# Patient Record
Sex: MALE | Race: ASIAN | NOT HISPANIC OR LATINO
[De-identification: names, ages, dates, MRNs, and addresses within clinical notes are randomized per-mention and may not be internally consistent; named-entity substitution may affect disease eponyms.]

---

## 2017-01-26 ENCOUNTER — APPOINTMENT (OUTPATIENT)
Dept: UROLOGY | Facility: CLINIC | Age: 67
End: 2017-01-26

## 2017-01-27 LAB
APPEARANCE: CLEAR
BACTERIA: NEGATIVE
BILIRUBIN URINE: NEGATIVE
BLOOD URINE: NEGATIVE
COLOR: YELLOW
GLUCOSE QUALITATIVE U: NORMAL MG/DL
KETONES URINE: NEGATIVE
LEUKOCYTE ESTERASE URINE: NEGATIVE
MICROSCOPIC-UA: NORMAL
NITRITE URINE: NEGATIVE
PH URINE: 6
PROTEIN URINE: NEGATIVE MG/DL
RED BLOOD CELLS URINE: 0 /HPF
SPECIFIC GRAVITY URINE: 1.01
SQUAMOUS EPITHELIAL CELLS: 0 /HPF
UROBILINOGEN URINE: NORMAL MG/DL
WHITE BLOOD CELLS URINE: 1 /HPF

## 2017-02-27 ENCOUNTER — APPOINTMENT (OUTPATIENT)
Dept: CV DIAGNOSTICS | Facility: HOSPITAL | Age: 67
End: 2017-02-27

## 2017-02-27 ENCOUNTER — OUTPATIENT (OUTPATIENT)
Dept: OUTPATIENT SERVICES | Facility: HOSPITAL | Age: 67
LOS: 1 days | End: 2017-02-27
Payer: MEDICAID

## 2017-02-27 DIAGNOSIS — R07.89 OTHER CHEST PAIN: ICD-10-CM

## 2017-02-27 PROCEDURE — 93016 CV STRESS TEST SUPVJ ONLY: CPT | Mod: GC

## 2017-02-27 PROCEDURE — 78452 HT MUSCLE IMAGE SPECT MULT: CPT | Mod: 26

## 2017-02-27 PROCEDURE — 93018 CV STRESS TEST I&R ONLY: CPT | Mod: GC

## 2017-05-11 ENCOUNTER — APPOINTMENT (OUTPATIENT)
Dept: UROLOGY | Facility: CLINIC | Age: 67
End: 2017-05-11

## 2017-05-11 RX ORDER — AMOXICILLIN AND CLAVULANATE POTASSIUM 875; 125 MG/1; MG/1
875-125 TABLET, COATED ORAL TWICE DAILY
Qty: 20 | Refills: 0 | Status: COMPLETED | COMMUNITY
Start: 2017-05-11 | End: 2017-05-18

## 2017-05-17 ENCOUNTER — MESSAGE (OUTPATIENT)
Age: 67
End: 2017-05-17

## 2017-08-17 ENCOUNTER — OUTPATIENT (OUTPATIENT)
Dept: OUTPATIENT SERVICES | Facility: HOSPITAL | Age: 67
LOS: 1 days | End: 2017-08-17
Payer: MEDICAID

## 2017-08-17 ENCOUNTER — APPOINTMENT (OUTPATIENT)
Dept: CARDIOLOGY | Facility: HOSPITAL | Age: 67
End: 2017-08-17

## 2017-08-17 DIAGNOSIS — R07.9 CHEST PAIN, UNSPECIFIED: ICD-10-CM

## 2017-08-17 PROCEDURE — 75574 CT ANGIO HRT W/3D IMAGE: CPT

## 2017-08-17 PROCEDURE — 75574 CT ANGIO HRT W/3D IMAGE: CPT | Mod: 26

## 2017-11-06 ENCOUNTER — TRANSCRIPTION ENCOUNTER (OUTPATIENT)
Age: 67
End: 2017-11-06

## 2017-11-06 ENCOUNTER — INPATIENT (INPATIENT)
Facility: HOSPITAL | Age: 67
LOS: 0 days | Discharge: HOME | End: 2017-11-07
Attending: INTERNAL MEDICINE | Admitting: INTERNAL MEDICINE
Payer: MEDICAID

## 2017-11-06 VITALS
SYSTOLIC BLOOD PRESSURE: 156 MMHG | WEIGHT: 128.09 LBS | HEART RATE: 65 BPM | HEIGHT: 67 IN | DIASTOLIC BLOOD PRESSURE: 91 MMHG | TEMPERATURE: 98 F | RESPIRATION RATE: 16 BRPM | OXYGEN SATURATION: 100 %

## 2017-11-06 DIAGNOSIS — R94.39 ABNORMAL RESULT OF OTHER CARDIOVASCULAR FUNCTION STUDY: ICD-10-CM

## 2017-11-06 LAB
BUN SERPL-MCNC: 12 MG/DL — SIGNIFICANT CHANGE UP (ref 7–23)
CALCIUM SERPL-MCNC: 8.6 MG/DL — SIGNIFICANT CHANGE UP (ref 8.4–10.5)
CHLORIDE SERPL-SCNC: 100 MMOL/L — SIGNIFICANT CHANGE UP (ref 98–107)
CO2 SERPL-SCNC: 32 MMOL/L — HIGH (ref 22–31)
CREAT SERPL-MCNC: 1.02 MG/DL — SIGNIFICANT CHANGE UP (ref 0.5–1.3)
GLUCOSE SERPL-MCNC: 98 MG/DL — SIGNIFICANT CHANGE UP (ref 70–99)
HBA1C BLD-MCNC: 5.6 % — SIGNIFICANT CHANGE UP (ref 4–5.6)
HCT VFR BLD CALC: 44.8 % — SIGNIFICANT CHANGE UP (ref 39–50)
HGB BLD-MCNC: 15.1 G/DL — SIGNIFICANT CHANGE UP (ref 13–17)
MCHC RBC-ENTMCNC: 31.8 PG — SIGNIFICANT CHANGE UP (ref 27–34)
MCHC RBC-ENTMCNC: 33.7 % — SIGNIFICANT CHANGE UP (ref 32–36)
MCV RBC AUTO: 94.3 FL — SIGNIFICANT CHANGE UP (ref 80–100)
NRBC # FLD: 0 — SIGNIFICANT CHANGE UP
PLATELET # BLD AUTO: 163 K/UL — SIGNIFICANT CHANGE UP (ref 150–400)
PMV BLD: 10.8 FL — SIGNIFICANT CHANGE UP (ref 7–13)
POTASSIUM SERPL-MCNC: 3.8 MMOL/L — SIGNIFICANT CHANGE UP (ref 3.5–5.3)
POTASSIUM SERPL-SCNC: 3.8 MMOL/L — SIGNIFICANT CHANGE UP (ref 3.5–5.3)
RBC # BLD: 4.75 M/UL — SIGNIFICANT CHANGE UP (ref 4.2–5.8)
RBC # FLD: 12.6 % — SIGNIFICANT CHANGE UP (ref 10.3–14.5)
SODIUM SERPL-SCNC: 141 MMOL/L — SIGNIFICANT CHANGE UP (ref 135–145)
WBC # BLD: 3.85 K/UL — SIGNIFICANT CHANGE UP (ref 3.8–10.5)
WBC # FLD AUTO: 3.85 K/UL — SIGNIFICANT CHANGE UP (ref 3.8–10.5)

## 2017-11-06 PROCEDURE — 93010 ELECTROCARDIOGRAM REPORT: CPT

## 2017-11-06 RX ORDER — METOPROLOL TARTRATE 50 MG
50 TABLET ORAL DAILY
Qty: 0 | Refills: 0 | Status: DISCONTINUED | OUTPATIENT
Start: 2017-11-06 | End: 2017-11-07

## 2017-11-06 RX ORDER — ASPIRIN/CALCIUM CARB/MAGNESIUM 324 MG
81 TABLET ORAL DAILY
Qty: 0 | Refills: 0 | Status: CANCELLED | OUTPATIENT
Start: 2017-11-07 | End: 2017-11-07

## 2017-11-06 RX ORDER — HEPARIN SODIUM 5000 [USP'U]/ML
5000 INJECTION INTRAVENOUS; SUBCUTANEOUS EVERY 12 HOURS
Qty: 0 | Refills: 0 | Status: CANCELLED | OUTPATIENT
Start: 2017-11-07 | End: 2017-11-07

## 2017-11-06 RX ORDER — SODIUM CHLORIDE 9 MG/ML
3 INJECTION INTRAMUSCULAR; INTRAVENOUS; SUBCUTANEOUS EVERY 8 HOURS
Qty: 0 | Refills: 0 | Status: DISCONTINUED | OUTPATIENT
Start: 2017-11-06 | End: 2017-11-07

## 2017-11-06 RX ORDER — ATORVASTATIN CALCIUM 80 MG/1
80 TABLET, FILM COATED ORAL AT BEDTIME
Qty: 0 | Refills: 0 | Status: DISCONTINUED | OUTPATIENT
Start: 2017-11-06 | End: 2017-11-07

## 2017-11-06 RX ORDER — CLOPIDOGREL BISULFATE 75 MG/1
75 TABLET, FILM COATED ORAL DAILY
Qty: 0 | Refills: 0 | Status: CANCELLED | OUTPATIENT
Start: 2017-11-07 | End: 2017-11-07

## 2017-11-06 RX ORDER — SODIUM CHLORIDE 9 MG/ML
500 INJECTION INTRAMUSCULAR; INTRAVENOUS; SUBCUTANEOUS
Qty: 0 | Refills: 0 | Status: DISCONTINUED | OUTPATIENT
Start: 2017-11-06 | End: 2017-11-06

## 2017-11-06 RX ORDER — TAMSULOSIN HYDROCHLORIDE 0.4 MG/1
0.4 CAPSULE ORAL AT BEDTIME
Qty: 0 | Refills: 0 | Status: DISCONTINUED | OUTPATIENT
Start: 2017-11-06 | End: 2017-11-07

## 2017-11-06 RX ORDER — LISINOPRIL 2.5 MG/1
40 TABLET ORAL DAILY
Qty: 0 | Refills: 0 | Status: DISCONTINUED | OUTPATIENT
Start: 2017-11-06 | End: 2017-11-07

## 2017-11-06 RX ORDER — FINASTERIDE 5 MG/1
5 TABLET, FILM COATED ORAL DAILY
Qty: 0 | Refills: 0 | Status: DISCONTINUED | OUTPATIENT
Start: 2017-11-06 | End: 2017-11-07

## 2017-11-06 RX ADMIN — TAMSULOSIN HYDROCHLORIDE 0.4 MILLIGRAM(S): 0.4 CAPSULE ORAL at 21:21

## 2017-11-06 RX ADMIN — Medication 50 MILLIGRAM(S): at 14:30

## 2017-11-06 RX ADMIN — SODIUM CHLORIDE 3 MILLILITER(S): 9 INJECTION INTRAMUSCULAR; INTRAVENOUS; SUBCUTANEOUS at 13:41

## 2017-11-06 RX ADMIN — SODIUM CHLORIDE 3 MILLILITER(S): 9 INJECTION INTRAMUSCULAR; INTRAVENOUS; SUBCUTANEOUS at 21:21

## 2017-11-06 RX ADMIN — LISINOPRIL 40 MILLIGRAM(S): 2.5 TABLET ORAL at 14:30

## 2017-11-06 RX ADMIN — SODIUM CHLORIDE 75 MILLILITER(S): 9 INJECTION INTRAMUSCULAR; INTRAVENOUS; SUBCUTANEOUS at 13:41

## 2017-11-06 RX ADMIN — ATORVASTATIN CALCIUM 80 MILLIGRAM(S): 80 TABLET, FILM COATED ORAL at 21:21

## 2017-11-06 NOTE — H&P CARDIOLOGY - HISTORY OF PRESENT ILLNESS
67 y.o. male presents today for elective cardiac catheterization. The patient The patient c/o increased fatigue within 2 years. Admits to chest pain and SOB wit exertion (running and lifting weights). Admits to episodes of dizziness, palpitations,  b/l lower extremities edema. Denies fever, chill, melena, hematochezia, abdominal pain. The patient was evaluated by his cardiologist last years, was found to have an abnormal stress test, recommended to have cardiac cath but declined at that time. Due to continuation of symptoms, the patient was reevaluated by his cardiologist again recommended cardiac cath patient agreed this time. 67 y.o. male presents today for elective cardiac catheterization. The patient The patient c/o increased fatigue within 2 years. Admits to chest pain and SOB with exertion (running and lifting weights). Admits to episodes of dizziness, palpitations,  b/l lower extremities edema. Denies fever, chill, presyncope, syncope, melena, hematochezia, abdominal pain. The patient was evaluated by his cardiologist in 2/2017s, was found to have an abnormal stress test, echo adn Ct coronary angiogram, recommended to have cardiac cath but the patient declined cath  at that time. Due to continuation of symptoms, the patient was reevaluated by his cardiologist again recommended cardiac cath, patient agreed this time. The patient admits to noncompliance to Rx.     Echo 2/7/17 - minimal AI, mild to moderate MR, EF 60%  Stress Test 2/2/7/17 - EF 55%,medium sized, moderate defects in apical, inferoseptal, septal walls that are reversible, suggestive of ischemia.   CT coronary angiogram  8/17/17 - LM 50%, LAD 60-70%, LCx patent, RCA 30%.

## 2017-11-06 NOTE — H&P CARDIOLOGY - PMH
BPH (benign prostatic hyperplasia)    Cholelithiasis  pending surgery  Essential hypertension    Hypercholesterolemia

## 2017-11-06 NOTE — H&P CARDIOLOGY - REVIEW OF SYSTEMS
NO  syncope,  fever chills, malaise, myalgias, anorexia, weight changes ( loss or gain), night sweats, generalized fatigue abdominal pain, N/V/C/D BRBPR, melena,  cough, and wheezing.

## 2017-11-07 VITALS
SYSTOLIC BLOOD PRESSURE: 126 MMHG | TEMPERATURE: 98 F | RESPIRATION RATE: 16 BRPM | OXYGEN SATURATION: 100 % | DIASTOLIC BLOOD PRESSURE: 76 MMHG | HEART RATE: 68 BPM | WEIGHT: 127.87 LBS

## 2017-11-07 LAB
BUN SERPL-MCNC: 13 MG/DL — SIGNIFICANT CHANGE UP (ref 7–23)
CALCIUM SERPL-MCNC: 8.6 MG/DL — SIGNIFICANT CHANGE UP (ref 8.4–10.5)
CHLORIDE SERPL-SCNC: 101 MMOL/L — SIGNIFICANT CHANGE UP (ref 98–107)
CO2 SERPL-SCNC: 26 MMOL/L — SIGNIFICANT CHANGE UP (ref 22–31)
CREAT SERPL-MCNC: 0.96 MG/DL — SIGNIFICANT CHANGE UP (ref 0.5–1.3)
GLUCOSE SERPL-MCNC: 101 MG/DL — HIGH (ref 70–99)
HCT VFR BLD CALC: 47.3 % — SIGNIFICANT CHANGE UP (ref 39–50)
HGB BLD-MCNC: 15.7 G/DL — SIGNIFICANT CHANGE UP (ref 13–17)
MAGNESIUM SERPL-MCNC: 2.3 MG/DL — SIGNIFICANT CHANGE UP (ref 1.6–2.6)
MCHC RBC-ENTMCNC: 31.8 PG — SIGNIFICANT CHANGE UP (ref 27–34)
MCHC RBC-ENTMCNC: 33.2 % — SIGNIFICANT CHANGE UP (ref 32–36)
MCV RBC AUTO: 95.9 FL — SIGNIFICANT CHANGE UP (ref 80–100)
NRBC # FLD: 0 — SIGNIFICANT CHANGE UP
PHOSPHATE SERPL-MCNC: 3.5 MG/DL — SIGNIFICANT CHANGE UP (ref 2.5–4.5)
PLATELET # BLD AUTO: 160 K/UL — SIGNIFICANT CHANGE UP (ref 150–400)
PMV BLD: 10.9 FL — SIGNIFICANT CHANGE UP (ref 7–13)
POTASSIUM SERPL-MCNC: 3.7 MMOL/L — SIGNIFICANT CHANGE UP (ref 3.5–5.3)
POTASSIUM SERPL-SCNC: 3.7 MMOL/L — SIGNIFICANT CHANGE UP (ref 3.5–5.3)
RBC # BLD: 4.93 M/UL — SIGNIFICANT CHANGE UP (ref 4.2–5.8)
RBC # FLD: 12.7 % — SIGNIFICANT CHANGE UP (ref 10.3–14.5)
SODIUM SERPL-SCNC: 139 MMOL/L — SIGNIFICANT CHANGE UP (ref 135–145)
WBC # BLD: 6.29 K/UL — SIGNIFICANT CHANGE UP (ref 3.8–10.5)
WBC # FLD AUTO: 6.29 K/UL — SIGNIFICANT CHANGE UP (ref 3.8–10.5)

## 2017-11-07 RX ORDER — METOPROLOL TARTRATE 50 MG
1 TABLET ORAL
Qty: 0 | Refills: 0 | COMMUNITY
Start: 2017-11-07

## 2017-11-07 RX ORDER — CLOPIDOGREL BISULFATE 75 MG/1
1 TABLET, FILM COATED ORAL
Qty: 30 | Refills: 0 | OUTPATIENT
Start: 2017-11-07 | End: 2017-12-07

## 2017-11-07 RX ORDER — ASPIRIN/CALCIUM CARB/MAGNESIUM 324 MG
1 TABLET ORAL
Qty: 100 | Refills: 0 | OUTPATIENT
Start: 2017-11-07 | End: 2018-02-15

## 2017-11-07 RX ORDER — CLOPIDOGREL BISULFATE 75 MG/1
1 TABLET, FILM COATED ORAL
Qty: 0 | Refills: 0 | COMMUNITY
Start: 2017-11-07

## 2017-11-07 RX ORDER — ASPIRIN/CALCIUM CARB/MAGNESIUM 324 MG
1 TABLET ORAL
Qty: 0 | Refills: 0 | COMMUNITY
Start: 2017-11-07

## 2017-11-07 RX ORDER — ATORVASTATIN CALCIUM 80 MG/1
1 TABLET, FILM COATED ORAL
Qty: 30 | Refills: 0 | OUTPATIENT
Start: 2017-11-07 | End: 2017-12-07

## 2017-11-07 RX ORDER — METOPROLOL TARTRATE 50 MG
1 TABLET ORAL
Qty: 30 | Refills: 0 | OUTPATIENT
Start: 2017-11-07 | End: 2017-12-07

## 2017-11-07 RX ORDER — ATORVASTATIN CALCIUM 80 MG/1
1 TABLET, FILM COATED ORAL
Qty: 0 | Refills: 0 | COMMUNITY
Start: 2017-11-07

## 2017-11-07 RX ADMIN — CLOPIDOGREL BISULFATE 75 MILLIGRAM(S): 75 TABLET, FILM COATED ORAL at 11:17

## 2017-11-07 RX ADMIN — FINASTERIDE 5 MILLIGRAM(S): 5 TABLET, FILM COATED ORAL at 11:17

## 2017-11-07 RX ADMIN — Medication 81 MILLIGRAM(S): at 11:17

## 2017-11-07 RX ADMIN — SODIUM CHLORIDE 3 MILLILITER(S): 9 INJECTION INTRAMUSCULAR; INTRAVENOUS; SUBCUTANEOUS at 05:51

## 2017-11-07 RX ADMIN — LISINOPRIL 40 MILLIGRAM(S): 2.5 TABLET ORAL at 05:51

## 2017-11-07 RX ADMIN — Medication 50 MILLIGRAM(S): at 05:51

## 2017-11-07 NOTE — DISCHARGE NOTE ADULT - PATIENT PORTAL LINK FT
“You can access the [facility] Patient Portal, offered by [facility], by registering with the following website: [email or web address]”

## 2017-11-07 NOTE — PROGRESS NOTE ADULT - ASSESSMENT
67 year old male for elective cardiac catheterization.     1. cv stable   no chest pain or sob  s/p cath mLAD 90% x1 BMS, LCx luminal, RCA normal, EF 60%,   continue with ASA/ plavix/ statin/ BB     2. htn   bp acceptable  continue with current antihtn meds     dc planning today   f.u appointment for 11/21/17 at 0915 am

## 2017-11-07 NOTE — DISCHARGE NOTE ADULT - PLAN OF CARE
To prevent chest pain, heart attack and worsening coronary artery disease Continue aspirin and Plavix, do not stop unless instructed by physician.  Follow up with cardiologist  Continue low cholesterol diet To maintain a normal blood pressure to prevent heart attack, stroke and renal failure Low sodium diet, Follow up with primary care physician.  Continue anti-hypertensive medications Maintain normal cholesterol levels to prevent stroke and heart attack Low fat diet, Continue current medications. Follow up with primary care physician and cardiologist To be asymptomatic Continue current medications as prescribed. Low fat diet.  Would defer any elective cholecystectomy for at least 3 months.

## 2017-11-07 NOTE — DISCHARGE NOTE ADULT - CARE PROVIDER_API CALL
Sami Colon (MD), Cardiovascular Disease; Internal Medicine; Interventional Cardiology; Nuclear Cardiology  [address] [facility] [address]  [place] [address]  Phone: [phone]  Fax: [phone]

## 2017-11-07 NOTE — PROGRESS NOTE ADULT - SUBJECTIVE AND OBJECTIVE BOX
CARDIOLOGY FOLLOW UP - Dr. Darlene MORRIS      PHYSICAL EXAM:  T(C): 36.6 (11-07-17 @ 05:55), Max: 36.7 (11-06-17 @ 17:21)  HR: 68 (11-07-17 @ 05:55) (60 - 68)  BP: 126/76 (11-07-17 @ 05:55) (126/76 - 156/91)  RR: 16 (11-07-17 @ 05:55) (16 - 16)  SpO2: 100% (11-07-17 @ 05:55) (100% - 100%)  Wt(kg): --  I&O's Summary    06 Nov 2017 07:01  -  07 Nov 2017 07:00  --------------------------------------------------------  IN: 400 mL / OUT: 0 mL / NET: 400 mL        Appearance: Normal	  Cardiovascular: Normal S1 S2,RRR, No JVD, No murmurs  Respiratory: Lungs clear to auscultation	  Gastrointestinal:  Soft, Non-tender, + BS	  Extremities: Normal range of motion, No clubbing, cyanosis or edema        MEDICATIONS  (STANDING):  atorvastatin 80 milliGRAM(s) Oral at bedtime  finasteride 5 milliGRAM(s) Oral daily  lisinopril 40 milliGRAM(s) Oral daily  metoprolol succinate ER 50 milliGRAM(s) Oral daily  sodium chloride 0.9% lock flush 3 milliLiter(s) IV Push every 8 hours  tamsulosin 0.4 milliGRAM(s) Oral at bedtime      TELEMETRY: NSR 	    ECG:  	  RADIOLOGY:   DIAGNOSTIC TESTING:  [ ] Echocardiogram:  [ x ]  Catheterization:  < from: Cardiac Cath Lab - Adult (11.06.17 @ 10:46) >  INTERVENTIONAL IMPRESSIONS: Unstable angina. Patient with persistent and  progressive exertional angina. Recent exercise stress testing and CTA  coronaries revealed severe ischemia in LAD distribution and obstructive  LAD disease. Cath with severe focal mid LAD lesion with normal LCX and  large RCA vessels. Mild segmental LV dysfunction with hypokinesis of the  apex and distal inferior wall with overall preserved EF.  INTERVENTIONAL RECOMMENDATIONS: PCI to severe mid LAD lesion in setting of  unstable angina, severe anterior, apical wall ischemia. Bare metal stent  used in setting ofpatient's known history of medication noncompliance.  Continue ASA 81mg and plavix daily. Would defer any elective  cholecystectomy for at least 3 months.  Prepared and signed by  Sami Colon M.D.  Signed 11/06/2017 12:33:22    < end of copied text >    [ ] Stress Test:    OTHER: 	    LABS:	 	                                15.7   6.29  )-----------( 160      ( 07 Nov 2017 06:40 )             47.3     11-07    139  |  101  |  13  ----------------------------<  101<H>  3.7   |  26  |  0.96    Ca    8.6      07 Nov 2017 06:40  Phos  3.5     11-07  Mg     2.3     11-07

## 2017-11-07 NOTE — DISCHARGE NOTE ADULT - HOSPITAL COURSE
67 y.o. male presents today for elective cardiac catheterization.  Cath report:  PCI to severe mid LAD lesion in setting of unstable angina, severe anterior, apical wall ischemia. Bare metal stent used in setting of patient's known history of medication noncompliance.  Continue ASA 81mg and plavix daily. Would defer any elective cholecystectomy for at least 3 months.

## 2017-11-07 NOTE — DISCHARGE NOTE ADULT - ADDITIONAL INSTRUCTIONS
Follow up with your PMD and cardiologist within one week.  No heavy lifting greater than 5-10 pounds with right wrist x one week. No strenuous activity x 3 weeks. Monitor site of procedure and notify your doctor for any redness, swelling, discharge

## 2017-11-07 NOTE — DISCHARGE NOTE ADULT - MEDICATION SUMMARY - MEDICATIONS TO TAKE
I will START or STAY ON the medications listed below when I get home from the hospital:    finasteride 5 mg oral tablet  -- 1 tab(s) by mouth once a day  -- Indication: For BPH (benign prostatic hyperplasia)    aspirin 81 mg oral delayed release tablet  -- 1 tab(s) by mouth once a day  -- Indication: For CAD     ramipril 10 mg oral capsule  -- 1 cap(s) by mouth 2 times a day  -- Indication: For Essential hypertension    Flomax 0.4 mg oral capsule  -- 1 cap(s) by mouth once a day  -- Indication: For BPH (benign prostatic hyperplasia)    atorvastatin 80 mg oral tablet  -- 1 tab(s) by mouth once a day (at bedtime)  -- Indication: For Hypercholesterolemia    clopidogrel 75 mg oral tablet  -- 1 tab(s) by mouth once a day  -- Indication: For CAD    metoprolol succinate 50 mg oral tablet, extended release  -- 1 tab(s) by mouth once a day  -- Indication: For Essential hypertension

## 2017-11-07 NOTE — DISCHARGE NOTE ADULT - NS AS ACTIVITY OBS
No Heavy lifting/straining/No heavy lifting greater than 5-10 pounds with right wrist x one week. No strenuous activity x 3 weeks. Monitor site of procedure and notify your doctor for any redness, swelling, discharge

## 2017-11-07 NOTE — DISCHARGE NOTE ADULT - CARE PLAN
Principal Discharge DX:	CAD S/P percutaneous coronary angioplasty  Goal:	To prevent chest pain, heart attack and worsening coronary artery disease  Instructions for follow-up, activity and diet:	Continue aspirin and Plavix, do not stop unless instructed by physician.  Follow up with cardiologist  Continue low cholesterol diet  Secondary Diagnosis:	Essential hypertension  Goal:	To maintain a normal blood pressure to prevent heart attack, stroke and renal failure  Instructions for follow-up, activity and diet:	Low sodium diet, Follow up with primary care physician.  Continue anti-hypertensive medications  Secondary Diagnosis:	Hypercholesterolemia  Goal:	Maintain normal cholesterol levels to prevent stroke and heart attack  Instructions for follow-up, activity and diet:	Low fat diet, Continue current medications. Follow up with primary care physician and cardiologist  Secondary Diagnosis:	BPH (benign prostatic hyperplasia)  Goal:	To be asymptomatic  Instructions for follow-up, activity and diet:	Continue current medications as prescribed.  Secondary Diagnosis:	Cholelithiasis  Goal:	To be asymptomatic  Instructions for follow-up, activity and diet:	Low fat diet.  Would defer any elective cholecystectomy for at least 3 months.

## 2017-11-14 ENCOUNTER — MEDICATION RENEWAL (OUTPATIENT)
Age: 67
End: 2017-11-14

## 2017-12-12 ENCOUNTER — APPOINTMENT (OUTPATIENT)
Dept: UROLOGY | Facility: CLINIC | Age: 67
End: 2017-12-12
Payer: MEDICAID

## 2017-12-12 PROCEDURE — 99213 OFFICE O/P EST LOW 20 MIN: CPT

## 2017-12-12 RX ORDER — ASPIRIN 81 MG
81 TABLET, DELAYED RELEASE (ENTERIC COATED) ORAL
Refills: 0 | Status: ACTIVE | COMMUNITY

## 2017-12-12 RX ORDER — AMOXICILLIN AND CLAVULANATE POTASSIUM 875; 125 MG/1; MG/1
875-125 TABLET, COATED ORAL TWICE DAILY
Qty: 14 | Refills: 0 | Status: COMPLETED | COMMUNITY
Start: 2017-12-12 | End: 2017-12-19

## 2017-12-28 ENCOUNTER — MEDICATION RENEWAL (OUTPATIENT)
Age: 67
End: 2017-12-28

## 2018-06-22 PROBLEM — N40.0 BENIGN PROSTATIC HYPERPLASIA WITHOUT LOWER URINARY TRACT SYMPTOMS: Chronic | Status: ACTIVE | Noted: 2017-11-06

## 2018-07-12 ENCOUNTER — APPOINTMENT (OUTPATIENT)
Dept: UROLOGY | Facility: CLINIC | Age: 68
End: 2018-07-12
Payer: MEDICARE

## 2018-07-12 VITALS
DIASTOLIC BLOOD PRESSURE: 74 MMHG | HEART RATE: 75 BPM | SYSTOLIC BLOOD PRESSURE: 178 MMHG | RESPIRATION RATE: 16 BRPM | TEMPERATURE: 98.7 F

## 2018-07-12 PROCEDURE — 99213 OFFICE O/P EST LOW 20 MIN: CPT

## 2018-12-01 ENCOUNTER — OUTPATIENT (OUTPATIENT)
Dept: OUTPATIENT SERVICES | Facility: HOSPITAL | Age: 68
LOS: 1 days | End: 2018-12-01
Payer: MEDICARE

## 2018-12-01 PROCEDURE — G9001: CPT

## 2018-12-17 ENCOUNTER — INPATIENT (INPATIENT)
Facility: HOSPITAL | Age: 68
LOS: 2 days | Discharge: HOME | End: 2018-12-20
Attending: INTERNAL MEDICINE | Admitting: INTERNAL MEDICINE
Payer: MEDICARE

## 2018-12-17 VITALS
RESPIRATION RATE: 18 BRPM | HEART RATE: 90 BPM | TEMPERATURE: 98 F | DIASTOLIC BLOOD PRESSURE: 69 MMHG | OXYGEN SATURATION: 95 % | SYSTOLIC BLOOD PRESSURE: 170 MMHG

## 2018-12-17 DIAGNOSIS — K80.50 CALCULUS OF BILE DUCT WITHOUT CHOLANGITIS OR CHOLECYSTITIS WITHOUT OBSTRUCTION: ICD-10-CM

## 2018-12-17 DIAGNOSIS — R55 SYNCOPE AND COLLAPSE: ICD-10-CM

## 2018-12-17 DIAGNOSIS — R51 HEADACHE: ICD-10-CM

## 2018-12-17 DIAGNOSIS — I10 ESSENTIAL (PRIMARY) HYPERTENSION: ICD-10-CM

## 2018-12-17 DIAGNOSIS — Z29.9 ENCOUNTER FOR PROPHYLACTIC MEASURES, UNSPECIFIED: ICD-10-CM

## 2018-12-17 DIAGNOSIS — N40.0 BENIGN PROSTATIC HYPERPLASIA WITHOUT LOWER URINARY TRACT SYMPTOMS: ICD-10-CM

## 2018-12-17 DIAGNOSIS — I20.0 UNSTABLE ANGINA: ICD-10-CM

## 2018-12-17 DIAGNOSIS — E78.00 PURE HYPERCHOLESTEROLEMIA, UNSPECIFIED: ICD-10-CM

## 2018-12-17 LAB
ALBUMIN SERPL ELPH-MCNC: 4.4 G/DL — SIGNIFICANT CHANGE UP (ref 3.3–5)
ALP SERPL-CCNC: 57 U/L — SIGNIFICANT CHANGE UP (ref 40–120)
ALT FLD-CCNC: 22 U/L — SIGNIFICANT CHANGE UP (ref 4–41)
APTT BLD: 32.2 SEC — SIGNIFICANT CHANGE UP (ref 27.5–36.3)
AST SERPL-CCNC: 26 U/L — SIGNIFICANT CHANGE UP (ref 4–40)
BASOPHILS # BLD AUTO: 0.05 K/UL — SIGNIFICANT CHANGE UP (ref 0–0.2)
BASOPHILS NFR BLD AUTO: 0.9 % — SIGNIFICANT CHANGE UP (ref 0–2)
BILIRUB SERPL-MCNC: 1 MG/DL — SIGNIFICANT CHANGE UP (ref 0.2–1.2)
BUN SERPL-MCNC: 16 MG/DL — SIGNIFICANT CHANGE UP (ref 7–23)
CALCIUM SERPL-MCNC: 9.1 MG/DL — SIGNIFICANT CHANGE UP (ref 8.4–10.5)
CHLORIDE SERPL-SCNC: 99 MMOL/L — SIGNIFICANT CHANGE UP (ref 98–107)
CK MB BLD-MCNC: 2.37 NG/ML — SIGNIFICANT CHANGE UP (ref 1–6.6)
CK MB BLD-MCNC: SIGNIFICANT CHANGE UP (ref 0–2.5)
CK SERPL-CCNC: 99 U/L — SIGNIFICANT CHANGE UP (ref 30–200)
CO2 SERPL-SCNC: 26 MMOL/L — SIGNIFICANT CHANGE UP (ref 22–31)
CREAT SERPL-MCNC: 0.96 MG/DL — SIGNIFICANT CHANGE UP (ref 0.5–1.3)
EOSINOPHIL # BLD AUTO: 0.11 K/UL — SIGNIFICANT CHANGE UP (ref 0–0.5)
EOSINOPHIL NFR BLD AUTO: 2 % — SIGNIFICANT CHANGE UP (ref 0–6)
GLUCOSE SERPL-MCNC: 108 MG/DL — HIGH (ref 70–99)
HCT VFR BLD CALC: 48.8 % — SIGNIFICANT CHANGE UP (ref 39–50)
HGB BLD-MCNC: 16.3 G/DL — SIGNIFICANT CHANGE UP (ref 13–17)
IMM GRANULOCYTES # BLD AUTO: 0.01 # — SIGNIFICANT CHANGE UP
IMM GRANULOCYTES NFR BLD AUTO: 0.2 % — SIGNIFICANT CHANGE UP (ref 0–1.5)
INR BLD: 1 — SIGNIFICANT CHANGE UP (ref 0.88–1.17)
LYMPHOCYTES # BLD AUTO: 0.99 K/UL — LOW (ref 1–3.3)
LYMPHOCYTES # BLD AUTO: 18.4 % — SIGNIFICANT CHANGE UP (ref 13–44)
MCHC RBC-ENTMCNC: 31.4 PG — SIGNIFICANT CHANGE UP (ref 27–34)
MCHC RBC-ENTMCNC: 33.4 % — SIGNIFICANT CHANGE UP (ref 32–36)
MCV RBC AUTO: 94 FL — SIGNIFICANT CHANGE UP (ref 80–100)
MONOCYTES # BLD AUTO: 0.42 K/UL — SIGNIFICANT CHANGE UP (ref 0–0.9)
MONOCYTES NFR BLD AUTO: 7.8 % — SIGNIFICANT CHANGE UP (ref 2–14)
NEUTROPHILS # BLD AUTO: 3.81 K/UL — SIGNIFICANT CHANGE UP (ref 1.8–7.4)
NEUTROPHILS NFR BLD AUTO: 70.7 % — SIGNIFICANT CHANGE UP (ref 43–77)
NRBC # FLD: 0 — SIGNIFICANT CHANGE UP
PLATELET # BLD AUTO: 180 K/UL — SIGNIFICANT CHANGE UP (ref 150–400)
PMV BLD: 10.3 FL — SIGNIFICANT CHANGE UP (ref 7–13)
POTASSIUM SERPL-MCNC: 3.6 MMOL/L — SIGNIFICANT CHANGE UP (ref 3.5–5.3)
POTASSIUM SERPL-SCNC: 3.6 MMOL/L — SIGNIFICANT CHANGE UP (ref 3.5–5.3)
PROT SERPL-MCNC: 7.4 G/DL — SIGNIFICANT CHANGE UP (ref 6–8.3)
PROTHROM AB SERPL-ACNC: 11.1 SEC — SIGNIFICANT CHANGE UP (ref 9.8–13.1)
RBC # BLD: 5.19 M/UL — SIGNIFICANT CHANGE UP (ref 4.2–5.8)
RBC # FLD: 12.7 % — SIGNIFICANT CHANGE UP (ref 10.3–14.5)
SODIUM SERPL-SCNC: 139 MMOL/L — SIGNIFICANT CHANGE UP (ref 135–145)
TROPONIN T, HIGH SENSITIVITY: 11 NG/L — SIGNIFICANT CHANGE UP (ref ?–14)
TROPONIN T, HIGH SENSITIVITY: 11 NG/L — SIGNIFICANT CHANGE UP (ref ?–14)
WBC # BLD: 5.39 K/UL — SIGNIFICANT CHANGE UP (ref 3.8–10.5)
WBC # FLD AUTO: 5.39 K/UL — SIGNIFICANT CHANGE UP (ref 3.8–10.5)

## 2018-12-17 PROCEDURE — 71046 X-RAY EXAM CHEST 2 VIEWS: CPT | Mod: 26

## 2018-12-17 PROCEDURE — 70450 CT HEAD/BRAIN W/O DYE: CPT | Mod: 26

## 2018-12-17 RX ORDER — ASPIRIN/CALCIUM CARB/MAGNESIUM 324 MG
81 TABLET ORAL DAILY
Qty: 0 | Refills: 0 | Status: DISCONTINUED | OUTPATIENT
Start: 2018-12-17 | End: 2018-12-20

## 2018-12-17 RX ORDER — METOPROLOL TARTRATE 50 MG
50 TABLET ORAL DAILY
Qty: 0 | Refills: 0 | Status: DISCONTINUED | OUTPATIENT
Start: 2018-12-17 | End: 2018-12-20

## 2018-12-17 RX ORDER — ATORVASTATIN CALCIUM 80 MG/1
80 TABLET, FILM COATED ORAL AT BEDTIME
Qty: 0 | Refills: 0 | Status: DISCONTINUED | OUTPATIENT
Start: 2018-12-17 | End: 2018-12-20

## 2018-12-17 RX ORDER — SODIUM CHLORIDE 9 MG/ML
1000 INJECTION INTRAMUSCULAR; INTRAVENOUS; SUBCUTANEOUS ONCE
Qty: 0 | Refills: 0 | Status: COMPLETED | OUTPATIENT
Start: 2018-12-17 | End: 2018-12-17

## 2018-12-17 RX ORDER — AMLODIPINE BESYLATE 2.5 MG/1
10 TABLET ORAL DAILY
Qty: 0 | Refills: 0 | Status: DISCONTINUED | OUTPATIENT
Start: 2018-12-17 | End: 2018-12-18

## 2018-12-17 RX ORDER — TAMSULOSIN HYDROCHLORIDE 0.4 MG/1
0.4 CAPSULE ORAL AT BEDTIME
Qty: 0 | Refills: 0 | Status: DISCONTINUED | OUTPATIENT
Start: 2018-12-17 | End: 2018-12-20

## 2018-12-17 RX ORDER — LISINOPRIL 2.5 MG/1
40 TABLET ORAL DAILY
Qty: 0 | Refills: 0 | Status: DISCONTINUED | OUTPATIENT
Start: 2018-12-17 | End: 2018-12-20

## 2018-12-17 RX ORDER — FINASTERIDE 5 MG/1
1 TABLET, FILM COATED ORAL
Qty: 0 | Refills: 0 | COMMUNITY

## 2018-12-17 RX ORDER — ACETAMINOPHEN 500 MG
975 TABLET ORAL ONCE
Qty: 0 | Refills: 0 | Status: COMPLETED | OUTPATIENT
Start: 2018-12-17 | End: 2018-12-17

## 2018-12-17 RX ORDER — INFLUENZA VIRUS VACCINE 15; 15; 15; 15 UG/.5ML; UG/.5ML; UG/.5ML; UG/.5ML
0.5 SUSPENSION INTRAMUSCULAR ONCE
Qty: 0 | Refills: 0 | Status: DISCONTINUED | OUTPATIENT
Start: 2018-12-17 | End: 2018-12-20

## 2018-12-17 RX ORDER — CLOPIDOGREL BISULFATE 75 MG/1
75 TABLET, FILM COATED ORAL DAILY
Qty: 0 | Refills: 0 | Status: DISCONTINUED | OUTPATIENT
Start: 2018-12-17 | End: 2018-12-20

## 2018-12-17 RX ADMIN — ATORVASTATIN CALCIUM 80 MILLIGRAM(S): 80 TABLET, FILM COATED ORAL at 22:35

## 2018-12-17 RX ADMIN — Medication 975 MILLIGRAM(S): at 10:59

## 2018-12-17 RX ADMIN — SODIUM CHLORIDE 1000 MILLILITER(S): 9 INJECTION INTRAMUSCULAR; INTRAVENOUS; SUBCUTANEOUS at 10:59

## 2018-12-17 RX ADMIN — CLOPIDOGREL BISULFATE 75 MILLIGRAM(S): 75 TABLET, FILM COATED ORAL at 22:35

## 2018-12-17 RX ADMIN — TAMSULOSIN HYDROCHLORIDE 0.4 MILLIGRAM(S): 0.4 CAPSULE ORAL at 22:35

## 2018-12-17 RX ADMIN — Medication 81 MILLIGRAM(S): at 22:35

## 2018-12-17 NOTE — ED PROVIDER NOTE - OBJECTIVE STATEMENT
69 y/o m presents with near syncope, weakness, ha, cp. Patient states this morning, was eating breakfast, began to feel lightheaded, got up to go to his girlfriend, lightheadedness worsened, called out to gf who came to patient, patient sat down on ground as he felt he was about to faint, remained aware/alert the whole time, and after few minutes, symptoms resolved. Never occurred like this before. Denies any other symptoms prior to episode. Notes he had episode of chest discomfort 2 days ago, center of his chest, nonradiating, felt like a sharp needle poking at his chest, lasted a few minutes, occurred randomly, then resolved on its own. Patients gf notes patient c/o some mild chest discomfort when ems arrived, but patient denies he had any today. Also notes he has been having intermittent posterior headaches for the last year which have become more persistent over the last month, notes he feels it now, mild, b/l posterior, same as prior ha, has never had w/u for these before. Denies any cp at this time. Feels otherwise back to his baseline. NO fevers, chills, cough, sob, abd pain, vomiting, diarrhea, dysuria, le edema, focal weakness, numbness, vision changes, imbalance, recent travel, sick contacts. Cardiologist dr. goldstein.

## 2018-12-17 NOTE — H&P ADULT - NEGATIVE NEUROLOGICAL SYMPTOMS
no paresthesias/no difficulty walking/no confusion/no loss of consciousness/no generalized seizures/no tremors

## 2018-12-17 NOTE — H&P ADULT - PROBLEM SELECTOR PLAN 1
Admitted for telemetry for near syncopal episode.   Obtain second troponin (1st 11).   Elevated glucose, check HgA1C Admitted for telemetry for near syncopal episode.   Obtain second troponin (1st 11).  Monitor I&O's. Monitor for signs of syncope.  Elevated glucose, check HgA1C. Obtain orthostatics and possible echo. Admitted for telemetry for near syncopal episode.   Obtain second troponin (1st 11).  Monitor I&O's. Monitor for signs of syncope.  Elevated glucose, check HgA1C. Obtain orthostatics and possible echo.  Order BNP

## 2018-12-17 NOTE — H&P ADULT - PROBLEM SELECTOR PLAN 3
CT Head without contrast negative. CT Head without contrast showed no acute pathology.  Possible Neuro consult for worsening of symptoms.  Order Acetaminophen PRN for pain.

## 2018-12-17 NOTE — H&P ADULT - NSHPLABSRESULTS_GEN_ALL_CORE
16.3   5.39  )-----------( 180      ( 17 Dec 2018 10:50 )             48.8   12-17    139  |  99  |  16  ----------------------------<  108<H>  3.6   |  26  |  0.96    Ca    9.1      17 Dec 2018 10:50    TPro  7.4  /  Alb  4.4  /  TBili  1.0  /  DBili  x   /  AST  26  /  ALT  22  /  AlkPhos  57  12-17    Trop (11)    EKG: Vent rate 89, , QRS 94, QTc 433  NSR, incomplete RBBB

## 2018-12-17 NOTE — H&P ADULT - ASSESSMENT
69 y/o M with PMH of BPH, essential HTN, cholelithiasis, hypercholesterolemia, unstable angina S/P stent placement, and noncompliance with meds presents after an episode of near syncope with weakness, HA, and CP admit to telemetry for near syncopal episode.

## 2018-12-17 NOTE — ED ADULT TRIAGE NOTE - CHIEF COMPLAINT QUOTE
pt BIBA c/o head pressure and intermittent chest pain since today.  pt recently had his BP meds adjusted but is non-compliant with meds

## 2018-12-17 NOTE — ED PROVIDER NOTE - PROGRESS NOTE DETAILS
Seen by NP from Dr. Colon's office, planned for admission to their service pending results. Initial trop 11, rpt pending, admit to Dr. Colon, tele pa textpage sent.

## 2018-12-17 NOTE — H&P ADULT - PROBLEM SELECTOR PLAN 5
Obtain fasting lipid panel. Not compliant with Atorvastatin. Obtain fasting lipid panel. Not compliant with Atorvastatin. Restart Atorvastatin after lipid panel results.

## 2018-12-17 NOTE — H&P ADULT - NSHPSOCIALHISTORY_GEN_ALL_CORE
Pt is a former smoker for 42 years (1 PPD), stopped 5 years ago. Drinks alcohol occasionally. No recreational drug use. Pt lives with his girlfriend.

## 2018-12-17 NOTE — H&P ADULT - PROBLEM SELECTOR PLAN 6
Follow up with outpatient urologist Dr. Pierre. Continue Flomax.  Per pt Dr. Pierre recommended surgery, has not been scheduled yet.

## 2018-12-17 NOTE — H&P ADULT - NEGATIVE ENMT SYMPTOMS
no hearing difficulty/no sinus symptoms/no dysphagia/no nasal discharge/no gum bleeding/no throat pain/no nasal congestion

## 2018-12-17 NOTE — H&P ADULT - NSHPOUTPATIENTPROVIDERS_GEN_ALL_CORE
PCP: Dr. Yonatan Sebastian  Cardiologist: Dr. Sami Kenyon, last visit one week ago  Urologist: Dr. Shobha Pierre

## 2018-12-17 NOTE — H&P ADULT - PROBLEM SELECTOR PLAN 4
Possible cardiology consult for uncontrolled HTN. Possible cardiology consult for uncontrolled HTN. Monitor BP  Continue Metoprolol, Amplodipine and Ramipril

## 2018-12-17 NOTE — H&P ADULT - HISTORY OF PRESENT ILLNESS
67 y/o M with PMH of BPH, essential HTN, cholelithiasis, hypercholesterolemia, and unstable angina S/P stent placement presents after an episode of near syncope with weakness, HA, and CP. Pt describes that this morning he stood up suddenly in his home to walk to the bedroom when he suddenly felt dizzy and weakness. Pt stated that he then called out for his girlfriend and sat down on the floor. Pt did not lose consciousness. Pt denies any symptoms prior to the episode and he has never experienced symptoms like this before. Pt notes that 4 years ago he had an episode of syncope at home that was not accompanied by similar symptoms but was never worked up after the episode by a healthcare provider. Pt also notes that he has been experiencing CP and chest heaviness for the past month which he describes as "a weight on my chest" and "a needle stabbing my chest", these episodes last a few minutes and then resolve on their own, multiple episodes per day. During interview pt is currently experiencing chest heaviness. Also notes that he has been experiencing dizziness upon standing for the past few weeks. Pt has also been experiencing intermittent HAs over the past year, which he describes as "pressure and stiffness" on the posterior head and neck, he is experiencing an episode currently. He has never been worked up for them before. Pt is also currently experiencing R lower back pain which he describes as "throbbing". Pt noted he stopped taking his Clopidogrel and ASA 1 month ago after experiencing episodes of epistaxis. Pt is also noncompliant with his Metoprolol, Atorvastatin and Ramipril. He states that he takes them occasionally because he thinks that he is on too many medications. Pt denies fevers, chills, SOB, cough, abdo pain, weight loss, fatigue, N/V/D, sick contacts or recent travel.

## 2018-12-17 NOTE — ED PROVIDER NOTE - MEDICAL DECISION MAKING DETAILS
Patient with near syncopal episode earlier today preceded by lightheadedness, symptoms now resolved, also with episode of cp 2 days ago, and 1 year of posterior ha which have become more frequent over last month, has same mild at this time, no neuro deficits, nontoxic appearing, vss. WIll check labs, ekg, cxr,  ct head, pain ctrl, monitor, reass.

## 2018-12-17 NOTE — H&P ADULT - ATTENDING COMMENTS
Patient seen and examined.  Agree with above pa note.  cv stable  admitted with chest pain, near syncope   sbop recently I 200's  started on norvasc with improved bp  ? sx from lower blood pressure, orthostatic changes  now back to baseline   Patient denies any chest pain, dyspnea, palpitations, cough, edema, exertional symptoms, nausea, abdominal pain, fever, chills,  or rash.    still with ha    vitals stable  nad aao x3 nc/at meck supple   no jvd cv s1s2 rrr lungs clear b/l  abd soft, nt  ext no edema    A/P    CAD, s/p PCI, HTN admitted with cp, near syncope    Syncope possible secondary to orthostatic bp changes  sbp now controlled   cont norvasc, bb, wheatley  check echo   tele  head ct    CAd, s/p pci    stable   no acs  cont asa/plavix

## 2018-12-17 NOTE — H&P ADULT - NEGATIVE OPHTHALMOLOGIC SYMPTOMS
no diplopia/no blurred vision L/no blurred vision R/no discharge R/no discharge L/no loss of vision L/no loss of vision R

## 2018-12-17 NOTE — H&P ADULT - RS GEN PE MLT RESP DETAILS PC
breath sounds equal/no rales/airway patent/no chest wall tenderness/no rhonchi/no wheezes/good air movement/clear to auscultation bilaterally

## 2018-12-17 NOTE — H&P ADULT - PROBLEM SELECTOR PLAN 2
S/P stent placement Nov 2017. CXR normal  Pt currently experiencing CP and heaviness. EKG shows NSR with incomplete RBBB  Continue Clopidogrel and ASA. Monitor for worsening CP.

## 2018-12-18 LAB
BUN SERPL-MCNC: 16 MG/DL — SIGNIFICANT CHANGE UP (ref 7–23)
CALCIUM SERPL-MCNC: 9.1 MG/DL — SIGNIFICANT CHANGE UP (ref 8.4–10.5)
CHLORIDE SERPL-SCNC: 101 MMOL/L — SIGNIFICANT CHANGE UP (ref 98–107)
CHOLEST SERPL-MCNC: 197 MG/DL — SIGNIFICANT CHANGE UP (ref 120–199)
CO2 SERPL-SCNC: 25 MMOL/L — SIGNIFICANT CHANGE UP (ref 22–31)
CREAT SERPL-MCNC: 0.9 MG/DL — SIGNIFICANT CHANGE UP (ref 0.5–1.3)
GLUCOSE SERPL-MCNC: 107 MG/DL — HIGH (ref 70–99)
HBA1C BLD-MCNC: 5.9 % — HIGH (ref 4–5.6)
HCT VFR BLD CALC: 51.1 % — HIGH (ref 39–50)
HDLC SERPL-MCNC: 48 MG/DL — SIGNIFICANT CHANGE UP (ref 35–55)
HGB BLD-MCNC: 16.8 G/DL — SIGNIFICANT CHANGE UP (ref 13–17)
LIPID PNL WITH DIRECT LDL SERPL: 160 MG/DL — SIGNIFICANT CHANGE UP
MAGNESIUM SERPL-MCNC: 2.3 MG/DL — SIGNIFICANT CHANGE UP (ref 1.6–2.6)
MCHC RBC-ENTMCNC: 31.2 PG — SIGNIFICANT CHANGE UP (ref 27–34)
MCHC RBC-ENTMCNC: 32.9 % — SIGNIFICANT CHANGE UP (ref 32–36)
MCV RBC AUTO: 94.8 FL — SIGNIFICANT CHANGE UP (ref 80–100)
NRBC # FLD: 0 — SIGNIFICANT CHANGE UP
PLATELET # BLD AUTO: 182 K/UL — SIGNIFICANT CHANGE UP (ref 150–400)
PMV BLD: 10.4 FL — SIGNIFICANT CHANGE UP (ref 7–13)
POTASSIUM SERPL-MCNC: 3.4 MMOL/L — LOW (ref 3.5–5.3)
POTASSIUM SERPL-SCNC: 3.4 MMOL/L — LOW (ref 3.5–5.3)
RBC # BLD: 5.39 M/UL — SIGNIFICANT CHANGE UP (ref 4.2–5.8)
RBC # FLD: 13 % — SIGNIFICANT CHANGE UP (ref 10.3–14.5)
SODIUM SERPL-SCNC: 141 MMOL/L — SIGNIFICANT CHANGE UP (ref 135–145)
TRIGL SERPL-MCNC: 77 MG/DL — SIGNIFICANT CHANGE UP (ref 10–149)
TSH SERPL-MCNC: 0.61 UIU/ML — SIGNIFICANT CHANGE UP (ref 0.27–4.2)
WBC # BLD: 5 K/UL — SIGNIFICANT CHANGE UP (ref 3.8–10.5)
WBC # FLD AUTO: 5 K/UL — SIGNIFICANT CHANGE UP (ref 3.8–10.5)

## 2018-12-18 PROCEDURE — 93306 TTE W/DOPPLER COMPLETE: CPT | Mod: 26

## 2018-12-18 RX ORDER — POTASSIUM CHLORIDE 20 MEQ
40 PACKET (EA) ORAL EVERY 4 HOURS
Qty: 0 | Refills: 0 | Status: COMPLETED | OUTPATIENT
Start: 2018-12-18 | End: 2018-12-18

## 2018-12-18 RX ORDER — AMLODIPINE BESYLATE 2.5 MG/1
5 TABLET ORAL DAILY
Qty: 0 | Refills: 0 | Status: DISCONTINUED | OUTPATIENT
Start: 2018-12-19 | End: 2018-12-19

## 2018-12-18 RX ADMIN — Medication 50 MILLIGRAM(S): at 05:16

## 2018-12-18 RX ADMIN — ATORVASTATIN CALCIUM 80 MILLIGRAM(S): 80 TABLET, FILM COATED ORAL at 21:21

## 2018-12-18 RX ADMIN — LISINOPRIL 40 MILLIGRAM(S): 2.5 TABLET ORAL at 05:15

## 2018-12-18 RX ADMIN — CLOPIDOGREL BISULFATE 75 MILLIGRAM(S): 75 TABLET, FILM COATED ORAL at 12:55

## 2018-12-18 RX ADMIN — Medication 40 MILLIEQUIVALENT(S): at 12:56

## 2018-12-18 RX ADMIN — AMLODIPINE BESYLATE 10 MILLIGRAM(S): 2.5 TABLET ORAL at 05:15

## 2018-12-18 RX ADMIN — TAMSULOSIN HYDROCHLORIDE 0.4 MILLIGRAM(S): 0.4 CAPSULE ORAL at 21:22

## 2018-12-18 RX ADMIN — Medication 40 MILLIEQUIVALENT(S): at 17:01

## 2018-12-18 RX ADMIN — Medication 81 MILLIGRAM(S): at 12:55

## 2018-12-18 NOTE — PROGRESS NOTE ADULT - ASSESSMENT
69 y/o M with PMH of BPH, essential HTN, cholelithiasis, hypercholesterolemia,CAD S/p PCI, and noncompliance with meds presents after an episode of near syncope    1. Syncope possible secondary to orthostatic bp changes  sbp now controlled  repeat orthostatics negative.   cont norvasc, bb, acei  echo revealed normal LV sys fx, minimal MR   head ct negative for acute infarct   c.o dizziness, ? vertigo   will decrease norvasc to 5 mg daily   consider meclizine for dizziness  neuro eval     2 CAd, s/p pci  stable, no acs  cont asa/plavix .    dvt ppx

## 2018-12-19 LAB
BUN SERPL-MCNC: 18 MG/DL — SIGNIFICANT CHANGE UP (ref 7–23)
CALCIUM SERPL-MCNC: 9 MG/DL — SIGNIFICANT CHANGE UP (ref 8.4–10.5)
CHLORIDE SERPL-SCNC: 103 MMOL/L — SIGNIFICANT CHANGE UP (ref 98–107)
CO2 SERPL-SCNC: 26 MMOL/L — SIGNIFICANT CHANGE UP (ref 22–31)
CREAT SERPL-MCNC: 0.94 MG/DL — SIGNIFICANT CHANGE UP (ref 0.5–1.3)
GLUCOSE SERPL-MCNC: 95 MG/DL — SIGNIFICANT CHANGE UP (ref 70–99)
HCT VFR BLD CALC: 49.2 % — SIGNIFICANT CHANGE UP (ref 39–50)
HGB BLD-MCNC: 16.5 G/DL — SIGNIFICANT CHANGE UP (ref 13–17)
MCHC RBC-ENTMCNC: 31.9 PG — SIGNIFICANT CHANGE UP (ref 27–34)
MCHC RBC-ENTMCNC: 33.5 % — SIGNIFICANT CHANGE UP (ref 32–36)
MCV RBC AUTO: 95.2 FL — SIGNIFICANT CHANGE UP (ref 80–100)
NRBC # FLD: 0 — SIGNIFICANT CHANGE UP
PLATELET # BLD AUTO: 169 K/UL — SIGNIFICANT CHANGE UP (ref 150–400)
PMV BLD: 10.3 FL — SIGNIFICANT CHANGE UP (ref 7–13)
POTASSIUM SERPL-MCNC: 4.4 MMOL/L — SIGNIFICANT CHANGE UP (ref 3.5–5.3)
POTASSIUM SERPL-SCNC: 4.4 MMOL/L — SIGNIFICANT CHANGE UP (ref 3.5–5.3)
RBC # BLD: 5.17 M/UL — SIGNIFICANT CHANGE UP (ref 4.2–5.8)
RBC # FLD: 13.1 % — SIGNIFICANT CHANGE UP (ref 10.3–14.5)
SODIUM SERPL-SCNC: 140 MMOL/L — SIGNIFICANT CHANGE UP (ref 135–145)
WBC # BLD: 5.18 K/UL — SIGNIFICANT CHANGE UP (ref 3.8–10.5)
WBC # FLD AUTO: 5.18 K/UL — SIGNIFICANT CHANGE UP (ref 3.8–10.5)

## 2018-12-19 PROCEDURE — 70548 MR ANGIOGRAPHY NECK W/DYE: CPT | Mod: 26

## 2018-12-19 PROCEDURE — 70551 MRI BRAIN STEM W/O DYE: CPT | Mod: 26

## 2018-12-19 RX ADMIN — ATORVASTATIN CALCIUM 80 MILLIGRAM(S): 80 TABLET, FILM COATED ORAL at 21:21

## 2018-12-19 RX ADMIN — TAMSULOSIN HYDROCHLORIDE 0.4 MILLIGRAM(S): 0.4 CAPSULE ORAL at 21:22

## 2018-12-19 RX ADMIN — CLOPIDOGREL BISULFATE 75 MILLIGRAM(S): 75 TABLET, FILM COATED ORAL at 12:20

## 2018-12-19 RX ADMIN — LISINOPRIL 40 MILLIGRAM(S): 2.5 TABLET ORAL at 05:16

## 2018-12-19 RX ADMIN — AMLODIPINE BESYLATE 5 MILLIGRAM(S): 2.5 TABLET ORAL at 05:16

## 2018-12-19 RX ADMIN — Medication 50 MILLIGRAM(S): at 05:16

## 2018-12-19 RX ADMIN — Medication 81 MILLIGRAM(S): at 12:20

## 2018-12-19 NOTE — PROGRESS NOTE ADULT - ASSESSMENT
69 y/o M with PMH of BPH, essential HTN, cholelithiasis, hypercholesterolemia,CAD S/p PCI, and noncompliance with meds presents after an episode of near syncope    1. Syncope   possible secondary to orthostatic bp changes  dizziness remains present, ? vertigo , check repeat orthostatics   cont bb, acei  echo revealed normal LV sys fx, minimal MR   head ct negative for acute infarct   decrease norvasc to 2.5 mg daily  consider meclizine for dizziness  neuro eval noted, MRI/ mra neck limited study due to patient not willing to complete exam, no obvious evidence of hemorrhage or shift, right ICA stenosis with minimal narrowing of the Left ICA without hemodynamically significant stenosis   Neuro f/u     2 CAd, s/p pci  stable, no acs  cont asa/plavix .    dvt ppx 67 y/o M with PMH of BPH, essential HTN, cholelithiasis, hypercholesterolemia,CAD S/p PCI, and noncompliance with meds presents after an episode of near syncope    1. Syncope   possible secondary to orthostatic bp changes  dizziness remains present, ? vertigo , check repeat orthostatics   cont bb, acei  echo revealed normal LV sys fx, minimal MR   head ct negative for acute infarct   stop norvasc   consider meclizine for dizziness  neuro eval noted, MRI/ mra neck limited study due to patient not willing to complete exam, no obvious evidence of hemorrhage or shift, right ICA stenosis with minimal narrowing of the Left ICA without hemodynamically significant stenosis   Neuro f/u     2 CAd, s/p pci  stable, no acs  cont asa/plavix .    dvt ppx

## 2018-12-20 ENCOUNTER — TRANSCRIPTION ENCOUNTER (OUTPATIENT)
Age: 68
End: 2018-12-20

## 2018-12-20 VITALS
HEART RATE: 74 BPM | SYSTOLIC BLOOD PRESSURE: 114 MMHG | OXYGEN SATURATION: 100 % | DIASTOLIC BLOOD PRESSURE: 63 MMHG | RESPIRATION RATE: 18 BRPM | WEIGHT: 132.28 LBS | TEMPERATURE: 98 F

## 2018-12-20 LAB
BUN SERPL-MCNC: 15 MG/DL — SIGNIFICANT CHANGE UP (ref 7–23)
CALCIUM SERPL-MCNC: 8.9 MG/DL — SIGNIFICANT CHANGE UP (ref 8.4–10.5)
CHLORIDE SERPL-SCNC: 101 MMOL/L — SIGNIFICANT CHANGE UP (ref 98–107)
CO2 SERPL-SCNC: 25 MMOL/L — SIGNIFICANT CHANGE UP (ref 22–31)
CREAT SERPL-MCNC: 0.97 MG/DL — SIGNIFICANT CHANGE UP (ref 0.5–1.3)
GLUCOSE SERPL-MCNC: 99 MG/DL — SIGNIFICANT CHANGE UP (ref 70–99)
HCT VFR BLD CALC: 44.2 % — SIGNIFICANT CHANGE UP (ref 39–50)
HGB BLD-MCNC: 14.7 G/DL — SIGNIFICANT CHANGE UP (ref 13–17)
MCHC RBC-ENTMCNC: 30.9 PG — SIGNIFICANT CHANGE UP (ref 27–34)
MCHC RBC-ENTMCNC: 33.3 % — SIGNIFICANT CHANGE UP (ref 32–36)
MCV RBC AUTO: 93.1 FL — SIGNIFICANT CHANGE UP (ref 80–100)
NRBC # FLD: 0 — SIGNIFICANT CHANGE UP
PLATELET # BLD AUTO: 163 K/UL — SIGNIFICANT CHANGE UP (ref 150–400)
PMV BLD: 10.3 FL — SIGNIFICANT CHANGE UP (ref 7–13)
POTASSIUM SERPL-MCNC: 3.9 MMOL/L — SIGNIFICANT CHANGE UP (ref 3.5–5.3)
POTASSIUM SERPL-SCNC: 3.9 MMOL/L — SIGNIFICANT CHANGE UP (ref 3.5–5.3)
RBC # BLD: 4.75 M/UL — SIGNIFICANT CHANGE UP (ref 4.2–5.8)
RBC # FLD: 12.9 % — SIGNIFICANT CHANGE UP (ref 10.3–14.5)
SODIUM SERPL-SCNC: 138 MMOL/L — SIGNIFICANT CHANGE UP (ref 135–145)
WBC # BLD: 3.89 K/UL — SIGNIFICANT CHANGE UP (ref 3.8–10.5)
WBC # FLD AUTO: 3.89 K/UL — SIGNIFICANT CHANGE UP (ref 3.8–10.5)

## 2018-12-20 RX ORDER — MECLIZINE HCL 12.5 MG
1 TABLET ORAL
Qty: 42 | Refills: 0 | OUTPATIENT
Start: 2018-12-20 | End: 2019-01-02

## 2018-12-20 RX ORDER — PREGABALIN 225 MG/1
0 CAPSULE ORAL
Qty: 0 | Refills: 0 | COMMUNITY

## 2018-12-20 RX ORDER — TIZANIDINE 4 MG/1
2 TABLET ORAL ONCE
Qty: 0 | Refills: 0 | Status: COMPLETED | OUTPATIENT
Start: 2018-12-20 | End: 2018-12-20

## 2018-12-20 RX ORDER — ASCORBIC ACID 60 MG
0 TABLET,CHEWABLE ORAL
Qty: 0 | Refills: 0 | COMMUNITY

## 2018-12-20 RX ORDER — MECLIZINE HCL 12.5 MG
2 TABLET ORAL
Qty: 84 | Refills: 0 | OUTPATIENT
Start: 2018-12-20 | End: 2019-01-02

## 2018-12-20 RX ORDER — AMLODIPINE BESYLATE 2.5 MG/1
1 TABLET ORAL
Qty: 0 | Refills: 0 | COMMUNITY

## 2018-12-20 RX ADMIN — Medication 50 MILLIGRAM(S): at 05:14

## 2018-12-20 RX ADMIN — CLOPIDOGREL BISULFATE 75 MILLIGRAM(S): 75 TABLET, FILM COATED ORAL at 12:24

## 2018-12-20 RX ADMIN — Medication 81 MILLIGRAM(S): at 12:24

## 2018-12-20 RX ADMIN — TIZANIDINE 2 MILLIGRAM(S): 4 TABLET ORAL at 15:34

## 2018-12-20 RX ADMIN — LISINOPRIL 40 MILLIGRAM(S): 2.5 TABLET ORAL at 05:14

## 2018-12-20 NOTE — DISCHARGE NOTE ADULT - NS AS ACTIVITY OBS
Do not make important decisions/Do not drive or operate machinery/No Heavy lifting/straining/Showering allowed/Walking-Indoors allowed

## 2018-12-20 NOTE — PROGRESS NOTE ADULT - SUBJECTIVE AND OBJECTIVE BOX
CARDIOLOGY FOLLOW UP - Dr. Colon    CC c.o neck pain, dizziness   no cp or sob        PHYSICAL EXAM:  T(C): 36.4 (12-18-18 @ 13:12), Max: 37.1 (12-17-18 @ 13:40)  HR: 73 (12-18-18 @ 13:12) (73 - 87)  BP: 124/70 (12-18-18 @ 13:12) (124/70 - 147/68)  RR: 18 (12-18-18 @ 13:12) (18 - 24)  SpO2: 100% (12-18-18 @ 13:12) (97% - 100%)  Wt(kg): --  I&O's Summary      Appearance: Normal	  Cardiovascular: Normal S1 S2,RRR, No JVD, No murmurs  Respiratory: Lungs clear to auscultation	  Gastrointestinal:  Soft, Non-tender, + BS	  Extremities: Normal range of motion, No clubbing, cyanosis or edema        MEDICATIONS  (STANDING):  amLODIPine   Tablet 10 milliGRAM(s) Oral daily  aspirin enteric coated 81 milliGRAM(s) Oral daily  atorvastatin 80 milliGRAM(s) Oral at bedtime  clopidogrel Tablet 75 milliGRAM(s) Oral daily  influenza   Vaccine 0.5 milliLiter(s) IntraMuscular once  lisinopril 40 milliGRAM(s) Oral daily  metoprolol succinate ER 50 milliGRAM(s) Oral daily  potassium chloride    Tablet ER 40 milliEquivalent(s) Oral every 4 hours  tamsulosin 0.4 milliGRAM(s) Oral at bedtime      TELEMETRY: NSR 	    ECG:  	  RADIOLOGY:   DIAGNOSTIC TESTING:  [ ] Echocardiogram:  < from: Transthoracic Echocardiogram (12.18.18 @ 10:35) >  Ejection Fraction (Teicholtz): 64 %  ------------------------------------------------------------------------  OBSERVATIONS:  Mitral Valve: Mitral annular calcification, otherwise  normal mitral valve. Minimal mitral regurgitation.  Aortic Root: Normal aortic root.  Aortic Valve: Calcified trileafletaortic valve with normal  opening.  Left Atrium: Normal left atrium.  Left Ventricle: Normal left ventricular systolic function.  No segmental wall motion abnormalities. Normal left  ventricular internal dimensions and wall thicknesses.  Right Heart:Normal right atrium. The right ventricle is  not well visualized; grossly normal right ventricular  systolic function. Normal tricuspid valve.  Mild tricuspid  regurgitation. Normal pulmonic valve.  Pericardium/PleuraNormal pericardium with no pericardial  effusion.  ------------------------------------------------------------------------  CONCLUSIONS:  1. Mitral annular calcification, otherwise normal mitral  valve. Minimal mitral regurgitation.  2. Normal left ventricular internal dimensions and wall  thicknesses.  3. Normal left ventricular systolic function. No segmental  wall motion abnormalities.  4. The right ventricle is not well visualized; grossly  normal right ventricular systolic function.  ------------------------------------------------------------------------  Confirmed on  12/18/2018 - 11:33:13 by Sami Delgado M.D.  ------------------------------------------------------------------------    < end of copied text >    [ ]  Catheterization:  [ ] Stress Test:    OTHER: 	  < from: CT Head No Cont (12.17.18 @ 11:51) >  IMPRESSION:    No mass effect hemorrhage or evidence of acute intracranial pathology.      < end of copied text >    LABS:	 	        CKMB: 2.37 ng/mL (12-17 @ 16:00)                          16.8   5.00  )-----------( 182      ( 18 Dec 2018 06:20 )             51.1     12-18    141  |  101  |  16  ----------------------------<  107<H>  3.4<L>   |  25  |  0.90    Ca    9.1      18 Dec 2018 06:20  Mg     2.3     12-18    TPro  7.4  /  Alb  4.4  /  TBili  1.0  /  DBili  x   /  AST  26  /  ALT  22  /  AlkPhos  57  12-17    PT/INR - ( 17 Dec 2018 10:50 )   PT: 11.1 SEC;   INR: 1.00          PTT - ( 17 Dec 2018 10:50 )  PTT:32.2 SEC
CARDIOLOGY FOLLOW UP - Dr. Colon    CC no cp or sob, dizziness slightly improving  still c.o neck pain/ mild headache     PHYSICAL EXAM:  T(C): 36.8 (12-19-18 @ 05:15), Max: 36.8 (12-19-18 @ 05:15)  HR: 75 (12-19-18 @ 05:15) (73 - 75)  BP: 120/71 (12-19-18 @ 05:15) (115/71 - 124/70)  RR: 18 (12-19-18 @ 05:15) (18 - 18)  SpO2: 100% (12-19-18 @ 05:15) (100% - 100%)  Wt(kg): --  I&O's Summary    18 Dec 2018 07:01  -  19 Dec 2018 07:00  --------------------------------------------------------  IN: 110 mL / OUT: 200 mL / NET: -90 mL        Appearance: Normal	  Cardiovascular: Normal S1 S2,RRR, No JVD, No murmurs  Respiratory: Lungs clear to auscultation	  Gastrointestinal:  Soft, Non-tender, + BS	  Extremities: Normal range of motion, No clubbing, cyanosis or edema        MEDICATIONS  (STANDING):  amLODIPine   Tablet 5 milliGRAM(s) Oral daily  aspirin enteric coated 81 milliGRAM(s) Oral daily  atorvastatin 80 milliGRAM(s) Oral at bedtime  clopidogrel Tablet 75 milliGRAM(s) Oral daily  influenza   Vaccine 0.5 milliLiter(s) IntraMuscular once  lisinopril 40 milliGRAM(s) Oral daily  metoprolol succinate ER 50 milliGRAM(s) Oral daily  tamsulosin 0.4 milliGRAM(s) Oral at bedtime      TELEMETRY:  NSR 	    ECG:  	  RADIOLOGY:   DIAGNOSTIC TESTING:  [ ] Echocardiogram:  [ ]  Catheterization:  [ ] Stress Test:    OTHER: 	    < from: MR Angio Neck w/ IV Cont (12.19.18 @ 07:33) >    EXAM:  MR ANGIO NECK IC      EXAM:  MR ANGIO BRAIN      EXAM:  MR BRAIN        PROCEDURE DATE:  Dec 19 2018     IMPRESSION:    Limited study, patient refused to continue, suggest complete imaging when   patient is currently able, there is minimal volume loss and microvascular   disease without obvious large hemorrhage or shift.    Multifocal stenosis of the cavernous and supraclinoid ICAs, Limited   assessment of the intracranial anterior, middle, posterior cerebral   artery secondary to patient not wanting to complete study.    Right ICA origin stenosis of 50% extending for 9 mm and length with   minimal narrowing at the origin of the left ICA without hemodynamically   significant stenosis by  NASCET criteria.                          < end of copied text >      LABS:	 	                                16.5   5.18  )-----------( 169      ( 19 Dec 2018 05:40 )             49.2     12-19    140  |  103  |  18  ----------------------------<  95  4.4   |  26  |  0.94    Ca    9.0      19 Dec 2018 05:40  Mg     2.3     12-18
CARDIOLOGY FOLLOW UP - Dr. Colon    CC no cp/sob   dizziness improved, pt. states he is able to walk without dizziness but the feeling still occurs with position changes       PHYSICAL EXAM:  T(C): 36.4 (12-20-18 @ 05:13), Max: 36.7 (12-19-18 @ 15:33)  HR: 65 (12-20-18 @ 05:13) (65 - 70)  BP: 123/62 (12-20-18 @ 05:13) (123/62 - 146/77)  RR: 18 (12-20-18 @ 05:13) (16 - 18)  SpO2: 100% (12-20-18 @ 05:13) (100% - 100%)  Wt(kg): --  I&O's Summary      Appearance: Normal	  Cardiovascular: Normal S1 S2,RRR, No JVD, No murmurs  Respiratory: Lungs clear to auscultation	  Gastrointestinal:  Soft, Non-tender, + BS	  Extremities: Normal range of motion, No clubbing, cyanosis or edema        MEDICATIONS  (STANDING):  aspirin enteric coated 81 milliGRAM(s) Oral daily  atorvastatin 80 milliGRAM(s) Oral at bedtime  clopidogrel Tablet 75 milliGRAM(s) Oral daily  influenza   Vaccine 0.5 milliLiter(s) IntraMuscular once  lisinopril 40 milliGRAM(s) Oral daily  metoprolol succinate ER 50 milliGRAM(s) Oral daily  tamsulosin 0.4 milliGRAM(s) Oral at bedtime      TELEMETRY: NSR, pvc 	    ECG:  	  RADIOLOGY:   DIAGNOSTIC TESTING:  [ ] Echocardiogram:  [ ]  Catheterization:  [ ] Stress Test:    OTHER: 	    LABS:	 	                                14.7   3.89  )-----------( 163      ( 20 Dec 2018 06:17 )             44.2     12-20    138  |  101  |  15  ----------------------------<  99  3.9   |  25  |  0.97    Ca    8.9      20 Dec 2018 06:17
[facility]        - Patient seen and examined.  - Today, patient is without complaints.         *****MEDICATIONS: Current medication reviewed and documented.    MEDICATIONS  (STANDING):  aspirin enteric coated 81 milliGRAM(s) Oral daily  atorvastatin 80 milliGRAM(s) Oral at bedtime  clopidogrel Tablet 75 milliGRAM(s) Oral daily  influenza   Vaccine 0.5 milliLiter(s) IntraMuscular once  lisinopril 40 milliGRAM(s) Oral daily  metoprolol succinate ER 50 milliGRAM(s) Oral daily  tamsulosin 0.4 milliGRAM(s) Oral at bedtime    MEDICATIONS  (PRN):           ***** REVIEW OF SYSTEM:  GEN: no fever, no chills, no pain  RESP: no SOB, no cough, no sputum  CVS: no chest pain, no palpitations, no edema  GI: no abdominal pain, no nausea, no vomiting, no constipation, no diarrhea  : no dysurea, no frequency  NEURO: no headache, no diziness  PSYCH: no depression, not anxious  Derm : no itching, no rash         ***** VITAL SIGNS:  T(F): 98 (18 @ 06:55), Max: 98 (18 @ 06:55)  HR: 74 (18 @ 06:55) (65 - 74)  BP: 114/63 (18 @ 06:55) (114/63 - 146/77)  RR: 18 (18 @ 06:55) (16 - 18)  SpO2: 100% (18 @ 06:55) (100% - 100%)  Wt(kg): --  ,   I&O's Summary           *****PHYSICAL EXAM: Alert oriented x 3   Attention comprehension are fair. Able to name, repeat, read without any difficulty.   Able to follow 3 step commands.     EOMI fundi not visualized,  VFF to confrontration  No facial asymmetry   Tongue is midline   Palate elevates symmetrically   Moving all 4 ext symmetrically no pronator drift.  Reflexes are symmetric throughout   sensation is grossly symmetric  Gait : able to tandem.  B/L down going toes             *****LAB AND IMAGIN.7   3.89  )-----------( 163      ( 20 Dec 2018 06:17 )             44.2                   138  |  101  |  15  ----------------------------<  99  3.9   |  25  |  0.97    Ca    8.9      20 Dec 2018 06:17                           [All pertinent recent Imaging/Reports reviewed]           *****A S S E S S M E N T   A N D   P L A N : 67 y/o M with PMH of BPH, essential HTN, cholelithiasis, hypercholesterolemia, and unstable angina S/P stent placement presents after an episode of near syncope with weakness, HA, and CP. Pt describes that this morning he stood up suddenly in his home to walk to the bedroom when he suddenly felt dizzy and weakness. Pt stated that he then called out for his girlfriend and sat down on the floor. Pt did not lose consciousness. Pt denies any symptoms prior to the episode and he has never experienced symptoms like this before. Pt notes that 4 years ago he had an episode of syncope at home that was not accompanied by similar symptoms but was never worked up after the episode by a healthcare provider. Pt also notes that he has been experiencing CP and chest heaviness for the past month which he describes as "a weight on my chest" and "a needle stabbing my chest", these episodes last a few minutes and then resolve on their own, multiple episodes per day. During interview pt is currently experiencing chest heaviness. Also notes that he has been experiencing dizziness upon standing for the past few weeks. Pt has also been experiencing intermittent HAs over the past year, which he describes as "pressure and stiffness" on the posterior head and neck, he is experiencing an episode currently. He has never been worked up for them before. Pt is also currently experiencing R lower back pain which he describes as "throbbing". Pt noted he stopped taking his Clopidogrel and ASA 1 month ago after experiencing episodes of epistaxis. Pt is also noncompliant with his Metoprolol, Atorvastatin and Ramipril. He states that he takes them occasionally because he thinks that he is on too many medications. Pt denies fevers, chills, SOB, cough, abdo pain, weight loss, fatigue, N/V/D, sick contacts or recent travel.      Problem/Recommendations 1: orthostatic presyncope resolved   ldl 160 has been off plavix for a few month due to epistaxis.    mri/mra  evidence of mild intracranial atherosclerosis and r ica stenosis of 50 %  pt on asa would recommend high dose statin if tolerable to lower risk of stroke  stroke risk factors discussed extensively, pt consumes a diet heavy in high saturated fats, advised on diet restriction and daily exercise.   medication compliance was discussed.   meclizine can be tried for symptomatic relief.       Problem/Recommendations 2: labile htn defer to medicine for management.      Problem/Recommendations 3: borderline dm   educated pt about risk factors of diabetes.   Thank you for allowing me to participate in the care of this patient. Please do not hesitate to call me if you have any  questions.              Thank you for allowing me to participate in the care of this patient. Please do not hesitate to call me if you have any  questions.        ________________  Aster Dash MD  [facility] [place] ([facility]  [phone]      30 minutes spent on total encounter; more than 50 % of the visit was  spent counseling about plan of care, compliance to diet/exercise and medication regimen and or  coordinating care by the attending physician.   At the present time, [facility] does not  provide outpatient followup, best to call the your insurance to find a participating provider.  This was explained to you at the time of the visit. Alternatively, if your insurance allows it, you can follow up with a neurologist  Dr. Popeye Denney([place]) [phone] or Dr. Michael Nissenbaum [phone]
[facility]        - Patient seen and examined.  - Today, patient is without complaints.         *****MEDICATIONS: Current medication reviewed and documented.    MEDICATIONS  (STANDING):  aspirin enteric coated 81 milliGRAM(s) Oral daily  atorvastatin 80 milliGRAM(s) Oral at bedtime  clopidogrel Tablet 75 milliGRAM(s) Oral daily  influenza   Vaccine 0.5 milliLiter(s) IntraMuscular once  lisinopril 40 milliGRAM(s) Oral daily  metoprolol succinate ER 50 milliGRAM(s) Oral daily  tamsulosin 0.4 milliGRAM(s) Oral at bedtime    MEDICATIONS  (PRN):           ***** REVIEW OF SYSTEM:  GEN: no fever, no chills, no pain  RESP: no SOB, no cough, no sputum  CVS: no chest pain, no palpitations, no edema  GI: no abdominal pain, no nausea, no vomiting, no constipation, no diarrhea  : no dysurea, no frequency  NEURO: no headache, no diziness  PSYCH: no depression, not anxious  Derm : no itching, no rash         ***** VITAL SIGNS:  T(F): 98.3 (18 @ 05:15), Max: 98.3 (18 @ 05:15)  HR: 75 (18 @ 05:15) (75 - 75)  BP: 120/71 (18 @ 05:15) (115/71 - 120/71)  RR: 18 (18 @ 05:15) (18 - 18)  SpO2: 100% (18 @ 05:15) (100% - 100%)  Wt(kg): --  ,   I&O's Summary    18 Dec 2018 07:01  -  19 Dec 2018 07:00  --------------------------------------------------------  IN: 110 mL / OUT: 200 mL / NET: -90 mL             *****PHYSICAL EXAM: Alert oriented x 3   Attention comprehension are fair. Able to name, repeat, read without any difficulty.   Able to follow 3 step commands.     EOMI fundi not visualized,  VFF to confrontration  No facial asymmetry   Tongue is midline   Palate elevates symmetrically   Moving all 4 ext symmetrically no pronator drift.  Reflexes are symmetric throughout   sensation is grossly symmetric  Gait : able to tandem.  B/L down going toes                   *****LAB AND IMAGIN.5   .18  )-----------( 169      ( 19 Dec 2018 05:40 )             49.2               12-    140  |  103  |  18  ----------------------------<  95  4.4   |  26  |  0.94    Ca    9.0      19 Dec 2018 05:40  Mg     2.3                  CARDIAC MARKERS ( 17 Dec 2018 16:00 )  x     / x     / 99 u/L / 2.37 ng/mL / x        < from: MR Angio Neck w/ IV Cont (18 @ 07:33) >  Limited study, patient refused to continue, suggest complete imaging when   patient is currently able, there is minimal volume loss and microvascular   disease without obvious large hemorrhage or shift.    Multifocal stenosis of the cavernous and supraclinoid ICAs, Limited   assessment of the intracranial anterior, middle, posterior cerebral   artery secondary to patient not wanting to complete study.    Right ICA origin stenosis of 50% extending for 9 mm and length with   minimal narrowing at the origin ofthe left ICA without hemodynamically   significant stenosis by  NASCET criteria.      < end of copied text >              [All pertinent recent Imaging/Reports reviewed]           *****A S S E S S M E N T   A N D   P L A N :       67 y/o M with PMH of BPH, essential HTN, cholelithiasis, hypercholesterolemia, and unstable angina S/P stent placement presents after an episode of near syncope with weakness, HA, and CP. Pt describes that this morning he stood up suddenly in his home to walk to the bedroom when he suddenly felt dizzy and weakness. Pt stated that he then called out for his girlfriend and sat down on the floor. Pt did not lose consciousness. Pt denies any symptoms prior to the episode and he has never experienced symptoms like this before. Pt notes that 4 years ago he had an episode of syncope at home that was not accompanied by similar symptoms but was never worked up after the episode by a healthcare provider. Pt also notes that he has been experiencing CP and chest heaviness for the past month which he describes as "a weight on my chest" and "a needle stabbing my chest", these episodes last a few minutes and then resolve on their own, multiple episodes per day. During interview pt is currently experiencing chest heaviness. Also notes that he has been experiencing dizziness upon standing for the past few weeks. Pt has also been experiencing intermittent HAs over the past year, which he describes as "pressure and stiffness" on the posterior head and neck, he is experiencing an episode currently. He has never been worked up for them before. Pt is also currently experiencing R lower back pain which he describes as "throbbing". Pt noted he stopped taking his Clopidogrel and ASA 1 month ago after experiencing episodes of epistaxis. Pt is also noncompliant with his Metoprolol, Atorvastatin and Ramipril. He states that he takes them occasionally because he thinks that he is on too many medications. Pt denies fevers, chills, SOB, cough, abdo pain, weight loss, fatigue, N/V/D, sick contacts or recent travel.      Problem/Recommendations 1: orthostatic presyncope resolved   ldl 160 has been off plavix for a few month due to epistaxis.    mri/mra  evidence of mild intracranial atherosclerosis and r ica stenosis of 50 %  pt on asa would recommend high dose statin if tolerable to lower risk of stroke  stroke risk factors discussed extensively, pt consumes a diet heavy in high saturated fats, advised on diet restriction and daily exercise.   medication compliance was discussed.       Problem/Recommendations 2: labile htn defer to medicine for management.      Problem/Recommendations 3: borderline dm   educated pt about risk factors of diabetes.   Thank you for allowing me to participate in the care of this patient. Please do not hesitate to call me if you have any  questions.        ________________  Aster Dash MD  [facility] ([facility]  [phone]      30 minutes spent on total encounter; more than 50 % of the visit was  spent counseling about plan of care, compliance to diet/exercise and medication regimen and or  coordinating care by the attending physician.   At the present time, [facility] does not  provide outpatient followup, best to call the your insurance to find a participating provider.  This was explained to you at the time of the visit. Alternatively, if your insurance allows it, you can follow up with a neurologist  Dr. Popeye Denney([place]) [phone] or Dr. Michael Nissenbaum [phone]

## 2018-12-20 NOTE — DISCHARGE NOTE ADULT - ADDITIONAL INSTRUCTIONS
Be sure to call your neurologist and cardiologist for outpatient follow up. See Dr Colon within next two weeks. Call for appointment

## 2018-12-20 NOTE — DISCHARGE NOTE ADULT - CARE PLAN
Principal Discharge DX:	Syncope  Goal:	prevent further episodes  Assessment and plan of treatment:	keep hydrated  low salt, low fat, low cholesterol   follow up with your cardiologist in one week Dr Colon  also follow up neurologist Dr Dash in one week  Secondary Diagnosis:	BPH (benign prostatic hyperplasia)  Assessment and plan of treatment:	flomax  Secondary Diagnosis:	Essential hypertension  Assessment and plan of treatment:	low salt, low fat, low cholesterol  Secondary Diagnosis:	Hypercholesterolemia Principal Discharge DX:	Syncope  Goal:	prevent further episodes  Assessment and plan of treatment:	keep hydrated low salt, low fat, low cholesterol  follow up with your cardiologist in one week Dr Colon  also follow up neurologist Dr Dash in one week. Take meclizine for dizziness. The prescription was sent to your pharmacy. Report to your doctor any further episodes of dizziness  Secondary Diagnosis:	BPH (benign prostatic hyperplasia)  Goal:	Improvement in status  Assessment and plan of treatment:	flomax. Follow up with your primary care doctor  Secondary Diagnosis:	Essential hypertension  Goal:	To maintain a normal blood pressure to prevent heart attack, stroke and renal failure.  Assessment and plan of treatment:	Low sodium and fat diet, continue anti-hypertensive medications, and follow up with primary care physician. Stop taking amlodipine  Secondary Diagnosis:	Hypercholesterolemia  Goal:	To maintain normal cholesterol levels to prevent stroke, coronary artery disease, peripheral vascular disease and heart attacks.  Assessment and plan of treatment:	Low fat diet, exercise daily and continue current medications. Follow up with primary care physician and cardiologist for management.

## 2018-12-20 NOTE — PROGRESS NOTE ADULT - REASON FOR ADMISSION
Near syncope, weakness, HA, CP

## 2018-12-20 NOTE — DISCHARGE NOTE ADULT - HOSPITAL COURSE
67 y/o Male, with a PmHx of BPH, essential HTN, cholelithiasis, hypercholesterolemia, unstable angina s/p stent placement, and noncompliance with meds, presents after an episode of near syncope with weakness, HA, and CP.  Admit to telemetry for near syncopal episode. MI ruled out + Dizziness/Near syncope - CTH neg  presyncope probably due to orthostatic hypotension which has resolved, repeated negative.         TESTS  EKG: NSR with incomplete RBBB  hsTrop: 11-->11                     HgbA1-c: 5.9  12/17 CT Head - no acute pathology  12/17 CXR - Clear lungs  12/18 Echo - EF - 64%, Mitral annular calcification, otherwise normal mitral valve. Minimal mitral regurgitation. Normal left ventricular internal dimensions and wall thicknesses. Normal left ventricular systolic function. No segmental wall motion abnormalities. The right ventricle is not well visualized; grossly normal right ventricular systolic function.   12/19 MRI/MRA Head/Neck - limited study, patient refused to continue, suggest complete imaging when patinet is currently able, there is minimal volume loss and microvascular disease without obvious large hemorrhage or shift. Multifocal stenosis of the cavernous and supraclinoid ICAs, Limited assessment of the intracranial anterior, middle, posterior cerebral artery secondary to patient not wanting to complete study. Right ICA origin stenosis of 50% extending for 9mm and length with minimal narrowing at the origin of the left ICA without hemodynamically significant stenosis by NASCET criteria.      consults  12/17 Cardio - CAD, s/p PCI, HTN admitted with cp, near syncope possible secondary to orthostatic bp changes sbp now controlled  cont norvasc, bb, wheat[place] check echo  tele  head ct CAd, s/p pci  stable  no acs cont asa/plavix   12/18 Neuro c/s Dr. Dash - orthostatic presyncope get mri/mra to r/o vbi,  given persistent dizziness  12/18 Cardio f/u - BP improved Cont current meds ECHO w normal LVEF neuro eval for dizziness .  12/19 Cardio f/u - syncope, poss secondary to orthostatic bp changes, dizziness remains present, ? vertigo, check repeat orthostatics, cont bb, acei, echo revealed normal LV sys fx, miminal MR, head CT neg for acute infarct, stop norvasc, consider meclizine for dizziness, neuro eval noted, MRI/MRA neck limited study due to patient not willing to complete exam, no obvious evidence of hemorrhage or shift, right ICA stenosis with minimal narrowing of the left ICA w/o hemodynamically significant stenosis, Neuro f/u  12/19 Neuro f/u - orthostatic presyncope resolved,  has been off plavix for a few months due to epistaxis, MRI/MRA evidence of mild intracranial atherosclerosis and r ica stenosis of 50%, pt on asa would recommend high dose statin if tolerable to lower risk of stroke, stroke risk factors discussed extensively, htn defer to medicine

## 2018-12-20 NOTE — PROGRESS NOTE ADULT - ASSESSMENT
67 y/o M with PMH of BPH, essential HTN, cholelithiasis, hypercholesterolemia,CAD S/p PCI, and noncompliance with meds presents after an episode of near syncope    1. Syncope   possible secondary to orthostatic bp changes  dizziness remains present, ? vertigo , repeat orthostatics this am improved   cont bb, acei  echo revealed normal LV sys fx, minimal MR   head ct negative for acute infarct   mri/mra evidence of mild intracranial atherosclerosis and R ica stenosis of 50 %  continue asa/statin/plavix for cva prevention, neuro f/u   consider starting meclizine 5mg TID for continued dizziness    2 CAD, s/p pci  stable, no acs  cont asa/plavix/plavix.    dvt ppx   d/c planning 67 y/o M with PMH of BPH, essential HTN, cholelithiasis, hypercholesterolemia,CAD S/p PCI, and noncompliance with meds presents after an episode of near syncope    1. Syncope   possible secondary to orthostatic bp changes  dizziness remains present, ? vertigo , repeat orthostatics this am improved   cont bb, acei  echo revealed normal LV sys fx, minimal MR   head ct negative for acute infarct   mri/mra evidence of mild intracranial atherosclerosis and R ica stenosis of 50 %  continue asa/statin/plavix for cva prevention, neuro f/u   consider starting meclizine 5mg TID for continued dizziness    2 CAD, s/p pci  stable, no acs  cont asa/plavix/statin.    dvt ppx   d/c planning 69 y/o M with PMH of BPH, essential HTN, cholelithiasis, hypercholesterolemia,CAD S/p PCI, and noncompliance with meds presents after an episode of near syncope    1. Syncope   possible secondary to orthostatic bp changes  dizziness remains present, ? vertigo , repeat orthostatics this am improved   cont bb, acei  echo revealed normal LV sys fx, minimal MR   head ct negative for acute infarct   mri/mra evidence of mild intracranial atherosclerosis and R ica stenosis of 50 %  continue asa/statin/plavix for cva prevention, neuro f/u   consider starting meclizine for continued dizziness    2 CAD, s/p pci  stable, no acs  cont asa/plavix/statin.    dvt ppx   d/c planning 67 y/o M with PMH of BPH, essential HTN, cholelithiasis, hypercholesterolemia,CAD S/p PCI, and noncompliance with meds presents after an episode of near syncope    1. Syncope   possible secondary to orthostatic bp changes  dizziness improved, repeat orthostatics this am improved   cont bb, acei  echo revealed normal LV sys fx, minimal MR   head ct negative for acute infarct   mri/mra evidence of mild intracranial atherosclerosis and R ica stenosis of 50 %  continue asa/statin/plavix for cva prevention, neuro f/u   consider starting meclizine for dizziness, ?vertigo     2 CAD, s/p pci  stable, no acs  cont asa/plavix/statin.    dvt ppx   d/c planning 69 y/o M with PMH of BPH, essential HTN, cholelithiasis, hypercholesterolemia,CAD S/p PCI, and noncompliance with meds presents after an episode of near syncope    1. Syncope secondary to orthostatic bp changes  dizziness improved, repeat orthostatics this am improved   cont bb, acei  echo revealed normal LV sys fx, minimal MR   head ct negative for acute infarct   mri/mra evidence of mild intracranial atherosclerosis and R ica stenosis of 50 %  continue asa/statin/plavix for cva prevention, neuro f/u   consider starting meclizine for dizziness, ?vertigo     2 CAD, s/p pci  stable, no acs  cont asa/plavix/statin.    dvt ppx   d/c planning

## 2018-12-20 NOTE — PROGRESS NOTE ADULT - ATTENDING COMMENTS
agree with above  BP improved  Cont current meds  ECHO w normal LVEF  neuro eval for persistent dizziness
Agree with above NP note.  cv stable  cont current tx
Agree with above NP note.  cv stable  syncope sec to orthostatic hypotension  now resolved  no neuro etiology  cont current tx  meclizine as needed

## 2018-12-20 NOTE — DISCHARGE NOTE ADULT - PATIENT PORTAL LINK FT
You can access the [facility] Patient Portal, offered by [facility], by registering with the following website: [email or web address]

## 2018-12-20 NOTE — DISCHARGE NOTE ADULT - CARE PROVIDER_API CALL
Aster Dash), Neurology; Vascular Neurology  [address] [place] [address]  Phone: [phone]  Fax: [phone]    Sami Colon), Cardiovascular Disease; Internal Medicine; Interventional Cardiology; Nuclear Cardiology  [address] [facility] [address]  [place] [address]  Phone: [phone]  Fax: [phone]

## 2018-12-20 NOTE — DISCHARGE NOTE ADULT - PLAN OF CARE
prevent further episodes keep hydrated  low salt, low fat, low cholesterol   follow up with your cardiologist in one week Dr Colon  also follow up neurologist Dr Dash in one week flomax low salt, low fat, low cholesterol keep hydrated low salt, low fat, low cholesterol  follow up with your cardiologist in one week Dr Colon  also follow up neurologist Dr Dash in one week. Take meclizine for dizziness. The prescription was sent to your pharmacy. Report to your doctor any further episodes of dizziness Improvement in status flomax. Follow up with your primary care doctor To maintain a normal blood pressure to prevent heart attack, stroke and renal failure. Low sodium and fat diet, continue anti-hypertensive medications, and follow up with primary care physician. Stop taking amlodipine To maintain normal cholesterol levels to prevent stroke, coronary artery disease, peripheral vascular disease and heart attacks. Low fat diet, exercise daily and continue current medications. Follow up with primary care physician and cardiologist for management.

## 2018-12-20 NOTE — DISCHARGE NOTE ADULT - MEDICATION SUMMARY - MEDICATIONS TO TAKE
I will START or STAY ON the medications listed below when I get home from the hospital:    aspirin 81 mg oral delayed release tablet  -- 1 tab(s) by mouth once a day  -- Indication: For CaD    ramipril 10 mg oral capsule  -- 1 cap(s) by mouth 2 times a day  -- Indication: For CaD    Flomax 0.4 mg oral capsule  -- 1 cap(s) by mouth once a day  -- Indication: For BPH    atorvastatin 80 mg oral tablet  -- 1 tab(s) by mouth once a day (at bedtime)  -- Indication: For Cad    clopidogrel 75 mg oral tablet  -- 1 tab(s) by mouth once a day  -- Indication: For Cad    metoprolol succinate 50 mg oral tablet, extended release  -- 1 tab(s) by mouth once a day  -- Indication: For Cad I will START or STAY ON the medications listed below when I get home from the hospital:    aspirin 81 mg oral delayed release tablet  -- 1 tab(s) by mouth once a day  -- Indication: For CaD    ramipril 10 mg oral capsule  -- 1 cap(s) by mouth 2 times a day  -- Indication: For CaD    Flomax 0.4 mg oral capsule  -- 1 cap(s) by mouth once a day  -- Indication: For BPH    meclizine 25 mg oral tablet  -- 1 tab(s) by mouth 3 times a day   -- May cause drowsiness.  Alcohol may intensify this effect.  Use care when operating dangerous machinery.    -- Indication: For Dizziness    atorvastatin 80 mg oral tablet  -- 1 tab(s) by mouth once a day (at bedtime)  -- Indication: For Cad    clopidogrel 75 mg oral tablet  -- 1 tab(s) by mouth once a day  -- Indication: For Cad    metoprolol succinate 50 mg oral tablet, extended release  -- 1 tab(s) by mouth once a day  -- Indication: For Cad

## 2019-04-02 ENCOUNTER — INPATIENT (INPATIENT)
Facility: HOSPITAL | Age: 69
LOS: 3 days | Discharge: HOME | End: 2019-04-06
Attending: INTERNAL MEDICINE | Admitting: INTERNAL MEDICINE
Payer: MEDICARE

## 2019-04-02 VITALS
OXYGEN SATURATION: 99 % | TEMPERATURE: 98 F | SYSTOLIC BLOOD PRESSURE: 137 MMHG | RESPIRATION RATE: 18 BRPM | DIASTOLIC BLOOD PRESSURE: 54 MMHG | HEART RATE: 64 BPM

## 2019-04-02 DIAGNOSIS — Z29.9 ENCOUNTER FOR PROPHYLACTIC MEASURES, UNSPECIFIED: ICD-10-CM

## 2019-04-02 DIAGNOSIS — I65.29 OCCLUSION AND STENOSIS OF UNSPECIFIED CAROTID ARTERY: ICD-10-CM

## 2019-04-02 DIAGNOSIS — Z95.5 PRESENCE OF CORONARY ANGIOPLASTY IMPLANT AND GRAFT: Chronic | ICD-10-CM

## 2019-04-02 DIAGNOSIS — R55 SYNCOPE AND COLLAPSE: ICD-10-CM

## 2019-04-02 DIAGNOSIS — I25.10 ATHEROSCLEROTIC HEART DISEASE OF NATIVE CORONARY ARTERY WITHOUT ANGINA PECTORIS: ICD-10-CM

## 2019-04-02 DIAGNOSIS — R07.89 OTHER CHEST PAIN: ICD-10-CM

## 2019-04-02 DIAGNOSIS — N40.0 BENIGN PROSTATIC HYPERPLASIA WITHOUT LOWER URINARY TRACT SYMPTOMS: ICD-10-CM

## 2019-04-02 DIAGNOSIS — E78.5 HYPERLIPIDEMIA, UNSPECIFIED: ICD-10-CM

## 2019-04-02 DIAGNOSIS — I10 ESSENTIAL (PRIMARY) HYPERTENSION: ICD-10-CM

## 2019-04-02 LAB
ALBUMIN SERPL ELPH-MCNC: 4.3 G/DL — SIGNIFICANT CHANGE UP (ref 3.3–5)
ALP SERPL-CCNC: 60 U/L — SIGNIFICANT CHANGE UP (ref 40–120)
ALT FLD-CCNC: 29 U/L — SIGNIFICANT CHANGE UP (ref 4–41)
ANION GAP SERPL CALC-SCNC: 13 MMO/L — SIGNIFICANT CHANGE UP (ref 7–14)
AST SERPL-CCNC: 27 U/L — SIGNIFICANT CHANGE UP (ref 4–40)
BASE EXCESS BLDV CALC-SCNC: 4.1 MMOL/L — SIGNIFICANT CHANGE UP
BASOPHILS # BLD AUTO: 0.04 K/UL — SIGNIFICANT CHANGE UP (ref 0–0.2)
BASOPHILS NFR BLD AUTO: 0.6 % — SIGNIFICANT CHANGE UP (ref 0–2)
BILIRUB SERPL-MCNC: 0.8 MG/DL — SIGNIFICANT CHANGE UP (ref 0.2–1.2)
BUN SERPL-MCNC: 19 MG/DL — SIGNIFICANT CHANGE UP (ref 7–23)
CALCIUM SERPL-MCNC: 9 MG/DL — SIGNIFICANT CHANGE UP (ref 8.4–10.5)
CHLORIDE SERPL-SCNC: 100 MMOL/L — SIGNIFICANT CHANGE UP (ref 98–107)
CO2 SERPL-SCNC: 28 MMOL/L — SIGNIFICANT CHANGE UP (ref 22–31)
CREAT SERPL-MCNC: 1.13 MG/DL — SIGNIFICANT CHANGE UP (ref 0.5–1.3)
EOSINOPHIL # BLD AUTO: 0.17 K/UL — SIGNIFICANT CHANGE UP (ref 0–0.5)
EOSINOPHIL NFR BLD AUTO: 2.4 % — SIGNIFICANT CHANGE UP (ref 0–6)
GAS PNL BLDV: 139 MMOL/L — SIGNIFICANT CHANGE UP (ref 136–146)
GLUCOSE BLDV-MCNC: 95 — SIGNIFICANT CHANGE UP (ref 70–99)
GLUCOSE SERPL-MCNC: 102 MG/DL — HIGH (ref 70–99)
HCO3 BLDV-SCNC: 27 MMOL/L — SIGNIFICANT CHANGE UP (ref 20–27)
HCT VFR BLD CALC: 46.3 % — SIGNIFICANT CHANGE UP (ref 39–50)
HCT VFR BLDV CALC: 45.8 % — SIGNIFICANT CHANGE UP (ref 39–51)
HGB BLD-MCNC: 15.5 G/DL — SIGNIFICANT CHANGE UP (ref 13–17)
HGB BLDV-MCNC: 15 G/DL — SIGNIFICANT CHANGE UP (ref 13–17)
IMM GRANULOCYTES NFR BLD AUTO: 0.3 % — SIGNIFICANT CHANGE UP (ref 0–1.5)
LYMPHOCYTES # BLD AUTO: 1.13 K/UL — SIGNIFICANT CHANGE UP (ref 1–3.3)
LYMPHOCYTES # BLD AUTO: 15.8 % — SIGNIFICANT CHANGE UP (ref 13–44)
MCHC RBC-ENTMCNC: 31 PG — SIGNIFICANT CHANGE UP (ref 27–34)
MCHC RBC-ENTMCNC: 33.5 % — SIGNIFICANT CHANGE UP (ref 32–36)
MCV RBC AUTO: 92.6 FL — SIGNIFICANT CHANGE UP (ref 80–100)
MONOCYTES # BLD AUTO: 0.52 K/UL — SIGNIFICANT CHANGE UP (ref 0–0.9)
MONOCYTES NFR BLD AUTO: 7.3 % — SIGNIFICANT CHANGE UP (ref 2–14)
NEUTROPHILS # BLD AUTO: 5.29 K/UL — SIGNIFICANT CHANGE UP (ref 1.8–7.4)
NEUTROPHILS NFR BLD AUTO: 73.6 % — SIGNIFICANT CHANGE UP (ref 43–77)
NRBC # FLD: 0 K/UL — SIGNIFICANT CHANGE UP (ref 0–0)
PCO2 BLDV: 50 MMHG — SIGNIFICANT CHANGE UP (ref 41–51)
PH BLDV: 7.38 PH — SIGNIFICANT CHANGE UP (ref 7.32–7.43)
PLATELET # BLD AUTO: 163 K/UL — SIGNIFICANT CHANGE UP (ref 150–400)
PMV BLD: 10.4 FL — SIGNIFICANT CHANGE UP (ref 7–13)
PO2 BLDV: 45 MMHG — HIGH (ref 35–40)
POTASSIUM BLDV-SCNC: 4.1 MMOL/L — SIGNIFICANT CHANGE UP (ref 3.4–4.5)
POTASSIUM SERPL-MCNC: 3.6 MMOL/L — SIGNIFICANT CHANGE UP (ref 3.5–5.3)
POTASSIUM SERPL-SCNC: 3.6 MMOL/L — SIGNIFICANT CHANGE UP (ref 3.5–5.3)
PROT SERPL-MCNC: 7.2 G/DL — SIGNIFICANT CHANGE UP (ref 6–8.3)
RBC # BLD: 5 M/UL — SIGNIFICANT CHANGE UP (ref 4.2–5.8)
RBC # FLD: 12.8 % — SIGNIFICANT CHANGE UP (ref 10.3–14.5)
SAO2 % BLDV: 77.8 % — SIGNIFICANT CHANGE UP (ref 60–85)
SODIUM SERPL-SCNC: 141 MMOL/L — SIGNIFICANT CHANGE UP (ref 135–145)
TROPONIN T, HIGH SENSITIVITY: 10 NG/L — SIGNIFICANT CHANGE UP (ref ?–14)
TROPONIN T, HIGH SENSITIVITY: 8 NG/L — SIGNIFICANT CHANGE UP (ref ?–14)
WBC # BLD: 7.17 K/UL — SIGNIFICANT CHANGE UP (ref 3.8–10.5)
WBC # FLD AUTO: 7.17 K/UL — SIGNIFICANT CHANGE UP (ref 3.8–10.5)

## 2019-04-02 PROCEDURE — 70496 CT ANGIOGRAPHY HEAD: CPT | Mod: 26

## 2019-04-02 PROCEDURE — 71046 X-RAY EXAM CHEST 2 VIEWS: CPT | Mod: 26

## 2019-04-02 PROCEDURE — 70498 CT ANGIOGRAPHY NECK: CPT | Mod: 26

## 2019-04-02 RX ORDER — AMLODIPINE BESYLATE 2.5 MG/1
2.5 TABLET ORAL DAILY
Qty: 0 | Refills: 0 | Status: DISCONTINUED | OUTPATIENT
Start: 2019-04-02 | End: 2019-04-06

## 2019-04-02 RX ORDER — ASPIRIN/CALCIUM CARB/MAGNESIUM 324 MG
81 TABLET ORAL DAILY
Qty: 0 | Refills: 0 | Status: DISCONTINUED | OUTPATIENT
Start: 2019-04-02 | End: 2019-04-06

## 2019-04-02 RX ORDER — LISINOPRIL 2.5 MG/1
40 TABLET ORAL DAILY
Qty: 0 | Refills: 0 | Status: DISCONTINUED | OUTPATIENT
Start: 2019-04-02 | End: 2019-04-06

## 2019-04-02 RX ORDER — RAMIPRIL 5 MG
1 CAPSULE ORAL
Qty: 0 | Refills: 0 | COMMUNITY

## 2019-04-02 RX ORDER — METOPROLOL TARTRATE 50 MG
50 TABLET ORAL DAILY
Qty: 0 | Refills: 0 | Status: DISCONTINUED | OUTPATIENT
Start: 2019-04-02 | End: 2019-04-06

## 2019-04-02 RX ORDER — ATORVASTATIN CALCIUM 80 MG/1
80 TABLET, FILM COATED ORAL AT BEDTIME
Qty: 0 | Refills: 0 | Status: DISCONTINUED | OUTPATIENT
Start: 2019-04-02 | End: 2019-04-06

## 2019-04-02 RX ORDER — ENOXAPARIN SODIUM 100 MG/ML
40 INJECTION SUBCUTANEOUS EVERY 24 HOURS
Qty: 0 | Refills: 0 | Status: DISCONTINUED | OUTPATIENT
Start: 2019-04-02 | End: 2019-04-06

## 2019-04-02 RX ORDER — CLOPIDOGREL BISULFATE 75 MG/1
75 TABLET, FILM COATED ORAL DAILY
Qty: 0 | Refills: 0 | Status: DISCONTINUED | OUTPATIENT
Start: 2019-04-02 | End: 2019-04-06

## 2019-04-02 RX ORDER — TAMSULOSIN HYDROCHLORIDE 0.4 MG/1
0.4 CAPSULE ORAL AT BEDTIME
Qty: 0 | Refills: 0 | Status: DISCONTINUED | OUTPATIENT
Start: 2019-04-02 | End: 2019-04-06

## 2019-04-02 RX ORDER — MECLIZINE HCL 12.5 MG
25 TABLET ORAL EVERY 8 HOURS
Qty: 0 | Refills: 0 | Status: DISCONTINUED | OUTPATIENT
Start: 2019-04-02 | End: 2019-04-04

## 2019-04-02 RX ADMIN — CLOPIDOGREL BISULFATE 75 MILLIGRAM(S): 75 TABLET, FILM COATED ORAL at 12:35

## 2019-04-02 RX ADMIN — ATORVASTATIN CALCIUM 80 MILLIGRAM(S): 80 TABLET, FILM COATED ORAL at 22:37

## 2019-04-02 RX ADMIN — LISINOPRIL 40 MILLIGRAM(S): 2.5 TABLET ORAL at 12:35

## 2019-04-02 RX ADMIN — Medication 50 MILLIGRAM(S): at 12:35

## 2019-04-02 RX ADMIN — TAMSULOSIN HYDROCHLORIDE 0.4 MILLIGRAM(S): 0.4 CAPSULE ORAL at 22:37

## 2019-04-02 RX ADMIN — AMLODIPINE BESYLATE 2.5 MILLIGRAM(S): 2.5 TABLET ORAL at 12:35

## 2019-04-02 RX ADMIN — Medication 81 MILLIGRAM(S): at 12:35

## 2019-04-02 NOTE — H&P ADULT - NSHPSOCIALHISTORY_GEN_ALL_CORE
Pt is  and unemployed. He is independent at baseline. He is a former smoker (quit 5 years ago) and denies drinking.

## 2019-04-02 NOTE — ED PROVIDER NOTE - NS ED ROS FT
GENERAL: No fever or chills, EYES: no change in vision, HEENT: +Headache, no trouble swallowing or speaking, CARDIAC: no chest pain, PULMONARY: no cough or SOB, GI: no abdominal pain, no nausea, no vomiting, no diarrhea or constipation, : No changes in urination, SKIN: no rashes, NEURO: no headache,  MSK: No joint pain ~Lj Petit M.D. Resident GENERAL: No fever or chills, EYES: no change in vision, HEENT: no trouble swallowing or speaking, CARDIAC: no chest pain, PULMONARY: no cough or SOB, GI: no abdominal pain, no nausea, no vomiting, no diarrhea or constipation, : No changes in urination, SKIN: no rashes, NEURO: +headache, +lightheaded   MSK: No joint pain ~Lj Petit M.D. Resident

## 2019-04-02 NOTE — H&P ADULT - ASSESSMENT
70 y/o male with a PMHx of CAD S/P stent placement to LAD, HTN, HLD, BPH and cholelithiasis presents to ED with headache, dizziness and near syncope.

## 2019-04-02 NOTE — PROGRESS NOTE ADULT - ASSESSMENT
ECHO 12/18/2018: normal LV fx, EF 64%  mri/mra 12/19/18 evidence of mild intracranial atherosclerosis and R ica stenosis of 50 %    a/p  67 y/o M with PMH of BPH, essential HTN, cholelithiasis, hypercholesterolemia, CAD S/p PCI to mid LAD (11/2017, [facility], [facility]) carotid disease and noncompliance with meds presents after an episode of near syncope, headache and chest heaviness     1. Near Syncope   pt with hx of near syncope secondary to orthostatic bp changes  check EKG   check orthostatic BP   cont bb, acei  recent echo revealed normal LV sys fx, minimal MR   continue asa/statin/plavix for cva prevention  noncompliance with meclizine, restart meclizine for dizziness, ?vertigo   neuro eval   pending CTangio head/neck     2. Carotid Disease  Continue asa + statin daily.   pending ct angio head/ neck     3. CAD, s/p pci, atypical chest pain  stable, no acs, HS trop x1 negative   cont asa/plavix/statin    4. HTN   bp noted, resume outpt anti-htn meds       dvt ppx ECHO 12/18/2018: normal LV fx, EF 64%  mri/mra 12/19/18 evidence of mild intracranial atherosclerosis and R ica stenosis of 50 %    a/p  70 y/o M with PMH of BPH, essential HTN, cholelithiasis, hypercholesterolemia, CAD S/p PCI to mid LAD (11/2017, [facility], [facility]) carotid disease and noncompliance with meds presents after an episode of near syncope, headache and chest heaviness     1. Near Syncope   pt with hx of near syncope secondary to orthostatic bp changes  check EKG   check orthostatic BP   cont bb, acei  recent echo revealed normal LV sys fx, minimal MR   continue asa/statin/plavix for cva prevention  noncompliance with meclizine, restart meclizine for dizziness, ?vertigo   neuro eval   pending CTangio head/neck     2. Carotid Disease  Continue asa + statin daily.   pending ct angio head/ neck     3. CAD, s/p pci, atypical chest pain  stable, no acs, HS trop x1 negative   cont asa/plavix/statin    4. HTN   bp noted, resume outpt anti-htn meds       dvt ppx

## 2019-04-02 NOTE — H&P ADULT - RS GEN PE MLT RESP DETAILS PC
breath sounds equal/airway patent/clear to auscultation bilaterally/respirations non-labored/no intercostal retractions/no rales/no rhonchi/no wheezes/no chest wall tenderness/good air movement

## 2019-04-02 NOTE — H&P ADULT - PROBLEM SELECTOR PLAN 2
Pt with atypical chest pain  EKG NSR with IRBBB and no ischemic changes  Initial troponin 10, repeat delta trop pending  Continue ASA, Plavix, Lipitor, Metoprolol and Lisinopril  Further workup as per cards  DASH diet

## 2019-04-02 NOTE — ED PROVIDER NOTE - TIMING
[address], [address]    [facility] [address]    Phone:  [phone]                                       After Visit Summary   10/23/2018    Marci Barber    MRN: [id number]           After Visit Summary Signature Page     I have received my discharge instructions, and my questions have been answered. I have discussed any challenges I see with this plan with the nurse or doctor.    ..........................................................................................................................................  Patient/Patient Representative Signature      ..........................................................................................................................................  Patient Representative Print Name and Relationship to Patient    ..................................................               ................................................  Date                                   Time    ..........................................................................................................................................  Reviewed by Signature/Title    ...................................................              ..............................................  Date                                               Time          22EPIC Rev 08/18         sudden onset

## 2019-04-02 NOTE — ED PROVIDER NOTE - ATTENDING CONTRIBUTION TO CARE
69M c/o neck pain rad to head then passed out  -Dr. Garcia performed an H&P simultaneously  -given history high concern for cardiac etiology of syncope  -agree with Dr. Garcia that pt will need ecg, labs, cxr  -Dr. Garcia spoke to pts private cardiologist, Dr. Colon and we all agree admission is indicated

## 2019-04-02 NOTE — ED ADULT NURSE REASSESSMENT NOTE - NS ED NURSE REASSESS COMMENT FT1
pt vs as charted. pt denies dizziness, cp, sob at this time. pt able to ambulate to bathroom w/ out difficulty. NSR on cardiac monitor. will continue to monitor.

## 2019-04-02 NOTE — PROGRESS NOTE ADULT - SUBJECTIVE AND OBJECTIVE BOX
CARDIOLOGY FOLLOW UP - Dr. Colon    CC   69yM with h/o PMH of BPH, essential HTN, cholelithiasis, hypercholesterolemia,CAD S/p PCI,  presents with headache of one day duration and near syncope. He also endorses intermittent chest heaviness.   Zane is known to the practice and was seen on last admission for similar complaints of  near syncope secondary to orthostatic blood pressure changes.  During last admission  he underwent a head CT which was negative for acute infarct and had a MRI/ MRA of head and neck which revealed evidence of mild intracranial atherosclerosis and right ICA stenosis of 50%. His blood pressure was optimized and was discharged on ramipril 10 BID, metoprolol succ 50 mg and meclizine 25 mg TID. He reports being compliant with anti-hypertensive medications however has not taken meclizine as indicated.  full h/p to follow       PHYSICAL EXAM:  T(C): 36.7 (04-02-19 @ 10:27), Max: 36.7 (04-02-19 @ 10:27)  HR: 71 (04-02-19 @ 11:20) (64 - 71)  BP: 175/74 (04-02-19 @ 11:20) (137/54 - 175/74)  RR: 18 (04-02-19 @ 11:20) (18 - 18)  SpO2: 100% (04-02-19 @ 11:20) (99% - 100%)  Wt(kg): --  I&O's Summary      Appearance: Normal	  Cardiovascular: Normal S1 S2,RRR, No JVD, No murmurs  Respiratory: Lungs clear to auscultation	  Gastrointestinal:  Soft, Non-tender, + BS	  Extremities: Normal range of motion, No clubbing, cyanosis or edema        MEDICATIONS  (STANDING):  amLODIPine   Tablet 2.5 milliGRAM(s) Oral daily  aspirin enteric coated 81 milliGRAM(s) Oral daily  atorvastatin 80 milliGRAM(s) Oral at bedtime  clopidogrel Tablet 75 milliGRAM(s) Oral daily  enoxaparin Injectable 40 milliGRAM(s) SubCutaneous every 24 hours  lisinopril 40 milliGRAM(s) Oral daily  metoprolol succinate ER 50 milliGRAM(s) Oral daily  tamsulosin 0.4 milliGRAM(s) Oral at bedtime      TELEMETRY: 	    ECG: NSR @ 70 bom with incomp RBBB 	  RADIOLOGY:   DIAGNOSTIC TESTING:  [ ] Echocardiogram:  [ ]  Catheterization:  [ ] Stress Test:    OTHER: 	    LABS:	 	                                15.5   7.17  )-----------( 163      ( 02 Apr 2019 11:10 )             46.3     04-02    141  |  100  |  19  ----------------------------<  102<H>  3.6   |  28  |  1.13    Ca    9.0      02 Apr 2019 11:10    TPro  7.2  /  Alb  4.3  /  TBili  0.8  /  DBili  x   /  AST  27  /  ALT  29  /  AlkPhos  60  04-02

## 2019-04-02 NOTE — ED ADULT TRIAGE NOTE - CHIEF COMPLAINT QUOTE
Patient brought to ER by EMS from home. Patient has had a headache for 2 days and pain in his neck also. Dizziness and weakness when he walks.

## 2019-04-02 NOTE — H&P ADULT - PROBLEM SELECTOR PLAN 1
Near syncope of unclear etiology, likely neurological  EKG NSR with IRBBB and no ischemic changes  Initial troponin 10, repeat delta trop pending  Pt non-compliant with Meclizine so will resume  CT angio head/neck pending  Neuro consult with Dr. Dash called  Orthostatics ordered  Recent echo with normal LV function

## 2019-04-02 NOTE — H&P ADULT - NSICDXPASTMEDICALHX_GEN_ALL_CORE_FT
PAST MEDICAL HISTORY:  BPH (benign prostatic hyperplasia)     CAD (coronary artery disease) S/P coronary artery stent placement    Carotid artery stenosis     Cholelithiasis     HLD (hyperlipidemia)     HTN (hypertension)

## 2019-04-02 NOTE — ED ADULT NURSE NOTE - NSIMPLEMENTINTERV_GEN_ALL_ED
Implemented All Universal Safety Interventions:  [place] to call system. Call bell, personal items and telephone within reach. Instruct patient to call for assistance. Room bathroom lighting operational. Non-slip footwear when patient is off stretcher. Physically safe environment: no spills, clutter or unnecessary equipment. Stretcher in lowest position, wheels locked, appropriate side rails in place.

## 2019-04-02 NOTE — CONSULT NOTE ADULT - SUBJECTIVE AND OBJECTIVE BOX
[facility]), [facility]        Patient is a 69y old  Male who presents with a chief complaint of Headache, dizziness (02 Apr 2019 12:42)      HPI:  68 y/o male with a PMHx of CAD S/P stent placement to LAD, HTN, HLD, BPH and cholelithiasis presents to ED with headache and dizziness this morning. Pt woke up in his usual state of health this morning. After having some breakfast, pt developed mild neck pain which radiates to the head. Pt cannot identify any provoking factors for his symptoms. Pt admits that his headache feels like a dull pain, which is associated with feeling weak, fatigued and diaphoretic. Pt felt unsteady on his feet, as if he was going to pass out. Pt then sat down to prevent himself from falling or passing out. Pt denies LOC or head trauma. Pt admits that his symptoms resolved within one hour without intervention. Pt had similar complaints in November 2018 and underwent MRI head which showed carotid stenosis. Pt was recommended to take Meclizine PRN for dizziness/vertigo but he hasn't been. In addition, pt admits to episodes of mild, non-radiating, non-pleuritic, and non-exertional, substernal chest pain, which comes and goes without intervention. Pt cannot identify any provoking factors. Pt denies fever, chills, recent travel, visual deficits, shortness of breath, orthopnea, palpitations, abdominal pain, N/V/D/C, hematochezia, melena, dysuria, hematuria, LOC, syncope, incontinence, peripheral edema. Upon arrival to ED, EKG: NSR at 70 bpm with IRBBB. CE x1: Trop 10. CXR: Clear lungs. No pleural effusions or pneumothorax. Cardiac and mediastinal silhouettes within normal limits. Trachea midline. Spinal degenerative change again noted. (02 Apr 2019 12:42)    pt reports that his neck pain always seems to trigger his syncopal events.            *****PAST MEDICAL / Surgical  HISTORY:  PAST MEDICAL & SURGICAL HISTORY:  Carotid artery stenosis  CAD (coronary artery disease): S/P coronary artery stent placement  HLD (hyperlipidemia)  HTN (hypertension)  Cholelithiasis  BPH (benign prostatic hyperplasia)  S/P coronary artery stent placement           *****FAMILY HISTORY:  FAMILY HISTORY:  No pertinent family history in first degree relatives           *****SOCIAL HISTORY:  Alcohol: None  Smoking: None         *****ALLERGIES:   Allergies    No Known Allergies    Intolerances             *****MEDICATIONS: current medication reviewed and documented.   MEDICATIONS  (STANDING):  amLODIPine   Tablet 2.5 milliGRAM(s) Oral daily  aspirin enteric coated 81 milliGRAM(s) Oral daily  atorvastatin 80 milliGRAM(s) Oral at bedtime  clopidogrel Tablet 75 milliGRAM(s) Oral daily  enoxaparin Injectable 40 milliGRAM(s) SubCutaneous every 24 hours  lisinopril 40 milliGRAM(s) Oral daily  metoprolol succinate ER 50 milliGRAM(s) Oral daily  tamsulosin 0.4 milliGRAM(s) Oral at bedtime    MEDICATIONS  (PRN):  meclizine 25 milliGRAM(s) Oral every 8 hours PRN Dizziness           *****REVIEW OF SYSTEM:  GEN: no fever, no chills, no pain  RESP: no SOB, no cough, no sputum  CVS: no chest pain, no palpitations, no edema  GI: no abdominal pain, no nausea, no vomiting, no constipation, no diarrhea  : no dysurea, no frequency, no hematurea  Neuro: no headache, no dizziness  PSYCH: no anxiety, no depression  Derm : no itching, no rash         *****VITAL SIGNS:  T(C): 36.6 (04-02-19 @ 14:46), Max: 36.7 (04-02-19 @ 10:27)  HR: 67 (04-02-19 @ 14:46) (64 - 71)  BP: 132/64 (04-02-19 @ 14:46) (132/64 - 175/74)  RR: 17 (04-02-19 @ 14:46) (17 - 18)  SpO2: 100% (04-02-19 @ 14:46) (99% - 100%)  Wt(kg): --           *****PHYSICAL EXAM:   Alert oriented x 3   Attention comprehension are fair. Able to name, repeat, read without any difficulty.   Able to follow 3 step commands.     EOMI fundi not visualized,  VFF to confrontration  No facial asymmetry   Tongue is midline   Palate elevates symmetrically   Moving all 4 ext symmetrically no pronator drift   Reflexes are symmetric throughout   sensation is grossly symmetric  Gait : not assessed.  B/L down going toes               *****LAB AND IMAGING:                          15.5   7.17  )-----------( 163      ( 02 Apr 2019 11:10 )             46.3               04-02    141  |  100  |  19  ----------------------------<  102<H>  3.6   |  28  |  1.13    Ca    9.0      02 Apr 2019 11:10    TPro  7.2  /  Alb  4.3  /  TBili  0.8  /  DBili  x   /  AST  27  /  ALT  29  /  AlkPhos  60  04-02                < from: CT Angio Neck w/ IV Cont (04.02.19 @ 13:16) >  Periventricular white matter lucencies seen which is likely related to   chronic microvascular ischemic changes    There is no evidence acute hemorrhage mass or mass effect in the   posterior fossa or supratentorial region.    Evaluation of the osseous structures with appropriate window appears   unremarkable.    Bilateral maxillary and ethmoid sinus mucosal thickening is seen.    Both mastoid and middle ear regions appear clear.    Both distal common carotid arteries appear normal.    There is evidence of a calcified and soft tissue plaque seen involving   the dorsal aspect of the proximal right internal carotid artery. There is   a 55% stenosis using the NASCET criteria. Evaluation of proximal right   external carotid artery appears normal.    Calcified plaque is seen involving the left carotid bifurcation region.   Evaluation of the proximal left internal and external carotid arteries   appear normal without significant stenosis.    Both vertebral arteries appear unremarkable    The dural venous sinuses demonstrate normal enhancement with no evidence   of venous sinus thrombosis.    Evaluation of the soft tissue neck region appears unremarkable      < end of copied text >            < from: MR Angio Neck w/ IV Cont (12.19.18 @ 07:33) >  Limited study, patient refused to continue, suggest complete imaging when   patient is currently able, there is minimal volume loss and microvascular   disease without obvious large hemorrhage or shift.    Multifocal stenosis of the cavernous and supraclinoid ICAs, Limited   assessment of the intracranial anterior, middle, posterior cerebral   artery secondary to patient not wanting to complete study.    Right ICA origin stenosis of 50% extending for 9 mm and length with   minimal narrowing at the origin ofthe left ICA without hemodynamically   significant stenosis by  NASCET criteria.                < end of copied text >        [All pertinent recent Imaging reports reviewed]         *****A S S E S S M E N T   A N D   P L A N :     68 y/o male with a PMHx of CAD S/P stent placement to LAD, HTN, HLD, BPH and cholelithiasis presents to ED with headache and dizziness this morning. Pt woke up in his usual state of health this morning. After having some breakfast, pt developed mild neck pain which radiates to the head. Pt cannot identify any provoking factors for his symptoms. Pt admits that his headache feels like a dull pain, which is associated with feeling weak, fatigued and diaphoretic. Pt felt unsteady on his feet, as if he was going to pass out. Pt then sat down to prevent himself from falling or passing out. Pt denies LOC or head trauma. Pt admits that his symptoms resolved within one hour without intervention. Pt had similar complaints in November 2018 and underwent MRI head which showed carotid stenosis. Pt was recommended to take Meclizine PRN for dizziness/vertigo but he hasn't been. In addition, pt admits to episodes of mild, non-radiating, non-pleuritic, and non-exertional, substernal chest pain, which comes and goes without intervention. Pt cannot identify any provoking factors. Pt denies fever, chills, recent travel, visual deficits, shortness of breath, orthopnea, palpitations, abdominal pain, N/V/D/C, hematochezia, melena, dysuria, hematuria, LOC, syncope, incontinence, peripheral edema. Upon arrival to ED, EKG: NSR at 70 bpm with IRBBB. CE x1: Trop 10. CXR: Clear lungs. No pleural effusions or pneumothorax. Cardiac and mediastinal silhouettes within normal limits. Trachea midline. Spinal degenerative change again noted.      Problem/Recommendations 1: syncope of unclear etiology  vasovagal syncope   neck pain reportedly triggers his syncope.   ct cspine   check orthostatics  can consider muscle relaxants tizanidine 2mg tid   Problem/Recommendations 2:       ___________________________  Will follow with you.  Thank you,  Aster Dash MD  Diplomate of the American Board of Neurology and Psychiatry.  Diplomate of the American Board of Vascular Neurology.   [facility]   Ph: [phone]    Differential diagnosis and plan of care discussed with patient after the evaluation.   Advanced care planning options discussed.   Pain assessed and judicious use of narcotics when appropriate was discussed.  Importance of Fall prevention discussed.  Counseling on Smoking and Alcohol cessation was offered when appropriate.  Counseling on Diet, exercise, and medication compliance was done.     62 minutes spent on the total encounter;  more than 50 % of the visit was spent on counseling  and or coordinating care by the attending physician.    Thank you for allowing me to participate in the care of this uday patient. Please do not hesitate to call me if you have any questions.     This and subsequent notes were partially created using voice recognition software and will  inherently be subject to errors including those of syntax and sound alike substitutions which may escape proofreading. In such instances original meaning may be extrapolated by contextual derivation.

## 2019-04-02 NOTE — ED PROVIDER NOTE - CLINICAL SUMMARY MEDICAL DECISION MAKING FREE TEXT BOX
69yM with h/o HTN, cardiac stent (plavix), BPH  presents with headache of one day duration and syncope without head trauma 2 hours prior to presentation.  Plan: EKG, Labs, trop, CXR, Fluids, reassess

## 2019-04-02 NOTE — ED ADULT NURSE NOTE - OBJECTIVE STATEMENT
pt received to 29, aox3.  pt c/o chest pressure and syncopal episode this am with dizziness weakness and lightheadedness prior to episode.  SL placed, labs sent, v/s as noted, pt on tele monitor, MD at bedside.  awaiting further orders will monitor

## 2019-04-02 NOTE — H&P ADULT - NEGATIVE CARDIOVASCULAR SYMPTOMS
no paroxysmal nocturnal dyspnea/no orthopnea/no peripheral edema/no claudication/no dyspnea on exertion/no palpitations

## 2019-04-02 NOTE — H&P ADULT - NEGATIVE NEUROLOGICAL SYMPTOMS
no transient paralysis/no paresthesias/no generalized seizures/no loss of sensation/no difficulty walking/no confusion/no weakness/no focal seizures/no syncope/no loss of consciousness/no hemiparesis/no tremors/no facial palsy

## 2019-04-02 NOTE — H&P ADULT - NEUROLOGICAL DETAILS
responds to pain/sensation intact/responds to verbal commands/cranial nerves intact/normal strength/alert and oriented x 3

## 2019-04-02 NOTE — H&P ADULT - HISTORY OF PRESENT ILLNESS
70 y/o male with a PMHx of CAD S/P stent placement to LAD, HTN, HLD, BPH and cholelithiasis presents to ED with headache and dizziness this morning. Pt woke up in his usual state of health this morning. After having some breakfast, pt developed mild neck pain which radiates to the head. Pt cannot identify any provoking factors for his symptoms. Pt admits that his headache feels like a dull pain, which is associated with feeling weak, fatigued and diaphoretic. Pt felt unsteady on his feet, as if he was going to pass out. Pt then sat down to prevent himself from falling or passing out. Pt denies LOC or head trauma. Pt admits that his symptoms resolved within one hour without intervention. Pt had similar complaints in November 2018 and underwent MRI head which showed carotid stenosis. Pt was recommended to take Meclizine PRN for dizziness/vertigo but he hasn't been. In addition, pt admits to episodes of mild, non-radiating, non-pleuritic, and non-exertional, substernal chest pain, which comes and goes without intervention. Pt cannot identify any provoking factors. Pt denies fever, chills, recent travel, visual deficits, shortness of breath, orthopnea, palpitations, abdominal pain, N/V/D/C, hematochezia, melena, dysuria, hematuria, LOC, syncope, incontinence, peripheral edema. Upon arrival to ED, EKG: NSR at 70 bpm with IRBBB. CE x1: Trop 10. CXR: Clear lungs. No pleural effusions or pneumothorax. Cardiac and mediastinal silhouettes within normal limits. Trachea midline. Spinal degenerative change again noted.

## 2019-04-02 NOTE — ED PROVIDER NOTE - PROGRESS NOTE DETAILS
Dr. Sami Colon has been contacted and wants patient to be admitted.  ~Lj Petit Dr. Sami Colon has been contacted and wants patient to be admitted. Patient currently denying any medication for headache  ~Lj Petit

## 2019-04-02 NOTE — ED PROVIDER NOTE - OBJECTIVE STATEMENT
69yM with h/o HTN, cardiac stent (plavix), BPH  presents with headache of one day duration and syncope without head trauma 2 hours prior to presentation. Patient endorse feeling weak, neck discomfort, diaphoresis. Was evaluated 4 months prior with similar presentation. 69yM with h/o HTN, cardiac stent (plavix), BPH  presents with headache of one day duration and syncope without head trauma 2 hours prior to presentation. Patient endorse feeling weak, neck discomfort, diaphoresis. Was evaluated 4 months prior with similar presentation in which an MRI/MRA was inconclusive due to patient not wanting to complete exam. Patient endorse chest pressure, neck discomfort, light headed with movement but denies, nausea, sob, abd pain, back pain

## 2019-04-02 NOTE — H&P ADULT - NEGATIVE GASTROINTESTINAL SYMPTOMS
no vomiting/no abdominal pain/no melena/no nausea/no change in bowel habits/no flatulence/no hematochezia/no constipation/no diarrhea

## 2019-04-02 NOTE — H&P ADULT - PROBLEM SELECTOR PLAN 4
Pt with history of carotid stenosis  Continue ASA, Plavix and Lipitor  CT angio head/neck pending  DASH diet

## 2019-04-02 NOTE — ED PROVIDER NOTE - PHYSICAL EXAMINATION
Gen: AAOx3, non-toxic  Head: NCAT  HEENT: EOMI, oral mucosa moist, normal conjunctiva, no nunchal rigidity, midline tenderness  Lung: CTAB, no respiratory distress, speaking in full sentences  CV: RRR, no murmurs, rubs or gallops  Abd: soft, NTND, no guarding  MSK: no visible deformities  Neuro: No focal sensory or motor deficits, CN2-12 intact             Steady gait  Psych: normal affect.   ~Lj Petit M.D. Resident

## 2019-04-03 PROBLEM — K80.20 CALCULUS OF GALLBLADDER WITHOUT CHOLECYSTITIS WITHOUT OBSTRUCTION: Chronic | Status: ACTIVE | Noted: 2017-11-06

## 2019-04-03 PROBLEM — I10 ESSENTIAL (PRIMARY) HYPERTENSION: Chronic | Status: INACTIVE | Noted: 2017-11-06 | Resolved: 2019-04-02

## 2019-04-03 PROBLEM — E78.00 PURE HYPERCHOLESTEROLEMIA, UNSPECIFIED: Chronic | Status: INACTIVE | Noted: 2017-11-06 | Resolved: 2019-04-02

## 2019-04-03 LAB
ALBUMIN SERPL ELPH-MCNC: 4.1 G/DL — SIGNIFICANT CHANGE UP (ref 3.3–5)
ALP SERPL-CCNC: 56 U/L — SIGNIFICANT CHANGE UP (ref 40–120)
ALT FLD-CCNC: 25 U/L — SIGNIFICANT CHANGE UP (ref 4–41)
ANION GAP SERPL CALC-SCNC: 12 MMO/L — SIGNIFICANT CHANGE UP (ref 7–14)
AST SERPL-CCNC: 23 U/L — SIGNIFICANT CHANGE UP (ref 4–40)
BILIRUB SERPL-MCNC: 0.6 MG/DL — SIGNIFICANT CHANGE UP (ref 0.2–1.2)
BUN SERPL-MCNC: 19 MG/DL — SIGNIFICANT CHANGE UP (ref 7–23)
CALCIUM SERPL-MCNC: 8.8 MG/DL — SIGNIFICANT CHANGE UP (ref 8.4–10.5)
CHLORIDE SERPL-SCNC: 104 MMOL/L — SIGNIFICANT CHANGE UP (ref 98–107)
CHOLEST SERPL-MCNC: 125 MG/DL — SIGNIFICANT CHANGE UP (ref 120–199)
CO2 SERPL-SCNC: 25 MMOL/L — SIGNIFICANT CHANGE UP (ref 22–31)
CREAT SERPL-MCNC: 0.97 MG/DL — SIGNIFICANT CHANGE UP (ref 0.5–1.3)
GLUCOSE SERPL-MCNC: 107 MG/DL — HIGH (ref 70–99)
HBA1C BLD-MCNC: 5.7 % — HIGH (ref 4–5.6)
HCT VFR BLD CALC: 46.3 % — SIGNIFICANT CHANGE UP (ref 39–50)
HDLC SERPL-MCNC: 42 MG/DL — SIGNIFICANT CHANGE UP (ref 35–55)
HGB BLD-MCNC: 15.1 G/DL — SIGNIFICANT CHANGE UP (ref 13–17)
LIPID PNL WITH DIRECT LDL SERPL: 87 MG/DL — SIGNIFICANT CHANGE UP
MAGNESIUM SERPL-MCNC: 2.2 MG/DL — SIGNIFICANT CHANGE UP (ref 1.6–2.6)
MCHC RBC-ENTMCNC: 31.3 PG — SIGNIFICANT CHANGE UP (ref 27–34)
MCHC RBC-ENTMCNC: 32.6 % — SIGNIFICANT CHANGE UP (ref 32–36)
MCV RBC AUTO: 96.1 FL — SIGNIFICANT CHANGE UP (ref 80–100)
NRBC # FLD: 0 K/UL — SIGNIFICANT CHANGE UP (ref 0–0)
PHOSPHATE SERPL-MCNC: 3.1 MG/DL — SIGNIFICANT CHANGE UP (ref 2.5–4.5)
PLATELET # BLD AUTO: 168 K/UL — SIGNIFICANT CHANGE UP (ref 150–400)
PMV BLD: 10.8 FL — SIGNIFICANT CHANGE UP (ref 7–13)
POTASSIUM SERPL-MCNC: 3.7 MMOL/L — SIGNIFICANT CHANGE UP (ref 3.5–5.3)
POTASSIUM SERPL-SCNC: 3.7 MMOL/L — SIGNIFICANT CHANGE UP (ref 3.5–5.3)
PROT SERPL-MCNC: 6.7 G/DL — SIGNIFICANT CHANGE UP (ref 6–8.3)
RBC # BLD: 4.82 M/UL — SIGNIFICANT CHANGE UP (ref 4.2–5.8)
RBC # FLD: 12.7 % — SIGNIFICANT CHANGE UP (ref 10.3–14.5)
SODIUM SERPL-SCNC: 141 MMOL/L — SIGNIFICANT CHANGE UP (ref 135–145)
TRIGL SERPL-MCNC: 53 MG/DL — SIGNIFICANT CHANGE UP (ref 10–149)
TSH SERPL-MCNC: 0.84 UIU/ML — SIGNIFICANT CHANGE UP (ref 0.27–4.2)
WBC # BLD: 4.5 K/UL — SIGNIFICANT CHANGE UP (ref 3.8–10.5)
WBC # FLD AUTO: 4.5 K/UL — SIGNIFICANT CHANGE UP (ref 3.8–10.5)

## 2019-04-03 PROCEDURE — 72125 CT NECK SPINE W/O DYE: CPT | Mod: 26

## 2019-04-03 PROCEDURE — 93306 TTE W/DOPPLER COMPLETE: CPT | Mod: 26

## 2019-04-03 RX ADMIN — CLOPIDOGREL BISULFATE 75 MILLIGRAM(S): 75 TABLET, FILM COATED ORAL at 11:23

## 2019-04-03 RX ADMIN — TAMSULOSIN HYDROCHLORIDE 0.4 MILLIGRAM(S): 0.4 CAPSULE ORAL at 21:31

## 2019-04-03 RX ADMIN — Medication 81 MILLIGRAM(S): at 11:23

## 2019-04-03 RX ADMIN — Medication 50 MILLIGRAM(S): at 06:11

## 2019-04-03 RX ADMIN — ATORVASTATIN CALCIUM 80 MILLIGRAM(S): 80 TABLET, FILM COATED ORAL at 21:31

## 2019-04-03 RX ADMIN — AMLODIPINE BESYLATE 2.5 MILLIGRAM(S): 2.5 TABLET ORAL at 06:10

## 2019-04-03 NOTE — PROGRESS NOTE ADULT - SUBJECTIVE AND OBJECTIVE BOX
CARDIOLOGY FOLLOW UP - Dr. Colon    CC no cp or sob   c.o neck pain, dizziness     PHYSICAL EXAM:  T(C): 36.7 (04-03-19 @ 09:26), Max: 36.7 (04-03-19 @ 09:26)  HR: 63 (04-03-19 @ 09:26) (61 - 71)  BP: 145/78 (04-03-19 @ 09:26) (124/67 - 175/74)  RR: 17 (04-03-19 @ 09:26) (17 - 18)  SpO2: 99% (04-03-19 @ 09:26) (98% - 100%)  Wt(kg): --  I&O's Summary      Appearance: Normal	  Cardiovascular: Normal S1 S2,RRR, No JVD, No murmurs  Respiratory: Lungs clear to auscultation	  Gastrointestinal:  Soft, Non-tender, + BS	  Extremities: Normal range of motion, No clubbing, cyanosis or edema        MEDICATIONS  (STANDING):  amLODIPine   Tablet 2.5 milliGRAM(s) Oral daily  aspirin enteric coated 81 milliGRAM(s) Oral daily  atorvastatin 80 milliGRAM(s) Oral at bedtime  clopidogrel Tablet 75 milliGRAM(s) Oral daily  enoxaparin Injectable 40 milliGRAM(s) SubCutaneous every 24 hours  lisinopril 40 milliGRAM(s) Oral daily  metoprolol succinate ER 50 milliGRAM(s) Oral daily  tamsulosin 0.4 milliGRAM(s) Oral at bedtime      TELEMETRY: NSR 	    ECG:  	  RADIOLOGY:   DIAGNOSTIC TESTING:  [ ] Echocardiogram:  [ ]  Catheterization:  [ ] Stress Test:    OTHER: 	  < from: CT Angio Neck w/ IV Cont (04.02.19 @ 13:16) >    EXAM:  CT ANGIO NECK (W)AW IC      EXAM:  CT ANGIO BRAIN (W)AW IC        PROCEDURE DATE:  Apr 2 2019         INTERPRETATION:  History: Neck pain.    Multiple axial sections were performed from base of skull to vertex for   contrast enhancement. The patient was injected with approximately 90 cc   of Omnipaque 350 IV. Multiple axial sections were performed through the   neck and Teller Donovan for purposes of CT angiogram. Coronal and sagittal   reconstructions were performed. 3-D reconstructionswere performed.    This exam is compared with prior MRI, MRA of the neck and Teller Donovan   performed on December 19, 2018.    The lateral ventricles have a normal configuration    Periventricular white matter lucencies seen which is likely related to   chronic microvascular ischemic changes    There is no evidence acute hemorrhage mass or mass effect in the   posterior fossa or supratentorial region.    Evaluation of the osseous structures with appropriate window appears   unremarkable.    Bilateral maxillary and ethmoid sinus mucosal thickening is seen.    Both mastoid and middle ear regions appear clear.    Both distal common carotid arteries appear normal.    There is evidence of a calcified and soft tissue plaque seen involving   the dorsal aspect of the proximal right internal carotid artery. There is   a 55% stenosis using the NASCET criteria. Evaluation of proximal right   external carotid artery appears normal.    Calcified plaque is seen involving the left carotid bifurcation region.   Evaluation of the proximal left internal and external carotid arteries   appear normal without significant stenosis.    Both vertebral arteries appear unremarkable    The dural venous sinuses demonstrate normal enhancement with no evidence   of venous sinus thrombosis.    Evaluation of the soft tissue neck region appears unremarkable    The visualized portions of both lung apices appear clear.    Impression: CTA of the neck demonstrates a 55% stenosis involving   proximal right internal carotid artery.    Unremarkable CTA of the Teller of Donovan.    < end of copied text >    LABS:	 	                                15.1   4.50  )-----------( 168      ( 03 Apr 2019 06:30 )             46.3     04-03    141  |  104  |  19  ----------------------------<  107<H>  3.7   |  25  |  0.97    Ca    8.8      03 Apr 2019 06:30  Phos  3.1     04-03  Mg     2.2     04-03    TPro  6.7  /  Alb  4.1  /  TBili  0.6  /  DBili  x   /  AST  23  /  ALT  25  /  AlkPhos  56  04-03

## 2019-04-03 NOTE — PROGRESS NOTE ADULT - ASSESSMENT
ECHO 12/18/2018: normal LV fx, EF 64%  mri/mra 12/19/18 evidence of mild intracranial atherosclerosis and R [place] stenosis of 50 %    a/p  70 y/o M with PMH of BPH, essential HTN, cholelithiasis, hypercholesterolemia, CAD S/p PCI to mid LAD (11/2017, [facility], [facility]) carotid disease and noncompliance with meds presents after an episode of near syncope, headache and chest heaviness     1. Near Syncope   vasovagal near syncope likely triggered by neck pain   orthostatic negative   meclizine prn   CTangio head/neck revealed no evidence of acute CVA,  + 55% stenosis involving the prox Right ICA (known from previous MRI results)  pending CT  cspine   check echo  neuro f/u     2. Carotid Disease  Continue asa, plavix, statin daily. (cva PPx)   ct angio head/ neck  revealed 55% stenosis involving the prox Right ICA    3. CAD, s/p pci, atypical chest pain  resolved, cv stable, no acs, HS trop x1 negative   cont asa/plavix/statin    4. HTN   bp stable   continue with ant-htn meds        dvt ppx ECHO 12/18/2018: normal LV fx, EF 64%  mri/mra 12/19/18 evidence of mild intracranial atherosclerosis and R ica stenosis of 50 %    a/p  70 y/o M with PMH of BPH, essential HTN, cholelithiasis, hypercholesterolemia, CAD S/p PCI to mid LAD (11/2017, [facility], [facility]) carotid disease and noncompliance with meds presents after an episode of near syncope, headache and chest heaviness     1. Near Syncope   vasovagal near syncope likely triggered by neck pain   orthostatic negative   meclizine prn   CTangio head/neck revealed no evidence of acute CVA,  + 55% stenosis involving the prox Right ICA (known from previous MRI results)  pending CT  cspine   f/u echo  neuro f/u     2. Carotid Disease  Continue asa, plavix, statin daily. (cva PPx)   ct angio head/ neck  revealed 55% stenosis involving the prox Right ICA    3. CAD, s/p pci, atypical chest pain  reports intermittent chest heaviness  no acs, HS trop negative   will check nuclear exercise stress test r/o new ischemic heart disease   cont asa/plavix/statin    4. HTN   bp stable   continue with ant-htn meds        dvt ppx

## 2019-04-03 NOTE — PROGRESS NOTE ADULT - SUBJECTIVE AND OBJECTIVE BOX
[facility]      Seen earlier today, and examined.  - Today, patient is without complaints.           *****MEDICATIONS: Current medication reviewed and documented.    MEDICATIONS  (STANDING):  amLODIPine   Tablet 2.5 milliGRAM(s) Oral daily  aspirin enteric coated 81 milliGRAM(s) Oral daily  atorvastatin 80 milliGRAM(s) Oral at bedtime  clopidogrel Tablet 75 milliGRAM(s) Oral daily  enoxaparin Injectable 40 milliGRAM(s) SubCutaneous every 24 hours  lisinopril 40 milliGRAM(s) Oral daily  metoprolol succinate ER 50 milliGRAM(s) Oral daily  tamsulosin 0.4 milliGRAM(s) Oral at bedtime    MEDICATIONS  (PRN):  meclizine 25 milliGRAM(s) Oral every 8 hours PRN Dizziness          ***** VITAL SIGNS:  T(F): 97.8 (04-04-19 @ 06:02), Max: 98 (04-03-19 @ 09:26)  HR: 60 (04-04-19 @ 06:02) (60 - 67)  BP: 138/80 (04-04-19 @ 06:02) (138/80 - 155/86)  RR: 17 (04-04-19 @ 06:02) (17 - 17)  SpO2: 98% (04-04-19 @ 06:02) (97% - 99%)  Wt(kg): --  ,   I&O's Summary           *****PHYSICAL EXAM:   alert oriented x 3 attention comprehension are fair.  Able to name, repeat.   EOmi fundi not visualized   no nystagmus VFF to confrontation  Tongue is midline  Palate elevates symmetrically   Moving all 4 ext spontaneously no drift appreciated    Gait not assessed.            *****LAB AND IMAGING:                        15.1   4.50  )-----------( 168      ( 03 Apr 2019 06:30 )             46.3               04-03    141  |  104  |  19  ----------------------------<  107<H>  3.7   |  25  |  0.97    Ca    8.8      03 Apr 2019 06:30  Phos  3.1     04-03  Mg     2.2     04-03    TPro  6.7  /  Alb  4.1  /  TBili  0.6  /  DBili  x   /  AST  23  /  ALT  25  /  AlkPhos  56  04-03                         [All pertinent recent Imaging/Reports reviewed]           *****A S S E S S M E N T   A N D   P L A N :  70 y/o male with a PMHx of CAD S/P stent placement to LAD, HTN, HLD, BPH and cholelithiasis presents to ED with headache and dizziness this morning. Pt woke up in his usual state of health this morning. After having some breakfast, pt developed mild neck pain which radiates to the head. Pt cannot identify any provoking factors for his symptoms. Pt admits that his headache feels like a dull pain, which is associated with feeling weak, fatigued and diaphoretic. Pt felt unsteady on his feet, as if he was going to pass out. Pt then sat down to prevent himself from falling or passing out. Pt denies LOC or head trauma. Pt admits that his symptoms resolved within one hour without intervention. Pt had similar complaints in November 2018 and underwent MRI head which showed carotid stenosis. Pt was recommended to take Meclizine PRN for dizziness/vertigo but he hasn't been. In addition, pt admits to episodes of mild, non-radiating, non-pleuritic, and non-exertional, substernal chest pain, which comes and goes without intervention. Pt cannot identify any provoking factors. Pt denies fever, chills, recent travel, visual deficits, shortness of breath, orthopnea, palpitations, abdominal pain, N/V/D/C, hematochezia, melena, dysuria, hematuria, LOC, syncope, incontinence, peripheral edema. Upon arrival to ED, EKG: NSR at 70 bpm with IRBBB. CE x1: Trop 10. CXR: Clear lungs. No pleural effusions or pneumothorax. Cardiac and mediastinal silhouettes within normal limits. Trachea midline. Spinal degenerative change again noted.      Problem/Recommendations 1: syncope of unclear etiology     no post ictal period, no tongue biting, no incontinence   the symptoms described as presyncope, likely triggered by neck pain      ct cspine no sig pathology     orthostatics neg             Thank you for allowing me to participate in the care of this patient. Please do not hesitate to call me if you have any  questions.        ________________  Aster Dash MD  [facility]  [phone]      30 minutes spent on total encounter; more than 50 % of the visit was  spent counseling about plan of care, compliance to diet/exercise and medication regimen and or  coordinating care by the attending physician.      It is advised that s stroke patients follow up with REYNA Carey @ [phone] in 1- 2 weeks.   Others please follow up with Dr. Michael Nissenbaum [phone]

## 2019-04-03 NOTE — PATIENT PROFILE ADULT - FUNCTIONAL SCREEN CURRENT LEVEL: EATING, MLM
Patient was not available for their therapy session at this time.  Reason not seen: Other health personnel with patient (06/21/18 1708).  Re-Attempt Plan: Will re-attempt per established treatment plan (06/21/18 1708).   0 = independent

## 2019-04-04 DIAGNOSIS — M54.2 CERVICALGIA: ICD-10-CM

## 2019-04-04 DIAGNOSIS — Z02.9 ENCOUNTER FOR ADMINISTRATIVE EXAMINATIONS, UNSPECIFIED: ICD-10-CM

## 2019-04-04 LAB
ANION GAP SERPL CALC-SCNC: 12 MMO/L — SIGNIFICANT CHANGE UP (ref 7–14)
BASOPHILS # BLD AUTO: 0.03 K/UL — SIGNIFICANT CHANGE UP (ref 0–0.2)
BASOPHILS NFR BLD AUTO: 0.6 % — SIGNIFICANT CHANGE UP (ref 0–2)
BUN SERPL-MCNC: 11 MG/DL — SIGNIFICANT CHANGE UP (ref 7–23)
CALCIUM SERPL-MCNC: 9.3 MG/DL — SIGNIFICANT CHANGE UP (ref 8.4–10.5)
CHLORIDE SERPL-SCNC: 102 MMOL/L — SIGNIFICANT CHANGE UP (ref 98–107)
CO2 SERPL-SCNC: 27 MMOL/L — SIGNIFICANT CHANGE UP (ref 22–31)
CREAT SERPL-MCNC: 0.93 MG/DL — SIGNIFICANT CHANGE UP (ref 0.5–1.3)
EOSINOPHIL # BLD AUTO: 0.26 K/UL — SIGNIFICANT CHANGE UP (ref 0–0.5)
EOSINOPHIL NFR BLD AUTO: 5.4 % — SIGNIFICANT CHANGE UP (ref 0–6)
GLUCOSE SERPL-MCNC: 105 MG/DL — HIGH (ref 70–99)
HCT VFR BLD CALC: 49.7 % — SIGNIFICANT CHANGE UP (ref 39–50)
HGB BLD-MCNC: 16.2 G/DL — SIGNIFICANT CHANGE UP (ref 13–17)
IMM GRANULOCYTES NFR BLD AUTO: 0.2 % — SIGNIFICANT CHANGE UP (ref 0–1.5)
LYMPHOCYTES # BLD AUTO: 1.4 K/UL — SIGNIFICANT CHANGE UP (ref 1–3.3)
LYMPHOCYTES # BLD AUTO: 29.1 % — SIGNIFICANT CHANGE UP (ref 13–44)
MAGNESIUM SERPL-MCNC: 2.1 MG/DL — SIGNIFICANT CHANGE UP (ref 1.6–2.6)
MCHC RBC-ENTMCNC: 30.4 PG — SIGNIFICANT CHANGE UP (ref 27–34)
MCHC RBC-ENTMCNC: 32.6 % — SIGNIFICANT CHANGE UP (ref 32–36)
MCV RBC AUTO: 93.2 FL — SIGNIFICANT CHANGE UP (ref 80–100)
MONOCYTES # BLD AUTO: 0.47 K/UL — SIGNIFICANT CHANGE UP (ref 0–0.9)
MONOCYTES NFR BLD AUTO: 9.8 % — SIGNIFICANT CHANGE UP (ref 2–14)
NEUTROPHILS # BLD AUTO: 2.64 K/UL — SIGNIFICANT CHANGE UP (ref 1.8–7.4)
NEUTROPHILS NFR BLD AUTO: 54.9 % — SIGNIFICANT CHANGE UP (ref 43–77)
NRBC # FLD: 0 K/UL — SIGNIFICANT CHANGE UP (ref 0–0)
PLATELET # BLD AUTO: 182 K/UL — SIGNIFICANT CHANGE UP (ref 150–400)
PMV BLD: 10.4 FL — SIGNIFICANT CHANGE UP (ref 7–13)
POTASSIUM SERPL-MCNC: 3.6 MMOL/L — SIGNIFICANT CHANGE UP (ref 3.5–5.3)
POTASSIUM SERPL-SCNC: 3.6 MMOL/L — SIGNIFICANT CHANGE UP (ref 3.5–5.3)
RBC # BLD: 5.33 M/UL — SIGNIFICANT CHANGE UP (ref 4.2–5.8)
RBC # FLD: 12.6 % — SIGNIFICANT CHANGE UP (ref 10.3–14.5)
SODIUM SERPL-SCNC: 141 MMOL/L — SIGNIFICANT CHANGE UP (ref 135–145)
WBC # BLD: 4.81 K/UL — SIGNIFICANT CHANGE UP (ref 3.8–10.5)
WBC # FLD AUTO: 4.81 K/UL — SIGNIFICANT CHANGE UP (ref 3.8–10.5)

## 2019-04-04 PROCEDURE — 93016 CV STRESS TEST SUPVJ ONLY: CPT | Mod: GC

## 2019-04-04 PROCEDURE — 78452 HT MUSCLE IMAGE SPECT MULT: CPT | Mod: 26

## 2019-04-04 PROCEDURE — 93018 CV STRESS TEST I&R ONLY: CPT | Mod: GC

## 2019-04-04 PROCEDURE — 72141 MRI NECK SPINE W/O DYE: CPT | Mod: 26

## 2019-04-04 RX ORDER — SODIUM CHLORIDE 9 MG/ML
3 INJECTION INTRAMUSCULAR; INTRAVENOUS; SUBCUTANEOUS EVERY 8 HOURS
Qty: 0 | Refills: 0 | Status: DISCONTINUED | OUTPATIENT
Start: 2019-04-04 | End: 2019-04-06

## 2019-04-04 RX ORDER — POTASSIUM CHLORIDE 20 MEQ
40 PACKET (EA) ORAL ONCE
Qty: 0 | Refills: 0 | Status: COMPLETED | OUTPATIENT
Start: 2019-04-04 | End: 2019-04-04

## 2019-04-04 RX ADMIN — SODIUM CHLORIDE 3 MILLILITER(S): 9 INJECTION INTRAMUSCULAR; INTRAVENOUS; SUBCUTANEOUS at 22:38

## 2019-04-04 RX ADMIN — TAMSULOSIN HYDROCHLORIDE 0.4 MILLIGRAM(S): 0.4 CAPSULE ORAL at 21:38

## 2019-04-04 RX ADMIN — AMLODIPINE BESYLATE 2.5 MILLIGRAM(S): 2.5 TABLET ORAL at 06:14

## 2019-04-04 RX ADMIN — ATORVASTATIN CALCIUM 80 MILLIGRAM(S): 80 TABLET, FILM COATED ORAL at 21:38

## 2019-04-04 RX ADMIN — CLOPIDOGREL BISULFATE 75 MILLIGRAM(S): 75 TABLET, FILM COATED ORAL at 13:18

## 2019-04-04 RX ADMIN — LISINOPRIL 40 MILLIGRAM(S): 2.5 TABLET ORAL at 06:14

## 2019-04-04 RX ADMIN — Medication 40 MILLIEQUIVALENT(S): at 13:18

## 2019-04-04 RX ADMIN — SODIUM CHLORIDE 3 MILLILITER(S): 9 INJECTION INTRAMUSCULAR; INTRAVENOUS; SUBCUTANEOUS at 10:13

## 2019-04-04 RX ADMIN — Medication 50 MILLIGRAM(S): at 06:14

## 2019-04-04 RX ADMIN — Medication 81 MILLIGRAM(S): at 13:18

## 2019-04-04 NOTE — CONSULT NOTE ADULT - SUBJECTIVE AND OBJECTIVE BOX
Patient is a 69y old  Male who presents with a chief complaint of Headache, dizziness (03 Apr 2019 14:41)      HPI:  68 y/o male with a PMHx of CAD S/P stent placement to LAD, HTN, HLD, BPH and cholelithiasis presents to ED with headache and dizziness this morning. Pt woke up in his usual state of health this morning. After having some breakfast, pt developed mild neck pain which radiates to the head. Pt cannot identify any provoking factors for his symptoms. Pt admits that his headache feels like a dull pain, which is associated with feeling weak, fatigued and diaphoretic. Pt felt unsteady on his feet, as if he was going to pass out. Pt then sat down to prevent himself from falling or passing out. Pt denies LOC or head trauma. Pt admits that his symptoms resolved within one hour without intervention. Pt had similar complaints in November 2018 and underwent MRI head which showed carotid stenosis. Pt was recommended to take Meclizine PRN for dizziness/vertigo but he hasn't been. In addition, pt admits to episodes of mild, non-radiating, non-pleuritic, and non-exertional, substernal chest pain, which comes and goes without intervention. Pt cannot identify any provoking factors. Pt denies fever, chills, recent travel, visual deficits, shortness of breath, orthopnea, palpitations, abdominal pain, N/V/D/C, hematochezia, melena, dysuria, hematuria, LOC, syncope, incontinence, peripheral edema. Upon arrival to ED, EKG: NSR at 70 bpm with IRBBB. CE x1: Trop 10. CXR: Clear lungs. No pleural effusions or pneumothorax. Cardiac and mediastinal silhouettes within normal limits. Trachea midline. Spinal degenerative change again noted. (02 Apr 2019 12:42)    This morning, pt denies any HA, CP, visual changes, SOB , palpitations, N/V, cough, abd pain, diarrhea.  He still has some residual neck discomfort but he is aware that he needs to sit on the edge of the bed for a few minutes before standing up.  His monitor showed nonsustained PVC couplets, plenty of artifact and yesterday at 5 PM, he did have transient sinus tachycardia -> not sure if he was attempting to transfer out of bed to chair or standing position at that time.      PAST MEDICAL & SURGICAL HISTORY:  Carotid artery stenosis  CAD (coronary artery disease): S/P coronary artery stent placement  HLD (hyperlipidemia)  HTN (hypertension)  Cholelithiasis  BPH (benign prostatic hyperplasia)  S/P coronary artery stent placement      FAMILY HISTORY:  No pertinent family history in first degree relatives      SOCIAL HISTORY:    Allergies    No Known Allergies    Intolerances        REVIEW OF SYSTEMS:    CONSTITUTIONAL: (+) dizziness (-) weakness (-) fevers (-) chills (-) weight loss (-) sweats (-) poor appetite (-) falls  HEENT: (-) visual changes (-) HA (-) vertigo (-) rhinorrhea (-) epistaxis (-) swelling (-) hearing changes (-) sore throat (-) hoarseness  NECK: (-) stiffness (+) pain  RESPIRATORY: (-) cough (-) SOB (-) LIN (-) hemoptysis  CARDIOVASCULAR: (-) chest pain (-) palpitations  GASTROINTESTINAL: (-) abd pain (-) nausea (-) vomiting (-) diarrhea (-) constipation (-) melena (-) BRBPR (-) dysphagia (-) odynophagia (-) incontinence (-) bloatedness  GENITOURINARY: (-) dysuria  (-) frequency (-) hematuria (-) incontinence (-) abnormal discharge (-) incomplete emptying  NEUROLOGICAL: (-) confusion (-) slurred speech (-) focal weakness (-) tingling/numbness (-) difficulty walking (-) tremors  MUSCULOSKELETAL: (-) back pain (-) joint pain (-) joint swelling (-) reduced ROM (-) extremity pain (-) extremity swelling  PSYCH: (-) anxious (-) depressed (-) aural hallucinations (-) visual hallucinations  SKIN: (-) itching (-) burning (-) rashes (-) swelling (-) bruising (-) pain  All other review of systems is negative unless indicated above.        Vital Signs Last 24 Hrs  T(C): 36.6 (04 Apr 2019 06:02), Max: 36.6 (04 Apr 2019 06:02)  T(F): 97.8 (04 Apr 2019 06:02), Max: 97.8 (04 Apr 2019 06:02)  HR: 60 (04 Apr 2019 06:02) (60 - 67)  BP: 138/80 (04 Apr 2019 06:02) (138/80 - 155/86)  BP(mean): --  RR: 17 (04 Apr 2019 06:02) (17 - 17)  SpO2: 98% (04 Apr 2019 06:02) (97% - 99%)  I&O's Summary      PHYSICAL EXAM:  GENERAL: NAD, well-developed, AAOx3  HEAD:  Atraumatic, Normocephalic  EYES: EOMI, PERRLA, conjunctiva and sclera clear  NECK: Supple, No JVD  CHEST/LUNG: Clear to auscultation bilaterally; No wheeze  HEART: Regular rate and rhythm; No murmurs, rubs, or gallops  ABDOMEN: Soft, Nontender, Nondistended; Bowel sounds present  EXTREMITIES:  2+ Peripheral Pulses, No clubbing, cyanosis, or edema  SKIN: No rashes or lesions  NEURO: nonfocal CN/motor/sensory/reflexes    LABS:                        16.2   4.81  )-----------( 182      ( 04 Apr 2019 06:53 )             49.7     04-04    141  |  102  |  11  ----------------------------<  105<H>  3.6   |  27  |  0.93    Ca    9.3      04 Apr 2019 06:53  Phos  3.1     04-03  Mg     2.1     04-04    TPro  6.7  /  Alb  4.1  /  TBili  0.6  /  DBili  x   /  AST  23  /  ALT  25  /  AlkPhos  56  04-03      CAPILLARY BLOOD GLUCOSE                RADIOLOGY & ADDITIONAL TESTS:    Imaging Personally Reviewed:  [x] YES  [ ] NO    Consultant(s) Notes Reviewed:  [x] YES  [ ] NO      MEDICATIONS  (STANDING):  amLODIPine   Tablet 2.5 milliGRAM(s) Oral daily  aspirin enteric coated 81 milliGRAM(s) Oral daily  atorvastatin 80 milliGRAM(s) Oral at bedtime  clopidogrel Tablet 75 milliGRAM(s) Oral daily  enoxaparin Injectable 40 milliGRAM(s) SubCutaneous every 24 hours  lisinopril 40 milliGRAM(s) Oral daily  metoprolol succinate ER 50 milliGRAM(s) Oral daily  potassium chloride    Tablet ER 40 milliEquivalent(s) Oral once  sodium chloride 0.9% lock flush 3 milliLiter(s) IV Push every 8 hours  tamsulosin 0.4 milliGRAM(s) Oral at bedtime    MEDICATIONS  (PRN):  meclizine 25 milliGRAM(s) Oral every 8 hours PRN Dizziness      Care Discussed with Consultants/Other Providers [x] YES  [ ] NO    HEALTH ISSUES - PROBLEM Dx:  Need for prophylactic measure: Need for prophylactic measure  BPH (benign prostatic hyperplasia): BPH (benign prostatic hyperplasia)  HLD (hyperlipidemia): HLD (hyperlipidemia)  HTN (hypertension): HTN (hypertension)  Carotid artery stenosis: Carotid artery stenosis  CAD (coronary artery disease): CAD (coronary artery disease)  Atypical chest pain: Atypical chest pain  Near syncope: Near syncope

## 2019-04-04 NOTE — CONSULT NOTE ADULT - ASSESSMENT
68 y/o male with a PMHx of CAD S/P stent placement to LAD, HTN, HLD, BPH and cholelithiasis presents to ED with headache and dizziness

## 2019-04-04 NOTE — PROGRESS NOTE ADULT - SUBJECTIVE AND OBJECTIVE BOX
[facility]      Seen earlier today, and examined.  - Today, patient is without complaints.           *****MEDICATIONS: Current medication reviewed and documented.    MEDICATIONS  (STANDING):  amLODIPine   Tablet 2.5 milliGRAM(s) Oral daily  aspirin enteric coated 81 milliGRAM(s) Oral daily  atorvastatin 80 milliGRAM(s) Oral at bedtime  clopidogrel Tablet 75 milliGRAM(s) Oral daily  enoxaparin Injectable 40 milliGRAM(s) SubCutaneous every 24 hours  lisinopril 40 milliGRAM(s) Oral daily  metoprolol succinate ER 50 milliGRAM(s) Oral daily  sodium chloride 0.9% lock flush 3 milliLiter(s) IV Push every 8 hours  tamsulosin 0.4 milliGRAM(s) Oral at bedtime    MEDICATIONS  (PRN):  meclizine 25 milliGRAM(s) Oral every 8 hours PRN Dizziness          ***** VITAL SIGNS:  T(F): 97.8 (19 @ 13:05), Max: 97.8 (19 @ 06:02)  HR: 70 (19 @ 13:05) (60 - 70)  BP: 111/64 (19 @ 13:05) (111/64 - 155/86)  RR: 17 (19 @ 13:05) (17 - 17)  SpO2: 70% (19 @ 13:05) (70% - 99%)  Wt(kg): --  ,   I&O's Summary           *****PHYSICAL EXAM:  alert oriented x 3 attention comprehension are fair.  Able to name, repeat.   EOmi fundi not visualized   no nystagmus VFF to confrontation  Tongue is midline  Palate elevates symmetrically   Moving all 4 ext spontaneously no drift appreciated           *****LAB AND IMAGIN.2   4.81  )-----------( 182      ( 2019 06:53 )             49.7               -    141  |  102  |  11  ----------------------------<  105<H>  3.6   |  27  |  0.93    Ca    9.3      2019 06:53  Phos  3.1     04-03  Mg     2.1     -04    TPro  6.7  /  Alb  4.1  /  TBili  0.6  /  DBili  x   /  AST  23  /  ALT  25  /  AlkPhos  56  -                     < from: MR Cervical Spine No Cont (19 @ 07:55) >  C2-C3: No canal or foraminal stenosis.    C3-C4: Disc herniation with mild bilateraluncovertebral joint and facet   hypertrophic change. Mild spinal canal and bilateral foraminal narrowing.   Disc osteophytic ridge with uncovertebral joint and facet hypertrophy.   Mild spinal canal, moderate right, mild to moderate left foraminal   narrowing.    C4-C5: Disc osteophytic ridge with uncovertebral joint and facet   hypertrophic change. Mild canal and bilateral foraminal narrowing.    C5-C6: No significant canal or foraminal stenosis.    C6-C7: Disc osseous ridge with uncovertebral joint and facet hypertrophic   change. Mild canal and bilateral foraminal narrowing.    C7-T1: No canal or foraminal stenosis.    There is no soft tissue neck mass or fluid collection.      < end of copied text >      [All pertinent recent Imaging/Reports reviewed]           *****A S S E S S M E N T   A N D   P L A N :  70 y/o male with a PMHx of CAD S/P stent placement to LAD, HTN, HLD, BPH and cholelithiasis presents to ED with headache and dizziness this morning. Pt woke up in his usual state of health this morning. After having some breakfast, pt developed mild neck pain which radiates to the head. Pt cannot identify any provoking factors for his symptoms. Pt admits that his headache feels like a dull pain, which is associated with feeling weak, fatigued and diaphoretic. Pt felt unsteady on his feet, as if he was going to pass out. Pt then sat down to prevent himself from falling or passing out. Pt denies LOC or head trauma. Pt admits that his symptoms resolved within one hour without intervention. Pt had similar complaints in 2018 and underwent MRI head which showed carotid stenosis. Pt was recommended to take Meclizine PRN for dizziness/vertigo but he hasn't been. In addition, pt admits to episodes of mild, non-radiating, non-pleuritic, and non-exertional, substernal chest pain, which comes and goes without intervention. Pt cannot identify any provoking factors. Pt denies fever, chills, recent travel, visual deficits, shortness of breath, orthopnea, palpitations, abdominal pain, N/V/D/C, hematochezia, melena, dysuria, hematuria, LOC, syncope, incontinence, peripheral edema. Upon arrival to ED, EKG: NSR at 70 bpm with IRBBB. CE x1: Trop 10. CXR: Clear lungs. No pleural effusions or pneumothorax. Cardiac and mediastinal silhouettes within normal limits. Trachea midline. Spinal degenerative change again noted.      Problem/Recommendations 1: syncope of unclear etiology     no post ictal period, no tongue biting, no incontinence   the symptoms described as presyncope, likely triggered by neck pain    ct cspine no sig pathology  mri c spine with mild spinal canal stenosis at c3-4, with foraminal stenosis R> Left  no intervention recommended.    orthostatics neg    d/c meclizine            Thank you for allowing me to participate in the care of this patient. Please do not hesitate to call me if you have any  questions.        ________________  Aster Dash MD  [facility]  [phone]      30 minutes spent on total encounter; more than 50 % of the visit was  spent counseling about plan of care, compliance to diet/exercise and medication regimen and or  coordinating care by the attending physician.      It is advised that s stroke patients follow up with REYNA Carey @ [phone] in 1- 2 weeks.   Others please follow up with Dr. Michael Nissenbaum [phone]

## 2019-04-04 NOTE — CONSULT NOTE ADULT - PROBLEM SELECTOR RECOMMENDATION 9
Likely vasovagal induced by his recurretn neck pain  No clearcut evidence for ACS  He does have an incidental 55% plaque on CTA head/neck involving the rt proximal ICA but unlikely to have been a factor in his near syncope; this was also demonstrated in the MRA from 2018  Tele so far does not demonstrate any significant sinus pauses or tachyarrhythmias, and none of his minor tele findings are correlating with reproducible symptoms anyways  Orthostatics are negative  TTE is unchanged  I reenforced the need for him to take a few minutes between sitting up in bed and transferring to sitting position to help avoid the possibility of recurrent near-syncope and vertigo  Could consider outpt stress but unlikely to be anginal equivalent at play here

## 2019-04-04 NOTE — PROGRESS NOTE ADULT - SUBJECTIVE AND OBJECTIVE BOX
CARDIOLOGY FOLLOW UP NOTE - DR. PATTERSON    Subjective:    no cp, sob    PHYSICAL EXAM:  T(C): 36.6 (04-04-19 @ 13:05), Max: 36.6 (04-04-19 @ 06:02)  HR: 70 (04-04-19 @ 13:05) (60 - 70)  BP: 111/64 (04-04-19 @ 13:05) (111/64 - 155/86)  RR: 17 (04-04-19 @ 13:05) (17 - 17)  SpO2: 70% (04-04-19 @ 13:05) (70% - 99%)  Wt(kg): --  I&O's Summary      Appearance: Normal	  Cardiovascular: Normal S1 S2,RRR, No JVD, No murmurs  Respiratory: Lungs clear to auscultation	  Gastrointestinal:  Soft, Non-tender, + BS	  Extremities: Normal range of motion, No clubbing, cyanosis or edema  Vascular: Peripheral pulses palpable 2+ bilaterally    MEDICATIONS  (STANDING):  amLODIPine   Tablet 2.5 milliGRAM(s) Oral daily  aspirin enteric coated 81 milliGRAM(s) Oral daily  atorvastatin 80 milliGRAM(s) Oral at bedtime  clopidogrel Tablet 75 milliGRAM(s) Oral daily  enoxaparin Injectable 40 milliGRAM(s) SubCutaneous every 24 hours  lisinopril 40 milliGRAM(s) Oral daily  metoprolol succinate ER 50 milliGRAM(s) Oral daily  sodium chloride 0.9% lock flush 3 milliLiter(s) IV Push every 8 hours  tamsulosin 0.4 milliGRAM(s) Oral at bedtime      TELEMETRY: 	    ECG:  	  RADIOLOGY:   DIAGNOSTIC TESTING:  [ ] Echocardiogram:  [ ] Catheterization:  [ ] Stress Test:    OTHER: 	    LABS:	 	    CARDIAC MARKERS:                                16.2   4.81  )-----------( 182      ( 04 Apr 2019 06:53 )             49.7     04-04    141  |  102  |  11  ----------------------------<  105<H>  3.6   |  27  |  0.93    Ca    9.3      04 Apr 2019 06:53  Phos  3.1     04-03  Mg     2.1     04-04    TPro  6.7  /  Alb  4.1  /  TBili  0.6  /  DBili  x   /  AST  23  /  ALT  25  /  AlkPhos  56  04-03    proBNP:     Lipid Profile:   HgA1c:

## 2019-04-05 ENCOUNTER — TRANSCRIPTION ENCOUNTER (OUTPATIENT)
Age: 69
End: 2019-04-05

## 2019-04-05 PROBLEM — I25.10 ATHEROSCLEROTIC HEART DISEASE OF NATIVE CORONARY ARTERY WITHOUT ANGINA PECTORIS: Chronic | Status: ACTIVE | Noted: 2019-04-02

## 2019-04-05 PROBLEM — I65.29 OCCLUSION AND STENOSIS OF UNSPECIFIED CAROTID ARTERY: Chronic | Status: ACTIVE | Noted: 2019-04-02

## 2019-04-05 PROBLEM — E78.5 HYPERLIPIDEMIA, UNSPECIFIED: Chronic | Status: ACTIVE | Noted: 2019-04-02

## 2019-04-05 LAB
ANION GAP SERPL CALC-SCNC: 10 MMO/L — SIGNIFICANT CHANGE UP (ref 7–14)
BASOPHILS # BLD AUTO: 0.04 K/UL — SIGNIFICANT CHANGE UP (ref 0–0.2)
BASOPHILS NFR BLD AUTO: 0.8 % — SIGNIFICANT CHANGE UP (ref 0–2)
BUN SERPL-MCNC: 13 MG/DL — SIGNIFICANT CHANGE UP (ref 7–23)
CALCIUM SERPL-MCNC: 8.7 MG/DL — SIGNIFICANT CHANGE UP (ref 8.4–10.5)
CHLORIDE SERPL-SCNC: 103 MMOL/L — SIGNIFICANT CHANGE UP (ref 98–107)
CO2 SERPL-SCNC: 27 MMOL/L — SIGNIFICANT CHANGE UP (ref 22–31)
CREAT SERPL-MCNC: 1.03 MG/DL — SIGNIFICANT CHANGE UP (ref 0.5–1.3)
EOSINOPHIL # BLD AUTO: 0.31 K/UL — SIGNIFICANT CHANGE UP (ref 0–0.5)
EOSINOPHIL NFR BLD AUTO: 6.6 % — HIGH (ref 0–6)
GLUCOSE SERPL-MCNC: 100 MG/DL — HIGH (ref 70–99)
HCT VFR BLD CALC: 44.3 % — SIGNIFICANT CHANGE UP (ref 39–50)
HCT VFR BLD CALC: 44.3 % — SIGNIFICANT CHANGE UP (ref 39–50)
HGB BLD-MCNC: 14.3 G/DL — SIGNIFICANT CHANGE UP (ref 13–17)
HGB BLD-MCNC: 14.3 G/DL — SIGNIFICANT CHANGE UP (ref 13–17)
IMM GRANULOCYTES NFR BLD AUTO: 0.2 % — SIGNIFICANT CHANGE UP (ref 0–1.5)
LYMPHOCYTES # BLD AUTO: 1.19 K/UL — SIGNIFICANT CHANGE UP (ref 1–3.3)
LYMPHOCYTES # BLD AUTO: 25.2 % — SIGNIFICANT CHANGE UP (ref 13–44)
MAGNESIUM SERPL-MCNC: 2.1 MG/DL — SIGNIFICANT CHANGE UP (ref 1.6–2.6)
MCHC RBC-ENTMCNC: 31 PG — SIGNIFICANT CHANGE UP (ref 27–34)
MCHC RBC-ENTMCNC: 31 PG — SIGNIFICANT CHANGE UP (ref 27–34)
MCHC RBC-ENTMCNC: 32.3 % — SIGNIFICANT CHANGE UP (ref 32–36)
MCHC RBC-ENTMCNC: 32.3 % — SIGNIFICANT CHANGE UP (ref 32–36)
MCV RBC AUTO: 96.1 FL — SIGNIFICANT CHANGE UP (ref 80–100)
MCV RBC AUTO: 96.1 FL — SIGNIFICANT CHANGE UP (ref 80–100)
MONOCYTES # BLD AUTO: 0.46 K/UL — SIGNIFICANT CHANGE UP (ref 0–0.9)
MONOCYTES NFR BLD AUTO: 9.7 % — SIGNIFICANT CHANGE UP (ref 2–14)
NEUTROPHILS # BLD AUTO: 2.71 K/UL — SIGNIFICANT CHANGE UP (ref 1.8–7.4)
NEUTROPHILS NFR BLD AUTO: 57.5 % — SIGNIFICANT CHANGE UP (ref 43–77)
NRBC # FLD: 0 K/UL — SIGNIFICANT CHANGE UP (ref 0–0)
NRBC # FLD: 0 K/UL — SIGNIFICANT CHANGE UP (ref 0–0)
PLATELET # BLD AUTO: 167 K/UL — SIGNIFICANT CHANGE UP (ref 150–400)
PLATELET # BLD AUTO: 167 K/UL — SIGNIFICANT CHANGE UP (ref 150–400)
PMV BLD: 10.5 FL — SIGNIFICANT CHANGE UP (ref 7–13)
PMV BLD: 10.5 FL — SIGNIFICANT CHANGE UP (ref 7–13)
POTASSIUM SERPL-MCNC: 3.8 MMOL/L — SIGNIFICANT CHANGE UP (ref 3.5–5.3)
POTASSIUM SERPL-SCNC: 3.8 MMOL/L — SIGNIFICANT CHANGE UP (ref 3.5–5.3)
RBC # BLD: 4.61 M/UL — SIGNIFICANT CHANGE UP (ref 4.2–5.8)
RBC # BLD: 4.61 M/UL — SIGNIFICANT CHANGE UP (ref 4.2–5.8)
RBC # FLD: 12.8 % — SIGNIFICANT CHANGE UP (ref 10.3–14.5)
RBC # FLD: 12.8 % — SIGNIFICANT CHANGE UP (ref 10.3–14.5)
SODIUM SERPL-SCNC: 140 MMOL/L — SIGNIFICANT CHANGE UP (ref 135–145)
WBC # BLD: 4.72 K/UL — SIGNIFICANT CHANGE UP (ref 3.8–10.5)
WBC # BLD: 4.72 K/UL — SIGNIFICANT CHANGE UP (ref 3.8–10.5)
WBC # FLD AUTO: 4.72 K/UL — SIGNIFICANT CHANGE UP (ref 3.8–10.5)
WBC # FLD AUTO: 4.72 K/UL — SIGNIFICANT CHANGE UP (ref 3.8–10.5)

## 2019-04-05 PROCEDURE — 99233 SBSQ HOSP IP/OBS HIGH 50: CPT

## 2019-04-05 PROCEDURE — 33285 INSJ SUBQ CAR RHYTHM MNTR: CPT

## 2019-04-05 RX ORDER — METOPROLOL TARTRATE 50 MG
1 TABLET ORAL
Qty: 0 | Refills: 0 | DISCHARGE
Start: 2019-04-05

## 2019-04-05 RX ORDER — AMLODIPINE BESYLATE 2.5 MG/1
1 TABLET ORAL
Qty: 0 | Refills: 0 | COMMUNITY

## 2019-04-05 RX ORDER — CLOPIDOGREL BISULFATE 75 MG/1
1 TABLET, FILM COATED ORAL
Qty: 0 | Refills: 0 | DISCHARGE
Start: 2019-04-05

## 2019-04-05 RX ORDER — TAMSULOSIN HYDROCHLORIDE 0.4 MG/1
1 CAPSULE ORAL
Qty: 0 | Refills: 0 | COMMUNITY

## 2019-04-05 RX ORDER — AMLODIPINE BESYLATE 2.5 MG/1
1 TABLET ORAL
Qty: 0 | Refills: 0 | DISCHARGE
Start: 2019-04-05

## 2019-04-05 RX ORDER — ATORVASTATIN CALCIUM 80 MG/1
1 TABLET, FILM COATED ORAL
Qty: 0 | Refills: 0 | DISCHARGE
Start: 2019-04-05

## 2019-04-05 RX ORDER — TAMSULOSIN HYDROCHLORIDE 0.4 MG/1
1 CAPSULE ORAL
Qty: 0 | Refills: 0 | DISCHARGE
Start: 2019-04-05

## 2019-04-05 RX ORDER — ASPIRIN/CALCIUM CARB/MAGNESIUM 324 MG
1 TABLET ORAL
Qty: 0 | Refills: 0 | DISCHARGE
Start: 2019-04-05

## 2019-04-05 RX ADMIN — TAMSULOSIN HYDROCHLORIDE 0.4 MILLIGRAM(S): 0.4 CAPSULE ORAL at 22:23

## 2019-04-05 RX ADMIN — Medication 50 MILLIGRAM(S): at 05:33

## 2019-04-05 RX ADMIN — SODIUM CHLORIDE 3 MILLILITER(S): 9 INJECTION INTRAMUSCULAR; INTRAVENOUS; SUBCUTANEOUS at 05:37

## 2019-04-05 RX ADMIN — SODIUM CHLORIDE 3 MILLILITER(S): 9 INJECTION INTRAMUSCULAR; INTRAVENOUS; SUBCUTANEOUS at 22:10

## 2019-04-05 RX ADMIN — Medication 81 MILLIGRAM(S): at 11:23

## 2019-04-05 RX ADMIN — AMLODIPINE BESYLATE 2.5 MILLIGRAM(S): 2.5 TABLET ORAL at 05:34

## 2019-04-05 RX ADMIN — CLOPIDOGREL BISULFATE 75 MILLIGRAM(S): 75 TABLET, FILM COATED ORAL at 11:23

## 2019-04-05 RX ADMIN — SODIUM CHLORIDE 3 MILLILITER(S): 9 INJECTION INTRAMUSCULAR; INTRAVENOUS; SUBCUTANEOUS at 10:39

## 2019-04-05 RX ADMIN — LISINOPRIL 40 MILLIGRAM(S): 2.5 TABLET ORAL at 05:33

## 2019-04-05 RX ADMIN — ATORVASTATIN CALCIUM 80 MILLIGRAM(S): 80 TABLET, FILM COATED ORAL at 22:23

## 2019-04-05 NOTE — PROGRESS NOTE ADULT - SUBJECTIVE AND OBJECTIVE BOX
CARDIOLOGY FOLLOW UP - Dr. Colon    CC no cp or sob   c/o intermittent dizziness       PHYSICAL EXAM:  T(C): 36.4 (04-05-19 @ 05:33), Max: 36.6 (04-04-19 @ 13:05)  HR: 68 (04-05-19 @ 05:33) (63 - 70)  BP: 132/82 (04-05-19 @ 05:33) (111/64 - 132/82)  RR: 17 (04-05-19 @ 05:33) (17 - 17)  SpO2: 99% (04-05-19 @ 05:33) (70% - 99%)  Wt(kg): --  I&O's Summary      Appearance: Normal	  Cardiovascular: Normal S1 S2,RRR, No JVD, No murmurs  Respiratory: Lungs clear to auscultation	  Gastrointestinal:  Soft, Non-tender, + BS	  Extremities: Normal range of motion, No clubbing, cyanosis or edema        MEDICATIONS  (STANDING):  amLODIPine   Tablet 2.5 milliGRAM(s) Oral daily  aspirin enteric coated 81 milliGRAM(s) Oral daily  atorvastatin 80 milliGRAM(s) Oral at bedtime  clopidogrel Tablet 75 milliGRAM(s) Oral daily  enoxaparin Injectable 40 milliGRAM(s) SubCutaneous every 24 hours  lisinopril 40 milliGRAM(s) Oral daily  metoprolol succinate ER 50 milliGRAM(s) Oral daily  sodium chloride 0.9% lock flush 3 milliLiter(s) IV Push every 8 hours  tamsulosin 0.4 milliGRAM(s) Oral at bedtime      TELEMETRY: NSR 	    ECG:  	  RADIOLOGY:   < from: MR Cervical Spine No Cont (04.04.19 @ 07:55) >      IMPRESSION:  At C3-C4 there is mild canal, moderate right and mild to moderate left   foraminal narrowing. Otherwise, there is mild canal and foraminal   narrowing at additional levels as described above.                < end of copied text >    DIAGNOSTIC TESTING:  [ ] Echocardiogram:  < from: Transthoracic Echocardiogram (04.03.19 @ 18:01) >  CONCLUSIONS:  1. Mitral annular calcification, otherwise normal mitral  valve. Minimal mitral regurgitation.  2. Normal left ventricular internal dimensions and wall  thicknesses.  3. Normal left ventricular systolic function. No segmental  wall motion abnormalities.  4. The right ventricle is not well visualized; grossly  normal right ventricular systolic function.  5. Estimated right ventricular systolic pressure equals 45  mm Hg, assuming right atrial pressure equals 10 mm Hg,  consistent with mild pulmonary hypertension.  *** Compared with echocardiogram of 12/18/2018, no  significant changes noted.  ------------------------------------------------------------------------  Confirmed on  4/4/2019 - 07:52:29 by Sami Delgado M.D.  ------------------------------------------------------------------------    < end of copied text >    [ ]  Catheterization:  [ ] Stress Test:    < from: Nuclear Stress Test-Pharmacologic (04.04.19 @ 09:53) >  GATED ANALYSIS:  Post-stress gated wallmotion analysis was performed (LVEF  = 58 %;LVEDV = 88 ml.), revealing normal LV function. RV  function appeared normal.  ------------------------------------------------------------------------  IMPRESSIONS:Normal Study  * Myocardial Perfusion SPECTresults are normal.  * Review of raw data shows: The study is of good technical  quality.  * The left ventricle was normal in size. Normal myocardial  perfusion scan,with no evidence of infarction or inducible  ischemia.  * Post-stress gated wall motion analysis was performed  (LVEF = 58 %;LVEDV = 88 ml.), revealing normal LV  function. RV function appeared normal.  ------------------------------------------------------------------------  Confirmed on  4/4/2019 - 16:52:58 by Arsen Lemon M.D.  ------------------------------------------------------------------------    < end of copied text >    OTHER: 	    LABS:	 	                                14.3   4.72  )-----------( 167      ( 05 Apr 2019 07:04 )             44.3     04-05    140  |  103  |  13  ----------------------------<  100<H>  3.8   |  27  |  1.03    Ca    8.7      05 Apr 2019 07:09  Mg     2.1     04-05

## 2019-04-05 NOTE — PROGRESS NOTE ADULT - SUBJECTIVE AND OBJECTIVE BOX
On tele, artifact while ambulating  No sustained AT or any sinus pauses      Vital Signs Last 24 Hrs  T(C): 36.4 (05 Apr 2019 05:33), Max: 36.6 (04 Apr 2019 13:05)  T(F): 97.6 (05 Apr 2019 05:33), Max: 97.8 (04 Apr 2019 13:05)  HR: 68 (05 Apr 2019 05:33) (63 - 70)  BP: 132/82 (05 Apr 2019 05:33) (111/64 - 132/82)  BP(mean): --  RR: 17 (05 Apr 2019 05:33) (17 - 17)  SpO2: 99% (05 Apr 2019 05:33) (70% - 99%)    GENERAL: NAD, well-developed, AAOx3  HEAD:  Atraumatic, Normocephalic  EYES: EOMI, PERRLA, conjunctiva and sclera clear  NECK: Supple, No JVD  CHEST/LUNG: Clear to auscultation bilaterally; No wheeze  HEART: Regular rate and rhythm; No murmurs, rubs, or gallops  ABDOMEN: Soft, Nontender, Nondistended; Bowel sounds present  EXTREMITIES:  2+ Peripheral Pulses, No clubbing, cyanosis, or edema  SKIN: No rashes or lesions  NEURO: nonfocal CN/motor/sensory/reflexes    LABS:                        14.3   4.72  )-----------( 167      ( 05 Apr 2019 07:04 )             44.3     04-05    140  |  103  |  13  ----------------------------<  100<H>  3.8   |  27  |  1.03    Ca    8.7      05 Apr 2019 07:09  Mg     2.1     04-05        CAPILLARY BLOOD GLUCOSE

## 2019-04-05 NOTE — DISCHARGE NOTE PROVIDER - PROVIDER TOKENS
PROVIDER:[TOKEN:[[id number]],FOLLOWUP:[1 week]] PROVIDER:[TOKEN:[[id number]],FOLLOWUP:[1 week]],PROVIDER:[[id number]],FOLLOWUP:[1 week]]

## 2019-04-05 NOTE — DISCHARGE NOTE PROVIDER - HOSPITAL COURSE
70 y/o male with a PMHx of CAD S/P stent placement to LAD, HTN, HLD, BPH and cholelithiasis presents to ED with headache, dizziness and near syncope.         Near syncope/headache- likely vasovagal due to neck pain CT angio head/neck shows unchanged carotid stenosis     Chest pain- atypical, enzymes negative, Nuclear stress negative on 4/5 , s/p loop recorder placement on 4/5

## 2019-04-05 NOTE — PROGRESS NOTE ADULT - ASSESSMENT
ECHO 12/18/2018: normal LV fx, EF 64%  mri/mra 12/19/18 evidence of mild intracranial atherosclerosis and R [place] stenosis of 50 %    a/p  70 y/o M with PMH of BPH, essential HTN, cholelithiasis, hypercholesterolemia, CAD S/p PCI to mid LAD (11/2017, [facility], [facility]) carotid disease and noncompliance with meds presents after an episode of near syncope, headache and chest heaviness     1. Near Syncope   vasovagal near syncope likely triggered by neck pain   orthostatic negative   CTangio head/neck revealed no evidence of acute CVA,  + 55% stenosis involving the prox Right ICA (known from previous MRI results)  CT spine noted   mri c spine with mild spinal canal stenosis at c3-4, with foraminal stenosis R> Left  no intervention recommended per Neuro   echo nl lv fxn  neuro f/u  given recurrent symptoms of dizziness and near syncopal episodes will plan for Loop today   2. Carotid Disease  Continue asa, plavix, statin daily. (cva PPx)   ct angio head/ neck  revealed 55% stenosis involving the prox Right ICA    3. CAD, s/p pci, atypical chest pain  stress normal   no acs, HS trop negative   cont asa/plavix/statin    4. HTN   bp stable   continue with ant-htn meds      dc planning after loop placement today     dvt ppx ECHO 12/18/2018: normal LV fx, EF 64%  mri/mra 12/19/18 evidence of mild intracranial atherosclerosis and R [place] stenosis of 50 %    a/p  68 y/o M with PMH of BPH, essential HTN, cholelithiasis, hypercholesterolemia, CAD S/p PCI to mid LAD (11/2017, [facility], [facility]) carotid disease and noncompliance with meds presents after an episode of near syncope, headache and chest heaviness     1. Near Syncope   vasovagal near syncope likely triggered by neck pain   orthostatic negative   CTangio head/neck revealed no evidence of acute CVA,  + 55% stenosis involving the prox Right ICA (known from previous MRI results)  CT spine noted   mri c spine with mild spinal canal stenosis at c3-4, with foraminal stenosis R> Left  no intervention recommended per Neuro   echo nl lv fxn  neuro f/u  given recurrent symptoms of dizziness and near syncopal episodes will plan for Loop today   2. Carotid Disease  Continue asa, plavix, statin daily. (cva PPx)   ct angio head/ neck  revealed 55% stenosis involving the prox Right ICA    3. CAD, s/p pci, atypical chest pain  stress normal   no acs, HS trop negative   cont asa/plavix/statin    4. HTN   bp stable   continue with ant-htn meds      dc planning after loop placement today   f.u for 4/25 at 2:45 pm     dvt ppx

## 2019-04-05 NOTE — DISCHARGE NOTE PROVIDER - NSDCCPCAREPLAN_GEN_ALL_CORE_FT
PRINCIPAL DISCHARGE DIAGNOSIS  Diagnosis: Syncope  Assessment and Plan of Treatment: stress test normal . s/p loop recorder placement on 4/5 . ledy enriquez with Dr. Colon in 1-2 weeks .

## 2019-04-05 NOTE — PROGRESS NOTE ADULT - ASSESSMENT
68 y/o male with a PMHx of CAD S/P stent placement to LAD, HTN, HLD, BPH and cholelithiasis presents to ED with headache and dizziness    Problem/Recommendation - 1:  Problem: Near syncope. Recommendation: Likely vasovagal induced by his recurretn neck pain  No clearcut evidence for ACS  He does have an incidental 55% plaque on CTA head/neck involving the rt proximal ICA but unlikely to have been a factor in his near syncope; this was also demonstrated in the MRA from 2018  Tele so far does not demonstrate any significant sinus pauses or tachyarrhythmias, and none of his minor tele findings are correlating with reproducible symptoms anyways  Orthostatics are negative  TTE is unchanged  Stress negative for ischemia  ?will speak with cards regarding indication for ILR    Problem/Recommendation - 2:  ·  Problem: CAD (coronary artery disease).  Recommendation: Cont DAPT, beta-blocker, statin.     Problem/Recommendation - 3:  ·  Problem: HTN (hypertension).  Recommendation: Cont toprol, amlodipine, lisinopril.     Problem/Recommendation - 4:  ·  Problem: HLD (hyperlipidemia).  Recommendation: Cont statin.     Problem/Recommendation - 5:  ·  Problem: Neck pain.  Recommendation: CT C-spine without acute fracture or dislocation  Tylenol prn.     Problem/Recommendation - 6:  Problem: BPH (benign prostatic hyperplasia). Recommendation: Cont flomax.    Problem/Recommendation - 7:  Problem: Discharge planning issues. Recommendation: Eventually to home, ?need for ILR?

## 2019-04-05 NOTE — CONSULT NOTE ADULT - SUBJECTIVE AND OBJECTIVE BOX
Date of Admission:    Patient is a 69y old  Male who presents with a chief complaint of Headache, dizziness (05 Apr 2019 11:18)      HISTORY OF PRESENT ILLNESS:     68 y/o male with a PMHx of CAD with prior PCI to LAD, HTN, HLD, BPH and cholelithiasis p/w headache and dizziness admitted for presyncopal evaluation EP consulted for ILR evaluation. On day of presentation, he noted neck pain radiating to his head with associated generalized weakness, diaphoresis,  and lightheadedness. He felt unsteady on his feet near passing out prompting him to sit down. He denies any LOC.     During this hospitalization, he underwent CTA Head and neck with 55% stenosis involving the prox R ICA (seen on prior MRI). He also had an MRI C spine demonstrating spinal canal stenosis at C3-C4 with foraminal stenosis R >L.. TTE demonstrated normal LV function.         Allergies: No Known Allergies      	    MEDICATIONS:  amLODIPine   Tablet 2.5 milliGRAM(s) Oral daily  aspirin enteric coated 81 milliGRAM(s) Oral daily  clopidogrel Tablet 75 milliGRAM(s) Oral daily  enoxaparin Injectable 40 milliGRAM(s) SubCutaneous every 24 hours  lisinopril 40 milliGRAM(s) Oral daily  metoprolol succinate ER 50 milliGRAM(s) Oral daily  tamsulosin 0.4 milliGRAM(s) Oral at bedtime  atorvastatin 80 milliGRAM(s) Oral at bedtime  sodium chloride 0.9% lock flush 3 milliLiter(s) IV Push every 8 hours      PAST MEDICAL & SURGICAL HISTORY:  Carotid artery stenosis  CAD (coronary artery disease): S/P coronary artery stent placement  HLD (hyperlipidemia)  HTN (hypertension)  Cholelithiasis  BPH (benign prostatic hyperplasia)  S/P coronary artery stent placement      FAMILY HISTORY:  No pertinent family history in first degree relatives      SOCIAL HISTORY:    [ ] Non-smoker    REVIEW OF SYSTEMS:    CONSTITUTIONAL: No fevers or chills  EYES/ENT: No visual changes;  No dysphagia  NECK: No pain or stiffness  RESPIRATORY: No cough, wheezing, hemoptysis; No shortness of breath  CARDIOVASCULAR: Nopalpitations; No lower extremity edema  GASTROINTESTINAL: No abdominal or epigastric pain. No nausea, vomiting, or hematemesis; No diarrhea or constipation. No melena or hematochezia.  BACK: No back pain  GENITOURINARY: No dysuria, frequency or hematuria  NEUROLOGICAL: No numbness or weakness  SKIN: No itching, burning, rashes, or lesions   All other review of systems is negative unless indicated above.      PHYSICAL EXAM:  T(C): 36.4 (04-05-19 @ 05:33), Max: 36.6 (04-04-19 @ 20:52)  HR: 68 (04-05-19 @ 05:33) (63 - 68)  BP: 132/82 (04-05-19 @ 05:33) (126/66 - 132/82)  RR: 17 (04-05-19 @ 05:33) (17 - 17)  SpO2: 99% (04-05-19 @ 05:33) (98% - 99%)  Wt(kg): --  I&O's Summary      Appearance: Normal	  HEENT:   Normal oral mucosa, PERRL, EOMI	  Lymphatic: No lymphadenopathy  Cardiovascular: Normal S1 S2, No JVD, No murmurs, No edema  Respiratory: Lungs clear to auscultation	  Psychiatry: A & O x 3, Mood & affect appropriate  Gastrointestinal:  Soft, Non-tender, + BS	  Skin: No rashes, No ecchymoses, No cyanosis	  Neurologic: Non-focal  Extremities: Normal range of motion, No clubbing, cyanosis or edema  Vascular: Peripheral pulses palpable 2+ bilaterally        LABS:	 	    CBC Full  -  ( 05 Apr 2019 07:04 )  WBC Count : 4.72 K/uL  Hemoglobin : 14.3 g/dL  Hematocrit : 44.3 %  Platelet Count - Automated : 167 K/uL  Mean Cell Volume : 96.1 fL  Mean Cell Hemoglobin : 31.0 pg  Mean Cell Hemoglobin Concentration : 32.3 %  Auto Neutrophil # : 2.71 K/uL  Auto Lymphocyte # : 1.19 K/uL  Auto Monocyte # : 0.46 K/uL  Auto Eosinophil # : 0.31 K/uL  Auto Basophil # : 0.04 K/uL  Auto Neutrophil % : 57.5 %  Auto Lymphocyte % : 25.2 %  Auto Monocyte % : 9.7 %  Auto Eosinophil % : 6.6 %  Auto Basophil % : 0.8 %    04-05    140  |  103  |  13  ----------------------------<  100<H>  3.8   |  27  |  1.03  04-04    141  |  102  |  11  ----------------------------<  105<H>  3.6   |  27  |  0.93    Ca    8.7      05 Apr 2019 07:09  Ca    9.3      04 Apr 2019 06:53  Mg     2.1     04-05  Mg     2.1     04-04    TELEMETRY: 	      ECG:  	    RADIOLOGY:    < from: Xray Chest 2 Views PA/Lat (04.02.19 @ 11:40) >  Clear lungs. No pleural effusions or pneumothorax.    PREVIOUS DIAGNOSTIC TESTING:    [ ] Echocardiogram: < from: Transthoracic Echocardiogram (04.03.19 @ 18:01) >  Dimensions:     Normal Values:  LA:     3.0 cm    2.0 - 4.0 cm  Ao:     3.2 cm    2.0 - 3.8 cm  SEPTUM: 0.8 cm    0.6 - 1.2 cm  PWT:    0.8 cm    0.6 - 1.1 cm  LVIDd:  4.5 cm    3.0 - 5.6 cm  LVIDs:  3.0 cm    1.8 - 4.0 cm  Derived Variables:  LVMI: 68 g/m2  RWT: 0.35  Fractional short: 33 %  Ejection Fraction (Teicholtz): 62 %    CONCLUSIONS:  1. Mitral annular calcification, otherwise normal mitral  valve. Minimal mitral regurgitation.  2. Normal left ventricular internal dimensions and wall  thicknesses.  3. Normal left ventricular systolic function. No segmental  wall motion abnormalities.  4. The right ventricle is not well visualized; grossly  normal right ventricular systolic function.  5. Estimated right ventricular systolic pressure equals 45  mm Hg, assuming right atrial pressure equals 10 mm Hg,  consistent with mild pulmonary hypertension.  *** Compared with echocardiogram of 12/18/2018, no  significant changes noted.  ------------------------------------------------------------------------  Confirmed on  4/4/2019 - 07:52:29 by Sami Delgado M.D.  ------------------------------------------------------------------------    < end of copied text >    [ ]  Catheterization:  [ ] Stress Test:  	  	  ASSESSMENT/PLAN: Date of Admission:    Patient is a 69y old  Male who presents with a chief complaint of Headache, dizziness (05 Apr 2019 11:18)      HISTORY OF PRESENT ILLNESS:     68 y/o male with a PMHx of CAD with prior PCI to LAD, HTN, HLD, BPH and cholelithiasis p/w headache and dizziness admitted for presyncopal evaluation EP consulted for ILR evaluation. On day of presentation, he noted neck pain radiating to his head with associated generalized weakness, diaphoresis,  and lightheadedness. He felt unsteady on his feet near passing out prompting him to sit down. He denies any LOC.     During this hospitalization, he underwent CTA Head and neck with 55% stenosis involving the prox R ICA (seen on prior MRI). He also had an MRI C spine demonstrating spinal canal stenosis at C3-C4 with foraminal stenosis R >L.. TTE demonstrated normal LV function.       Allergies: No Known Allergies      MEDICATIONS:  amLODIPine   Tablet 2.5 milliGRAM(s) Oral daily  aspirin enteric coated 81 milliGRAM(s) Oral daily  clopidogrel Tablet 75 milliGRAM(s) Oral daily  enoxaparin Injectable 40 milliGRAM(s) SubCutaneous every 24 hours  lisinopril 40 milliGRAM(s) Oral daily  metoprolol succinate ER 50 milliGRAM(s) Oral daily  tamsulosin 0.4 milliGRAM(s) Oral at bedtime  atorvastatin 80 milliGRAM(s) Oral at bedtime  sodium chloride 0.9% lock flush 3 milliLiter(s) IV Push every 8 hours      PAST MEDICAL & SURGICAL HISTORY:  Carotid artery stenosis  CAD (coronary artery disease): S/P coronary artery stent placement  HLD (hyperlipidemia)  HTN (hypertension)  Cholelithiasis  BPH (benign prostatic hyperplasia)  S/P coronary artery stent placement      FAMILY HISTORY:  No pertinent family history in first degree relatives      SOCIAL HISTORY:    [ ] Non-smoker    REVIEW OF SYSTEMS:    CONSTITUTIONAL: No fevers or chills  EYES/ENT: No visual changes;  No dysphagia  NECK: No pain or stiffness  RESPIRATORY: No cough, wheezing, hemoptysis; No shortness of breath  CARDIOVASCULAR: Nopalpitations; No lower extremity edema  GASTROINTESTINAL: No abdominal or epigastric pain. No nausea, vomiting, or hematemesis; No diarrhea or constipation. No melena or hematochezia.  BACK: No back pain  GENITOURINARY: No dysuria, frequency or hematuria  NEUROLOGICAL: No numbness or weakness  SKIN: No itching, burning, rashes, or lesions   All other review of systems is negative unless indicated above.      PHYSICAL EXAM:  T(C): 36.4 (04-05-19 @ 05:33), Max: 36.6 (04-04-19 @ 20:52)  HR: 68 (04-05-19 @ 05:33) (63 - 68)  BP: 132/82 (04-05-19 @ 05:33) (126/66 - 132/82)  RR: 17 (04-05-19 @ 05:33) (17 - 17)  SpO2: 99% (04-05-19 @ 05:33) (98% - 99%)  Wt(kg): --  I&O's Summary      Appearance: Normal	  HEENT:   Normal oral mucosa, PERRL, EOMI	  Lymphatic: No lymphadenopathy  Cardiovascular: Normal S1 S2, No JVD, No murmurs, No edema  Respiratory: Lungs clear to auscultation	  Psychiatry: A & O x 3, Mood & affect appropriate  Gastrointestinal:  Soft, Non-tender, + BS	  Skin: No rashes, No ecchymoses, No cyanosis	  Neurologic: Non-focal  Extremities: Normal range of motion, No clubbing, cyanosis or edema  Vascular: Peripheral pulses palpable 2+ bilaterally        LABS:	 	    CBC Full  -  ( 05 Apr 2019 07:04 )  WBC Count : 4.72 K/uL  Hemoglobin : 14.3 g/dL  Hematocrit : 44.3 %  Platelet Count - Automated : 167 K/uL  Mean Cell Volume : 96.1 fL  Mean Cell Hemoglobin : 31.0 pg  Mean Cell Hemoglobin Concentration : 32.3 %  Auto Neutrophil # : 2.71 K/uL  Auto Lymphocyte # : 1.19 K/uL  Auto Monocyte # : 0.46 K/uL  Auto Eosinophil # : 0.31 K/uL  Auto Basophil # : 0.04 K/uL  Auto Neutrophil % : 57.5 %  Auto Lymphocyte % : 25.2 %  Auto Monocyte % : 9.7 %  Auto Eosinophil % : 6.6 %  Auto Basophil % : 0.8 %    04-05    140  |  103  |  13  ----------------------------<  100<H>  3.8   |  27  |  1.03  04-04    141  |  102  |  11  ----------------------------<  105<H>  3.6   |  27  |  0.93    Ca    8.7      05 Apr 2019 07:09  Ca    9.3      04 Apr 2019 06:53  Mg     2.1     04-05  Mg     2.1     04-04    TELEMETRY: 	      ECG:  	    RADIOLOGY:    < from: Xray Chest 2 Views PA/Lat (04.02.19 @ 11:40) >  Clear lungs. No pleural effusions or pneumothorax.    PREVIOUS DIAGNOSTIC TESTING:    [ ] Echocardiogram: < from: Transthoracic Echocardiogram (04.03.19 @ 18:01) >  Dimensions:     Normal Values:  LA:     3.0 cm    2.0 - 4.0 cm  Ao:     3.2 cm    2.0 - 3.8 cm  SEPTUM: 0.8 cm    0.6 - 1.2 cm  PWT:    0.8 cm    0.6 - 1.1 cm  LVIDd:  4.5 cm    3.0 - 5.6 cm  LVIDs:  3.0 cm    1.8 - 4.0 cm  Derived Variables:  LVMI: 68 g/m2  RWT: 0.35  Fractional short: 33 %  Ejection Fraction (Teicholtz): 62 %    CONCLUSIONS:  1. Mitral annular calcification, otherwise normal mitral  valve. Minimal mitral regurgitation.  2. Normal left ventricular internal dimensions and wall  thicknesses.  3. Normal left ventricular systolic function. No segmental  wall motion abnormalities.  4. The right ventricle is not well visualized; grossly  normal right ventricular systolic function.  5. Estimated right ventricular systolic pressure equals 45  mm Hg, assuming right atrial pressure equals 10 mm Hg,  consistent with mild pulmonary hypertension.  *** Compared with echocardiogram of 12/18/2018, no  significant changes noted.  ------------------------------------------------------------------------  Confirmed on  4/4/2019 - 07:52:29 by Sami Delgado M.D.  ------------------------------------------------------------------------    [ ] Stress Test:  	< from: Nuclear Stress Test-Pharmacologic (04.04.19 @ 09:53) >  IMPRESSIONS:Normal Study  * Myocardial Perfusion SPECTresults are normal.  * Review of raw data shows: The study is of good technical  quality.  * The left ventricle was normal in size. Normal myocardial  perfusion scan,with no evidence of infarction or inducible  ischemia.  * Post-stress gated wall motion analysis was performed  (LVEF = 58 %;LVEDV = 88 ml.), revealing normal LV  function. RV function appeared normal.  ------------------------------------------------------------------------  Confirmed on  4/4/2019 - 16:52:58 by Arsen Lemon M.D.  ------------------------------------------------------------------------      ASSESSMENT/PLAN: 	    68 y/o male with a PMHx of CAD with prior PCI to LAD, HTN, HLD, BPH and cholelithiasis p/w headache and dizziness admitted for presyncopal evaluation EP consulted for ILR evaluation.     #Presyncope with hx of multiple syncopal episodes; Possibly 2/2 vasovagal vs. possible tachy/brittney syndrome Date of Admission:    Patient is a 69y old  Male who presents with a chief complaint of Headache, dizziness (05 Apr 2019 11:18)      HISTORY OF PRESENT ILLNESS:     68 y/o male with a PMHx of CAD with prior PCI to LAD, HTN, HLD, BPH and cholelithiasis p/w headache and dizziness admitted for presyncopal evaluation EP consulted for ILR evaluation. On day of presentation, he noted neck pain radiating to his head with associated generalized weakness, diaphoresis,  and lightheadedness. He felt unsteady on his feet near passing out prompting him to sit down. He denies any LOC.     During this hospitalization, he underwent CTA Head and neck with 55% stenosis involving the prox R ICA (seen on prior MRI). He also had an MRI C spine demonstrating spinal canal stenosis at C3-C4 with foraminal stenosis R >L.. TTE demonstrated normal LV function.       Allergies: No Known Allergies      MEDICATIONS:  amLODIPine   Tablet 2.5 milliGRAM(s) Oral daily  aspirin enteric coated 81 milliGRAM(s) Oral daily  clopidogrel Tablet 75 milliGRAM(s) Oral daily  enoxaparin Injectable 40 milliGRAM(s) SubCutaneous every 24 hours  lisinopril 40 milliGRAM(s) Oral daily  metoprolol succinate ER 50 milliGRAM(s) Oral daily  tamsulosin 0.4 milliGRAM(s) Oral at bedtime  atorvastatin 80 milliGRAM(s) Oral at bedtime  sodium chloride 0.9% lock flush 3 milliLiter(s) IV Push every 8 hours      PAST MEDICAL & SURGICAL HISTORY:  Carotid artery stenosis  CAD (coronary artery disease): S/P coronary artery stent placement  HLD (hyperlipidemia)  HTN (hypertension)  Cholelithiasis  BPH (benign prostatic hyperplasia)  S/P coronary artery stent placement      FAMILY HISTORY:  No pertinent family history in first degree relatives      SOCIAL HISTORY:    Former smoker; quit 5 years ago.     REVIEW OF SYSTEMS:    CONSTITUTIONAL: No fevers or chills  EYES/ENT: No visual changes;  No dysphagia  NECK: No pain or stiffness  RESPIRATORY: No cough, wheezing, hemoptysis; No shortness of breath  CARDIOVASCULAR: Nopalpitations; No lower extremity edema  GASTROINTESTINAL: No abdominal or epigastric pain. No nausea, vomiting, or hematemesis; No diarrhea or constipation. No melena or hematochezia.  BACK: No back pain  GENITOURINARY: No dysuria, frequency or hematuria  NEUROLOGICAL: No numbness or weakness  SKIN: No itching, burning, rashes, or lesions   All other review of systems is negative unless indicated above.      PHYSICAL EXAM:  T(C): 36.4 (04-05-19 @ 05:33), Max: 36.6 (04-04-19 @ 20:52)  HR: 68 (04-05-19 @ 05:33) (63 - 68)  BP: 132/82 (04-05-19 @ 05:33) (126/66 - 132/82)  RR: 17 (04-05-19 @ 05:33) (17 - 17)  SpO2: 99% (04-05-19 @ 05:33) (98% - 99%)  Wt(kg): --  I&O's Summary      Appearance: Normal	  HEENT:   Normal oral mucosa, PERRL, EOMI	  Lymphatic: No lymphadenopathy  Cardiovascular: Normal S1 S2, No JVD, No murmurs, No edema  Respiratory: Lungs clear to auscultation	  Psychiatry: A & O x 3, Mood & affect appropriate  Gastrointestinal:  Soft, Non-tender, + BS	  Skin: No rashes, No ecchymoses, No cyanosis	  Neurologic: Non-focal  Extremities: Normal range of motion, No clubbing, cyanosis or edema  Vascular: Peripheral pulses palpable 2+ bilaterally        LABS:	 	    CBC Full  -  ( 05 Apr 2019 07:04 )  WBC Count : 4.72 K/uL  Hemoglobin : 14.3 g/dL  Hematocrit : 44.3 %  Platelet Count - Automated : 167 K/uL  Mean Cell Volume : 96.1 fL  Mean Cell Hemoglobin : 31.0 pg  Mean Cell Hemoglobin Concentration : 32.3 %  Auto Neutrophil # : 2.71 K/uL  Auto Lymphocyte # : 1.19 K/uL  Auto Monocyte # : 0.46 K/uL  Auto Eosinophil # : 0.31 K/uL  Auto Basophil # : 0.04 K/uL  Auto Neutrophil % : 57.5 %  Auto Lymphocyte % : 25.2 %  Auto Monocyte % : 9.7 %  Auto Eosinophil % : 6.6 %  Auto Basophil % : 0.8 %    04-05    140  |  103  |  13  ----------------------------<  100<H>  3.8   |  27  |  1.03  04-04    141  |  102  |  11  ----------------------------<  105<H>  3.6   |  27  |  0.93    Ca    8.7      05 Apr 2019 07:09  Ca    9.3      04 Apr 2019 06:53  Mg     2.1     04-05  Mg     2.1     04-04    TELEMETRY: 	      ECG:  	    RADIOLOGY:    < from: Xray Chest 2 Views PA/Lat (04.02.19 @ 11:40) >  Clear lungs. No pleural effusions or pneumothorax.    PREVIOUS DIAGNOSTIC TESTING:    [ ] Echocardiogram: < from: Transthoracic Echocardiogram (04.03.19 @ 18:01) >  Dimensions:     Normal Values:  LA:     3.0 cm    2.0 - 4.0 cm  Ao:     3.2 cm    2.0 - 3.8 cm  SEPTUM: 0.8 cm    0.6 - 1.2 cm  PWT:    0.8 cm    0.6 - 1.1 cm  LVIDd:  4.5 cm    3.0 - 5.6 cm  LVIDs:  3.0 cm    1.8 - 4.0 cm  Derived Variables:  LVMI: 68 g/m2  RWT: 0.35  Fractional short: 33 %  Ejection Fraction (Teicholtz): 62 %    CONCLUSIONS:  1. Mitral annular calcification, otherwise normal mitral  valve. Minimal mitral regurgitation.  2. Normal left ventricular internal dimensions and wall  thicknesses.  3. Normal left ventricular systolic function. No segmental  wall motion abnormalities.  4. The right ventricle is not well visualized; grossly  normal right ventricular systolic function.  5. Estimated right ventricular systolic pressure equals 45  mm Hg, assuming right atrial pressure equals 10 mm Hg,  consistent with mild pulmonary hypertension.  *** Compared with echocardiogram of 12/18/2018, no  significant changes noted.  ------------------------------------------------------------------------  Confirmed on  4/4/2019 - 07:52:29 by Sami Delgado M.D.  ------------------------------------------------------------------------    [ ] Stress Test:  	< from: Nuclear Stress Test-Pharmacologic (04.04.19 @ 09:53) >  IMPRESSIONS:Normal Study  * Myocardial Perfusion SPECTresults are normal.  * Review of raw data shows: The study is of good technical  quality.  * The left ventricle was normal in size. Normal myocardial  perfusion scan,with no evidence of infarction or inducible  ischemia.  * Post-stress gated wall motion analysis was performed  (LVEF = 58 %;LVEDV = 88 ml.), revealing normal LV  function. RV function appeared normal.  ------------------------------------------------------------------------  Confirmed on  4/4/2019 - 16:52:58 by Arsen Lemon M.D.  ------------------------------------------------------------------------      ASSESSMENT/PLAN: 	    68 y/o male with a PMHx of CAD with prior PCI to LAD, HTN, HLD, BPH and cholelithiasis p/w headache and dizziness admitted for presyncopal evaluation EP consulted for ILR evaluation.     #Presyncope with hx of multiple syncopal episodes; Possibly 2/2 vasovagal vs. possible tachy/brittney syndrome Date of Admission:    Patient is a 69y old  Male who presents with a chief complaint of Headache, dizziness (05 Apr 2019 11:18)      HISTORY OF PRESENT ILLNESS:     70 y/o male with a PMHx of CAD with prior PCI to LAD, HTN, HLD, BPH and cholelithiasis p/w headache and dizziness admitted for presyncopal evaluation EP consulted for ILR evaluation. On day of presentation, he noted neck pain radiating to his head with associated generalized weakness, diaphoresis,  and lightheadedness. He felt unsteady on his feet near passing out prompting him to sit down. He denies any LOC.     During this hospitalization, he underwent CTA Head and neck with 55% stenosis involving the prox R ICA (seen on prior MRI). He also had an MRI C spine demonstrating spinal canal stenosis at C3-C4 with foraminal stenosis R >L.. TTE demonstrated normal LV function.       Allergies: No Known Allergies      MEDICATIONS:  amLODIPine   Tablet 2.5 milliGRAM(s) Oral daily  aspirin enteric coated 81 milliGRAM(s) Oral daily  clopidogrel Tablet 75 milliGRAM(s) Oral daily  enoxaparin Injectable 40 milliGRAM(s) SubCutaneous every 24 hours  lisinopril 40 milliGRAM(s) Oral daily  metoprolol succinate ER 50 milliGRAM(s) Oral daily  tamsulosin 0.4 milliGRAM(s) Oral at bedtime  atorvastatin 80 milliGRAM(s) Oral at bedtime  sodium chloride 0.9% lock flush 3 milliLiter(s) IV Push every 8 hours      PAST MEDICAL & SURGICAL HISTORY:  Carotid artery stenosis  CAD (coronary artery disease): S/P coronary artery stent placement  HLD (hyperlipidemia)  HTN (hypertension)  Cholelithiasis  BPH (benign prostatic hyperplasia)  S/P coronary artery stent placement      FAMILY HISTORY:  No pertinent family history in first degree relatives      SOCIAL HISTORY:    Former smoker; quit 5 years ago.     REVIEW OF SYSTEMS:    CONSTITUTIONAL: No fevers or chills  EYES/ENT: No visual changes;  No dysphagia  NECK: No pain or stiffness  RESPIRATORY: No cough, wheezing, hemoptysis; No shortness of breath  CARDIOVASCULAR: Nopalpitations; No lower extremity edema  GASTROINTESTINAL: No abdominal or epigastric pain. No nausea, vomiting, or hematemesis; No diarrhea or constipation. No melena or hematochezia.  BACK: No back pain  GENITOURINARY: No dysuria, frequency or hematuria  NEUROLOGICAL: No numbness or weakness  SKIN: No itching, burning, rashes, or lesions   All other review of systems is negative unless indicated above.      PHYSICAL EXAM:  T(C): 36.4 (04-05-19 @ 05:33), Max: 36.6 (04-04-19 @ 20:52)  HR: 68 (04-05-19 @ 05:33) (63 - 68)  BP: 132/82 (04-05-19 @ 05:33) (126/66 - 132/82)  RR: 17 (04-05-19 @ 05:33) (17 - 17)  SpO2: 99% (04-05-19 @ 05:33) (98% - 99%)  Wt(kg): --  I&O's Summary      Appearance: Normal	  HEENT:   Normal oral mucosa, PERRL, EOMI	  Lymphatic: No lymphadenopathy  Cardiovascular: Normal S1 S2, No JVD, No murmurs, No edema  Respiratory: Lungs clear to auscultation	  Psychiatry: A & O x 3, Mood & affect appropriate  Gastrointestinal:  Soft, Non-tender, + BS	  Skin: No rashes, No ecchymoses, No cyanosis	  Neurologic: Non-focal  Extremities: Normal range of motion, No clubbing, cyanosis or edema  Vascular: Peripheral pulses palpable 2+ bilaterally        LABS:	 	    CBC Full  -  ( 05 Apr 2019 07:04 )  WBC Count : 4.72 K/uL  Hemoglobin : 14.3 g/dL  Hematocrit : 44.3 %  Platelet Count - Automated : 167 K/uL  Mean Cell Volume : 96.1 fL  Mean Cell Hemoglobin : 31.0 pg  Mean Cell Hemoglobin Concentration : 32.3 %  Auto Neutrophil # : 2.71 K/uL  Auto Lymphocyte # : 1.19 K/uL  Auto Monocyte # : 0.46 K/uL  Auto Eosinophil # : 0.31 K/uL  Auto Basophil # : 0.04 K/uL  Auto Neutrophil % : 57.5 %  Auto Lymphocyte % : 25.2 %  Auto Monocyte % : 9.7 %  Auto Eosinophil % : 6.6 %  Auto Basophil % : 0.8 %    04-05    140  |  103  |  13  ----------------------------<  100<H>  3.8   |  27  |  1.03  04-04    141  |  102  |  11  ----------------------------<  105<H>  3.6   |  27  |  0.93    Ca    8.7      05 Apr 2019 07:09  Ca    9.3      04 Apr 2019 06:53  Mg     2.1     04-05  Mg     2.1     04-04    TELEMETRY: 	  SR 60-80s    RADIOLOGY:    < from: Xray Chest 2 Views PA/Lat (04.02.19 @ 11:40) >  Clear lungs. No pleural effusions or pneumothorax.    PREVIOUS DIAGNOSTIC TESTING:    [ ] Echocardiogram: < from: Transthoracic Echocardiogram (04.03.19 @ 18:01) >  Dimensions:     Normal Values:  LA:     3.0 cm    2.0 - 4.0 cm  Ao:     3.2 cm    2.0 - 3.8 cm  SEPTUM: 0.8 cm    0.6 - 1.2 cm  PWT:    0.8 cm    0.6 - 1.1 cm  LVIDd:  4.5 cm    3.0 - 5.6 cm  LVIDs:  3.0 cm    1.8 - 4.0 cm  Derived Variables:  LVMI: 68 g/m2  RWT: 0.35  Fractional short: 33 %  Ejection Fraction (Teicholtz): 62 %    CONCLUSIONS:  1. Mitral annular calcification, otherwise normal mitral  valve. Minimal mitral regurgitation.  2. Normal left ventricular internal dimensions and wall  thicknesses.  3. Normal left ventricular systolic function. No segmental  wall motion abnormalities.  4. The right ventricle is not well visualized; grossly  normal right ventricular systolic function.  5. Estimated right ventricular systolic pressure equals 45  mm Hg, assuming right atrial pressure equals 10 mm Hg,  consistent with mild pulmonary hypertension.  *** Compared with echocardiogram of 12/18/2018, no  significant changes noted.  ------------------------------------------------------------------------  Confirmed on  4/4/2019 - 07:52:29 by Sami Delgado M.D.  ------------------------------------------------------------------------    [ ] Stress Test:  	< from: Nuclear Stress Test-Pharmacologic (04.04.19 @ 09:53) >  IMPRESSIONS:Normal Study  * Myocardial Perfusion SPECTresults are normal.  * Review of raw data shows: The study is of good technical  quality.  * The left ventricle was normal in size. Normal myocardial  perfusion scan,with no evidence of infarction or inducible  ischemia.  * Post-stress gated wall motion analysis was performed  (LVEF = 58 %;LVEDV = 88 ml.), revealing normal LV  function. RV function appeared normal.  ------------------------------------------------------------------------  Confirmed on  4/4/2019 - 16:52:58 by Arsen Lemon M.D.  ------------------------------------------------------------------------      ASSESSMENT/PLAN: 	    70 y/o male with a PMHx of CAD with prior PCI to LAD, HTN, HLD, BPH and cholelithiasis p/w headache and dizziness admitted for presyncopal evaluation EP consulted for ILR evaluation.     #Presyncope with hx of multiple syncopal episodes; Possibly 2/2 vasovagal vs. possible tachy/brittney syndrome  - Consented for ILR with Dr. Castillo; (consent placed in chart)   - Telemetry     Case d/w Dr. Anna Spencer

## 2019-04-05 NOTE — DISCHARGE NOTE PROVIDER - CARE PROVIDER_API CALL
Sami Colon (MD)  Cardiovascular Disease; Interventional Cardiology; Nuclear Cardiology  [address], [address]  [place] [address]  Phone: [phone]  Fax: [phone]  Follow Up Time: 1 week Sami Colon)  Cardiovascular Disease; Interventional Cardiology; Nuclear Cardiology  [address] [facility] [address], [address]  [place] [address]  Phone: [phone]  Fax: [phone]  Follow Up Time: 1 week    Aster Dash)  Neurology; Vascular Neurology  [address] [place] [address]  Phone: [phone]  Fax: [phone]  Follow Up Time: 1 week

## 2019-04-06 ENCOUNTER — TRANSCRIPTION ENCOUNTER (OUTPATIENT)
Age: 69
End: 2019-04-06

## 2019-04-06 VITALS
SYSTOLIC BLOOD PRESSURE: 147 MMHG | DIASTOLIC BLOOD PRESSURE: 70 MMHG | OXYGEN SATURATION: 100 % | HEART RATE: 77 BPM | TEMPERATURE: 98 F | RESPIRATION RATE: 18 BRPM

## 2019-04-06 LAB
ANION GAP SERPL CALC-SCNC: 12 MMO/L — SIGNIFICANT CHANGE UP (ref 7–14)
BASOPHILS # BLD AUTO: 0.04 K/UL — SIGNIFICANT CHANGE UP (ref 0–0.2)
BASOPHILS NFR BLD AUTO: 0.7 % — SIGNIFICANT CHANGE UP (ref 0–2)
BUN SERPL-MCNC: 15 MG/DL — SIGNIFICANT CHANGE UP (ref 7–23)
CALCIUM SERPL-MCNC: 8.7 MG/DL — SIGNIFICANT CHANGE UP (ref 8.4–10.5)
CHLORIDE SERPL-SCNC: 102 MMOL/L — SIGNIFICANT CHANGE UP (ref 98–107)
CO2 SERPL-SCNC: 26 MMOL/L — SIGNIFICANT CHANGE UP (ref 22–31)
CREAT SERPL-MCNC: 1.04 MG/DL — SIGNIFICANT CHANGE UP (ref 0.5–1.3)
EOSINOPHIL # BLD AUTO: 0.25 K/UL — SIGNIFICANT CHANGE UP (ref 0–0.5)
EOSINOPHIL NFR BLD AUTO: 4.3 % — SIGNIFICANT CHANGE UP (ref 0–6)
GLUCOSE SERPL-MCNC: 151 MG/DL — HIGH (ref 70–99)
HCT VFR BLD CALC: 47.4 % — SIGNIFICANT CHANGE UP (ref 39–50)
HCT VFR BLD CALC: 47.4 % — SIGNIFICANT CHANGE UP (ref 39–50)
HGB BLD-MCNC: 15.7 G/DL — SIGNIFICANT CHANGE UP (ref 13–17)
HGB BLD-MCNC: 15.7 G/DL — SIGNIFICANT CHANGE UP (ref 13–17)
IMM GRANULOCYTES NFR BLD AUTO: 0.2 % — SIGNIFICANT CHANGE UP (ref 0–1.5)
LYMPHOCYTES # BLD AUTO: 1.41 K/UL — SIGNIFICANT CHANGE UP (ref 1–3.3)
LYMPHOCYTES # BLD AUTO: 24.5 % — SIGNIFICANT CHANGE UP (ref 13–44)
MAGNESIUM SERPL-MCNC: 2.1 MG/DL — SIGNIFICANT CHANGE UP (ref 1.6–2.6)
MCHC RBC-ENTMCNC: 31.2 PG — SIGNIFICANT CHANGE UP (ref 27–34)
MCHC RBC-ENTMCNC: 31.2 PG — SIGNIFICANT CHANGE UP (ref 27–34)
MCHC RBC-ENTMCNC: 33.1 % — SIGNIFICANT CHANGE UP (ref 32–36)
MCHC RBC-ENTMCNC: 33.1 % — SIGNIFICANT CHANGE UP (ref 32–36)
MCV RBC AUTO: 94.2 FL — SIGNIFICANT CHANGE UP (ref 80–100)
MCV RBC AUTO: 94.2 FL — SIGNIFICANT CHANGE UP (ref 80–100)
MONOCYTES # BLD AUTO: 0.46 K/UL — SIGNIFICANT CHANGE UP (ref 0–0.9)
MONOCYTES NFR BLD AUTO: 8 % — SIGNIFICANT CHANGE UP (ref 2–14)
NEUTROPHILS # BLD AUTO: 3.59 K/UL — SIGNIFICANT CHANGE UP (ref 1.8–7.4)
NEUTROPHILS NFR BLD AUTO: 62.3 % — SIGNIFICANT CHANGE UP (ref 43–77)
NRBC # FLD: 0 K/UL — SIGNIFICANT CHANGE UP (ref 0–0)
NRBC # FLD: 0 K/UL — SIGNIFICANT CHANGE UP (ref 0–0)
PLATELET # BLD AUTO: 181 K/UL — SIGNIFICANT CHANGE UP (ref 150–400)
PLATELET # BLD AUTO: 181 K/UL — SIGNIFICANT CHANGE UP (ref 150–400)
PMV BLD: 10.6 FL — SIGNIFICANT CHANGE UP (ref 7–13)
PMV BLD: 10.6 FL — SIGNIFICANT CHANGE UP (ref 7–13)
POTASSIUM SERPL-MCNC: 3.4 MMOL/L — LOW (ref 3.5–5.3)
POTASSIUM SERPL-SCNC: 3.4 MMOL/L — LOW (ref 3.5–5.3)
RBC # BLD: 5.03 M/UL — SIGNIFICANT CHANGE UP (ref 4.2–5.8)
RBC # BLD: 5.03 M/UL — SIGNIFICANT CHANGE UP (ref 4.2–5.8)
RBC # FLD: 12.7 % — SIGNIFICANT CHANGE UP (ref 10.3–14.5)
RBC # FLD: 12.7 % — SIGNIFICANT CHANGE UP (ref 10.3–14.5)
SODIUM SERPL-SCNC: 140 MMOL/L — SIGNIFICANT CHANGE UP (ref 135–145)
WBC # BLD: 5.76 K/UL — SIGNIFICANT CHANGE UP (ref 3.8–10.5)
WBC # BLD: 5.76 K/UL — SIGNIFICANT CHANGE UP (ref 3.8–10.5)
WBC # FLD AUTO: 5.76 K/UL — SIGNIFICANT CHANGE UP (ref 3.8–10.5)
WBC # FLD AUTO: 5.76 K/UL — SIGNIFICANT CHANGE UP (ref 3.8–10.5)

## 2019-04-06 RX ADMIN — AMLODIPINE BESYLATE 2.5 MILLIGRAM(S): 2.5 TABLET ORAL at 06:19

## 2019-04-06 RX ADMIN — SODIUM CHLORIDE 3 MILLILITER(S): 9 INJECTION INTRAMUSCULAR; INTRAVENOUS; SUBCUTANEOUS at 06:16

## 2019-04-06 RX ADMIN — CLOPIDOGREL BISULFATE 75 MILLIGRAM(S): 75 TABLET, FILM COATED ORAL at 11:36

## 2019-04-06 RX ADMIN — Medication 50 MILLIGRAM(S): at 06:19

## 2019-04-06 RX ADMIN — Medication 81 MILLIGRAM(S): at 11:36

## 2019-04-06 RX ADMIN — LISINOPRIL 40 MILLIGRAM(S): 2.5 TABLET ORAL at 06:19

## 2019-04-06 NOTE — PROGRESS NOTE ADULT - SUBJECTIVE AND OBJECTIVE BOX
CARDIOLOGY FOLLOW UP - Dr. Colon    CC no cp/sob       PHYSICAL EXAM:  T(C): 36.7 (04-06-19 @ 06:14), Max: 36.7 (04-05-19 @ 20:01)  HR: 63 (04-06-19 @ 06:15) (61 - 76)  BP: 128/69 (04-06-19 @ 06:15) (101/58 - 165/89)  RR: 18 (04-06-19 @ 06:14) (17 - 18)  SpO2: 100% (04-06-19 @ 06:14) (100% - 100%)  Wt(kg): --  I&O's Summary      Appearance: Normal	  Cardiovascular: Normal S1 S2,RRR, No JVD, No murmurs  Respiratory: Lungs clear to auscultation	  Gastrointestinal:  Soft, Non-tender, + BS	  Extremities: Normal range of motion, No clubbing, cyanosis or edema        MEDICATIONS  (STANDING):  amLODIPine   Tablet 2.5 milliGRAM(s) Oral daily  aspirin enteric coated 81 milliGRAM(s) Oral daily  atorvastatin 80 milliGRAM(s) Oral at bedtime  clopidogrel Tablet 75 milliGRAM(s) Oral daily  enoxaparin Injectable 40 milliGRAM(s) SubCutaneous every 24 hours  lisinopril 40 milliGRAM(s) Oral daily  metoprolol succinate ER 50 milliGRAM(s) Oral daily  sodium chloride 0.9% lock flush 3 milliLiter(s) IV Push every 8 hours  tamsulosin 0.4 milliGRAM(s) Oral at bedtime      TELEMETRY: 	    ECG:  	  RADIOLOGY:   DIAGNOSTIC TESTING:  [ ] Echocardiogram:  [ ]  Catheterization:  [ ] Stress Test:    OTHER: 	    LABS:	 	                                15.7   5.76  )-----------( 181      ( 06 Apr 2019 06:00 )             47.4     04-06    140  |  102  |  15  ----------------------------<  151<H>  3.4<L>   |  26  |  1.04    Ca    8.7      06 Apr 2019 06:00  Mg     2.1     04-06

## 2019-04-06 NOTE — DISCHARGE NOTE NURSING/CASE MANAGEMENT/SOCIAL WORK - NSDCDPATPORTLINK_GEN_ALL_CORE
You can access the [facility], offered by [facility], by registering with the following website: [email or web address]

## 2019-04-06 NOTE — PROGRESS NOTE ADULT - ASSESSMENT
70 y/o male with a PMHx of CAD S/P stent placement to LAD, HTN, HLD, BPH and cholelithiasis presents to ED with headache and dizziness    Problem/Recommendation - 1:  Problem: Near syncope. Recommendation: Likely vasovagal induced by his recurretn neck pain  No clearcut evidence for ACS  He does have an incidental 55% plaque on CTA head/neck involving the rt proximal ICA but unlikely to have been a factor in his near syncope; this was also demonstrated in the MRA from 2018  Tele so far does not demonstrate any significant sinus pauses or tachyarrhythmias, and none of his minor tele findings are correlating with reproducible symptoms anyways  Orthostatics are negative  TTE is unchanged  Stress negative for ischemia  S/p ILR 4/6/19    Problem/Recommendation - 2:  ·  Problem: CAD (coronary artery disease).  Recommendation: Cont DAPT, beta-blocker, statin.     Problem/Recommendation - 3:  ·  Problem: HTN (hypertension).  Recommendation: Cont toprol, amlodipine, lisinopril.     Problem/Recommendation - 4:  ·  Problem: HLD (hyperlipidemia).  Recommendation: Cont statin.     Problem/Recommendation - 5:  ·  Problem: Neck pain.  Recommendation: CT C-spine without acute fracture or dislocation  Tylenol prn.     Problem/Recommendation - 6:  Problem: BPH (benign prostatic hyperplasia). Recommendation: Cont flomax.    Problem/Recommendation - 7:  Problem: Discharge planning issues. Recommendation: stable for d/c to home today.  F/u with cardiology in 1-2 weeks

## 2019-04-06 NOTE — PROGRESS NOTE ADULT - ASSESSMENT
ECHO 12/18/2018: normal LV fx, EF 64%  mri/mra 12/19/18 evidence of mild intracranial atherosclerosis and R ica stenosis of 50 %    a/p  68 y/o M with PMH of BPH, essential HTN, cholelithiasis, hypercholesterolemia, CAD S/p PCI to mid LAD (11/2017, [facility], [facility]) carotid disease and noncompliance with meds presents after an episode of near syncope, headache and chest heaviness     1. Near Syncope   vasovagal near syncope likely triggered by neck pain   orthostatic negative   CTangio head/neck revealed no evidence of acute CVA,  + 55% stenosis involving the prox Right ICA (known from previous MRI results)  CT spine noted   mri c spine with mild spinal canal stenosis at c3-4, with foraminal stenosis R> Left  no intervention recommended per Neuro   echo nl lv fxn  neuro f/u  s/p loop     2. Carotid Disease  Continue asa, plavix, statin daily. (cva PPx)   ct angio head/ neck  revealed 55% stenosis involving the prox Right ICA    3. CAD, s/p pci, atypical chest pain  stress normal   no acs, HS trop negative   cont asa/plavix/statin    4. HTN   bp stable   continue with ant-htn meds    dc planning   f.u for 4/25 at 2:45 pm     dvt ppx

## 2019-04-06 NOTE — PROGRESS NOTE ADULT - REASON FOR ADMISSION
Headache, dizziness

## 2019-04-06 NOTE — PROGRESS NOTE ADULT - SUBJECTIVE AND OBJECTIVE BOX
Feels well  s/p ILR yesterday    Vital Signs Last 24 Hrs  T(C): 36.7 (06 Apr 2019 06:14), Max: 36.7 (05 Apr 2019 20:01)  T(F): 98 (06 Apr 2019 06:14), Max: 98 (05 Apr 2019 20:01)  HR: 63 (06 Apr 2019 06:15) (61 - 76)  BP: 128/69 (06 Apr 2019 06:15) (101/58 - 165/89)  BP(mean): --  RR: 18 (06 Apr 2019 06:14) (17 - 18)  SpO2: 100% (06 Apr 2019 06:14) (100% - 100%)    GENERAL: NAD, well-developed, AAOx3  HEAD:  Atraumatic, Normocephalic  EYES: EOMI, PERRLA, conjunctiva and sclera clear  NECK: Supple, No JVD  CHEST/LUNG: Clear to auscultation bilaterally; No wheeze  HEART: Regular rate and rhythm; No murmurs, rubs, or gallops  ABDOMEN: Soft, Nontender, Nondistended; Bowel sounds present  EXTREMITIES:  2+ Peripheral Pulses, No clubbing, cyanosis, or edema  SKIN: No rashes or lesions  NEURO: nonfocal CN/motor/sensory/reflexes    LABS:                        15.7   5.76  )-----------( 181      ( 06 Apr 2019 06:00 )             47.4     04-06    140  |  102  |  15  ----------------------------<  151<H>  3.4<L>   |  26  |  1.04    Ca    8.7      06 Apr 2019 06:00  Mg     2.1     04-06        CAPILLARY BLOOD GLUCOSE

## 2019-04-06 NOTE — PROGRESS NOTE ADULT - ATTENDING COMMENTS
Patient seen and examined.  Agree with above NP note.  cv stable  pt known to our practice, h/o cad s/p PCI, HTN admitted with cp, near syncope  near-syncope possible secondary to orthostatic bp changes  sbp now controlled   cont current meds  check echo   tele  head ct
Patient seen and examined.  Agree with above NP note.  recurrent syncope  neuro/cardiac w/u unremarkable   plan for ilr today in light of multiple concerning episodes of syncope with prodrome possible consistent with brittney/tachyarrhythmia  cont current cv meds    d/c home today post loop
agree with above NP note.  cv stable for d/.c   s/p ilr  cont current meds
Patient seen and examined, agree with the above assessment and plan by REYNA Nuñez.  Cv stable  still w dizziness and neck pain and chest pressure  awaiting CT neck per neuro  plan for stress  recheck orthostatics

## 2019-04-11 ENCOUNTER — MESSAGE (OUTPATIENT)
Age: 69
End: 2019-04-11

## 2019-04-22 ENCOUNTER — APPOINTMENT (OUTPATIENT)
Dept: ELECTROPHYSIOLOGY | Facility: CLINIC | Age: 69
End: 2019-04-22
Payer: MEDICARE

## 2019-04-22 PROCEDURE — 93285 PRGRMG DEV EVAL SCRMS IP: CPT

## 2019-04-22 RX ORDER — AMLODIPINE BESYLATE 2.5 MG/1
2.5 TABLET ORAL
Refills: 0 | Status: ACTIVE | COMMUNITY

## 2019-05-08 ENCOUNTER — APPOINTMENT (OUTPATIENT)
Dept: ELECTROPHYSIOLOGY | Facility: CLINIC | Age: 69
End: 2019-05-08

## 2019-07-05 ENCOUNTER — APPOINTMENT (OUTPATIENT)
Dept: ULTRASOUND IMAGING | Facility: IMAGING CENTER | Age: 69
End: 2019-07-05
Payer: MEDICARE

## 2019-07-05 ENCOUNTER — OUTPATIENT (OUTPATIENT)
Dept: OUTPATIENT SERVICES | Facility: HOSPITAL | Age: 69
LOS: 1 days | End: 2019-07-05
Payer: MEDICARE

## 2019-07-05 DIAGNOSIS — Z00.8 ENCOUNTER FOR OTHER GENERAL EXAMINATION: ICD-10-CM

## 2019-07-05 DIAGNOSIS — Z95.5 PRESENCE OF CORONARY ANGIOPLASTY IMPLANT AND GRAFT: Chronic | ICD-10-CM

## 2019-07-05 PROCEDURE — 76700 US EXAM ABDOM COMPLETE: CPT

## 2019-07-05 PROCEDURE — 76700 US EXAM ABDOM COMPLETE: CPT | Mod: 26

## 2019-09-11 ENCOUNTER — OUTPATIENT (OUTPATIENT)
Dept: OUTPATIENT SERVICES | Facility: HOSPITAL | Age: 69
LOS: 1 days | End: 2019-09-11

## 2019-09-11 VITALS
HEART RATE: 75 BPM | RESPIRATION RATE: 14 BRPM | WEIGHT: 132.06 LBS | TEMPERATURE: 98 F | DIASTOLIC BLOOD PRESSURE: 80 MMHG | OXYGEN SATURATION: 98 % | SYSTOLIC BLOOD PRESSURE: 150 MMHG | HEIGHT: 67 IN

## 2019-09-11 DIAGNOSIS — K81.1 CHRONIC CHOLECYSTITIS: ICD-10-CM

## 2019-09-11 DIAGNOSIS — I10 ESSENTIAL (PRIMARY) HYPERTENSION: ICD-10-CM

## 2019-09-11 DIAGNOSIS — I25.10 ATHEROSCLEROTIC HEART DISEASE OF NATIVE CORONARY ARTERY WITHOUT ANGINA PECTORIS: ICD-10-CM

## 2019-09-11 DIAGNOSIS — Z95.5 PRESENCE OF CORONARY ANGIOPLASTY IMPLANT AND GRAFT: Chronic | ICD-10-CM

## 2019-09-11 DIAGNOSIS — Z98.890 OTHER SPECIFIED POSTPROCEDURAL STATES: Chronic | ICD-10-CM

## 2019-09-11 DIAGNOSIS — R06.83 SNORING: ICD-10-CM

## 2019-09-11 LAB
ALBUMIN SERPL ELPH-MCNC: 4.4 G/DL — SIGNIFICANT CHANGE UP (ref 3.3–5)
ALP SERPL-CCNC: 57 U/L — SIGNIFICANT CHANGE UP (ref 40–120)
ALT FLD-CCNC: 24 U/L — SIGNIFICANT CHANGE UP (ref 4–41)
ANION GAP SERPL CALC-SCNC: 14 MMO/L — SIGNIFICANT CHANGE UP (ref 7–14)
AST SERPL-CCNC: 25 U/L — SIGNIFICANT CHANGE UP (ref 4–40)
BILIRUB SERPL-MCNC: 0.5 MG/DL — SIGNIFICANT CHANGE UP (ref 0.2–1.2)
BUN SERPL-MCNC: 14 MG/DL — SIGNIFICANT CHANGE UP (ref 7–23)
CALCIUM SERPL-MCNC: 9.2 MG/DL — SIGNIFICANT CHANGE UP (ref 8.4–10.5)
CHLORIDE SERPL-SCNC: 99 MMOL/L — SIGNIFICANT CHANGE UP (ref 98–107)
CO2 SERPL-SCNC: 28 MMOL/L — SIGNIFICANT CHANGE UP (ref 22–31)
CREAT SERPL-MCNC: 1.11 MG/DL — SIGNIFICANT CHANGE UP (ref 0.5–1.3)
GLUCOSE SERPL-MCNC: 106 MG/DL — HIGH (ref 70–99)
HCT VFR BLD CALC: 44.9 % — SIGNIFICANT CHANGE UP (ref 39–50)
HGB BLD-MCNC: 14.9 G/DL — SIGNIFICANT CHANGE UP (ref 13–17)
MCHC RBC-ENTMCNC: 30.9 PG — SIGNIFICANT CHANGE UP (ref 27–34)
MCHC RBC-ENTMCNC: 33.2 % — SIGNIFICANT CHANGE UP (ref 32–36)
MCV RBC AUTO: 93.2 FL — SIGNIFICANT CHANGE UP (ref 80–100)
NRBC # FLD: 0 K/UL — SIGNIFICANT CHANGE UP (ref 0–0)
PLATELET # BLD AUTO: 194 K/UL — SIGNIFICANT CHANGE UP (ref 150–400)
PMV BLD: 10 FL — SIGNIFICANT CHANGE UP (ref 7–13)
POTASSIUM SERPL-MCNC: 3.6 MMOL/L — SIGNIFICANT CHANGE UP (ref 3.5–5.3)
POTASSIUM SERPL-SCNC: 3.6 MMOL/L — SIGNIFICANT CHANGE UP (ref 3.5–5.3)
PROT SERPL-MCNC: 7.2 G/DL — SIGNIFICANT CHANGE UP (ref 6–8.3)
RBC # BLD: 4.82 M/UL — SIGNIFICANT CHANGE UP (ref 4.2–5.8)
RBC # FLD: 12.8 % — SIGNIFICANT CHANGE UP (ref 10.3–14.5)
SODIUM SERPL-SCNC: 141 MMOL/L — SIGNIFICANT CHANGE UP (ref 135–145)
WBC # BLD: 4.46 K/UL — SIGNIFICANT CHANGE UP (ref 3.8–10.5)
WBC # FLD AUTO: 4.46 K/UL — SIGNIFICANT CHANGE UP (ref 3.8–10.5)

## 2019-09-11 NOTE — H&P PST ADULT - NEGATIVE MUSCULOSKELETAL SYMPTOMS
no muscle weakness/no arthritis/no joint swelling/no myalgia/no muscle cramps/no leg pain L/no leg pain R/no stiffness/no arm pain L/no arm pain R

## 2019-09-11 NOTE — H&P PST ADULT - NEGATIVE ENMT SYMPTOMS
no post-nasal discharge/no nasal obstruction/no abnormal taste sensation/no gum bleeding/no sinus symptoms/no nasal discharge/no nose bleeds/no dry mouth/no vertigo/no nasal congestion/no throat pain/no dysphagia/no recurrent cold sores/no tinnitus/no ear pain

## 2019-09-11 NOTE — H&P PST ADULT - NSICDXPROBLEM_GEN_ALL_CORE_FT
PROBLEM DIAGNOSES  Problem: Chronic cholecystitis  Assessment and Plan: Patient is scheduled for Laparoscopic cholecystectomy scheduled on 9/19/2019  with Dr. Rios.  Preop instructions, pepcid, surgical scrub provided. Pt stated understanding. Teach back method utilized.   Pending medical evalaution per surgeon per patient obtain for PST.   Pending cardiology evalaution per surgeon and PST- history of CAD, s/p stent, loop recorder, awaiting written instructions.   Loop recorder - OR booking notified.   History of loop recorder [employer] : Serial Number [id number]  Model [id number]  Spoke to Dr. Wong- [id number]- Ok to have at [facility] with Loop recorder.   Case discussed with Dr. Serra.       Problem: Snoring  Assessment and Plan: NASRIN precautions, OR booking notified.      Problem: Hypertension  Assessment and Plan: Patient instructed to take Amlodopine, Metoprolol, and Ramipril in the AM of surgery with a sip of water.     Problem: CAD (coronary artery disease)  Assessment and Plan: Patient with a history of CAD on Asprin and Plavix.   Plavix plan per cardiology - Hold 5 days prior to surgery - awaiting written instructions.   Last dose of Plavix : 9/13/2019.  Asprin plan: Continue Asprin, pt with a history of Stent. PROBLEM DIAGNOSES  Problem: Chronic cholecystitis  Assessment and Plan: Patient is scheduled for Laparoscopic cholecystectomy scheduled on 9/19/2019  with Dr. Rios.  Preop instructions, pepcid, surgical scrub provided. Pt stated understanding. Teach back method utilized.   Pending medical evaluation per surgeon per patient obtain for PST.   Pending cardiology evaluation per surgeon and PST- history of CAD, s/p stent, loop recorder, awaiting written instructions for Plavix.   Loop recorder - OR booking notified.   History of loop recorder [employer] : Serial Number [id number]  Model 69609  Spoke to Dr. Wong- [phone]- Ok to have at [facility] with Loop recorder.   Case discussed with Dr. Serra.       Problem: Snoring  Assessment and Plan: NASRIN precautions, OR booking notified.      Problem: Hypertension  Assessment and Plan: Patient instructed to take Amlodipine, Metoprolol, and Ramipril in the AM of surgery with a sip of water.     Problem: CAD (coronary artery disease)  Assessment and Plan: Patient with a history of CAD on Asprin and Plavix.   Plavix plan per cardiology - Hold 5 days prior to surgery - awaiting written instructions.   Last dose of Plavix : 9/13/2019.  Asprin plan: Continue Asprin, pt with a history of Stent. PROBLEM DIAGNOSES  Problem: Chronic cholecystitis  Assessment and Plan: Patient is scheduled for Laparoscopic cholecystectomy scheduled on 9/19/2019  with Dr. Rios.  Preop instructions, pepcid, surgical scrub provided. Pt stated understanding. Teach back method utilized.   Pending medical evaluation per surgeon per patient obtain for PST.   Pending cardiology evaluation per surgeon and PST- history of CAD, s/p stent, loop recorder, awaiting written instructions.   Loop recorder - OR booking notified.   History of loop recorder [employer] : Serial Number [id number]  Model [id number]  Spoke to Dr. Wong- [id number]- Ok to have at [facility] with Loop recorder.   Case discussed with Dr. Serra.   Spoke to Dr. Stahl -Patient's cardiologist on 9/11/2019 at 4:57pm - Patient to hold Plavix 5 days prior to surgery.   Dr. Stahl - reports had a history of multiple syncopes in the past - had Loop recorder placed in April 2019. Per patient denies any syncopal episodes since placement of Loop recorder.       Problem: Snoring  Assessment and Plan: NASRIN precautions, OR booking notified.      Problem: Hypertension  Assessment and Plan: Patient instructed to take Amlodopine, Metoprolol, and Ramipril in the AM of surgery with a sip of water.     Problem: CAD (coronary artery disease)  Assessment and Plan: Patient with a history of CAD on Asprin and Plavix.   Plavix plan per cardiology - Hold 5 days prior to surgery - awaiting written instructions.   Last dose of Plavix : 9/13/2019.  Asprin plan: Continue Asprin, pt with a history of Stent.

## 2019-09-11 NOTE — H&P PST ADULT - ASSESSMENT
Patient is scheduled for Laparoscopic cholecystectomy scheduled on 9/19/2019  with Dr. Rios. Pre op diagnosis: Chronic Cholecystitis.

## 2019-09-11 NOTE — H&P PST ADULT - VISION (WITH CORRECTIVE LENSES IF THE PATIENT USUALLY WEARS THEM):
Partially impaired: cannot see medication labels or newsprint, but can see obstacles in path, and the surrounding layout; can count fingers at arm's length/Eye glasses

## 2019-09-11 NOTE — H&P PST ADULT - NEGATIVE NEUROLOGICAL SYMPTOMS
no weakness/no confusion/no loss of sensation/no generalized seizures/no tremors/no transient paralysis/no paresthesias/no vertigo/no focal seizures/no syncope/no headache/no difficulty walking no weakness/no headache/no generalized seizures/no vertigo/no loss of sensation/no tremors/no difficulty walking/no focal seizures/no confusion/no paresthesias/no transient paralysis

## 2019-09-11 NOTE — H&P PST ADULT - NSICDXPASTMEDICALHX_GEN_ALL_CORE_FT
PAST MEDICAL HISTORY:  BPH (benign prostatic hyperplasia)     CAD (coronary artery disease) S/P coronary artery stent placement    Carotid artery stenosis     Cholelithiasis     History of chronic cholecystitis     HLD (hyperlipidemia)     HTN (hypertension)     Hypertension PAST MEDICAL HISTORY:  BPH (benign prostatic hyperplasia)     CAD (coronary artery disease) S/P coronary artery stent placement    Carotid artery stenosis     Cholelithiasis     History of chronic cholecystitis     HLD (hyperlipidemia)     Hypertension

## 2019-09-11 NOTE — H&P PST ADULT - NSICDXPASTSURGICALHX_GEN_ALL_CORE_FT
PAST SURGICAL HISTORY:  History of loop recorder [employer] : Serial Number [id number]  Model [id number]      S/P coronary artery stent placement 2017 PAST SURGICAL HISTORY:  History of loop recorder [employer] acquired by [employer] : Serial Number [id number]  Model 04670      S/P coronary artery stent placement 2017

## 2019-09-11 NOTE — H&P PST ADULT - MUSCULOSKELETAL
details… detailed exam no joint swelling/no calf tenderness/normal strength/ROM intact/no joint erythema/no joint warmth

## 2019-09-11 NOTE — H&P PST ADULT - EKG AND INTERPRETATION
EKG completed by cardiologist on 6/2019 - requesting a copy EKG completed by cardiologist on 6/2019 - requesting a copy  EKG in chart from April 2019 in chart.

## 2019-09-11 NOTE — H&P PST ADULT - NEGATIVE GENERAL GENITOURINARY SYMPTOMS
no urine discoloration/no incontinence/no urinary hesitancy/no dysuria/no hematuria/no flank pain R/no bladder infections/normal urinary frequency/no flank pain L

## 2019-09-11 NOTE — H&P PST ADULT - RS GEN PE MLT RESP DETAILS PC
airway patent/no wheezes/respirations non-labored/good air movement/breath sounds equal/clear to auscultation bilaterally/no rhonchi/no rales

## 2019-09-11 NOTE — H&P PST ADULT - DENTITION
Denies loose teeth, Upper partial denture removable Denies loose teeth, Upper partial dentures removable-in PST

## 2019-09-11 NOTE — H&P PST ADULT - HISTORY OF PRESENT ILLNESS
Patient is a 69 year old male presents to Presurgical testing for an evaluation for a scheduled Laparoscopic cholecystectomy scheduled on 9/19/2019  with Dr. Rios. Pre op diagnosis: Chronic Cholecystitis. Patient reports Intermittent Right sided abdominal pain, Patient is s/p Abdominal  ultrasound with abnormal findings.

## 2019-09-18 ENCOUNTER — TRANSCRIPTION ENCOUNTER (OUTPATIENT)
Age: 69
End: 2019-09-18

## 2019-09-19 ENCOUNTER — OUTPATIENT (OUTPATIENT)
Dept: OUTPATIENT SERVICES | Facility: HOSPITAL | Age: 69
LOS: 1 days | Discharge: HOME | End: 2019-09-19
Payer: MEDICARE

## 2019-09-19 ENCOUNTER — RESULT REVIEW (OUTPATIENT)
Age: 69
End: 2019-09-19

## 2019-09-19 VITALS
HEART RATE: 83 BPM | OXYGEN SATURATION: 99 % | RESPIRATION RATE: 16 BRPM | HEIGHT: 67 IN | TEMPERATURE: 96 F | SYSTOLIC BLOOD PRESSURE: 196 MMHG | DIASTOLIC BLOOD PRESSURE: 63 MMHG | WEIGHT: 132.06 LBS

## 2019-09-19 VITALS
HEART RATE: 66 BPM | OXYGEN SATURATION: 98 % | SYSTOLIC BLOOD PRESSURE: 148 MMHG | RESPIRATION RATE: 13 BRPM | DIASTOLIC BLOOD PRESSURE: 71 MMHG | TEMPERATURE: 98 F

## 2019-09-19 DIAGNOSIS — K81.1 CHRONIC CHOLECYSTITIS: ICD-10-CM

## 2019-09-19 DIAGNOSIS — Z95.5 PRESENCE OF CORONARY ANGIOPLASTY IMPLANT AND GRAFT: Chronic | ICD-10-CM

## 2019-09-19 DIAGNOSIS — Z98.890 OTHER SPECIFIED POSTPROCEDURAL STATES: Chronic | ICD-10-CM

## 2019-09-19 PROCEDURE — 88304 TISSUE EXAM BY PATHOLOGIST: CPT | Mod: 26

## 2019-09-19 NOTE — ASU DISCHARGE PLAN (ADULT/PEDIATRIC) - CARE PROVIDER_API CALL
Durga Rios)  ColonRectal Surgery; Surgery  [address]  [place] [address]  Phone: [phone]  Fax: [phone]  Follow Up Time:

## 2019-09-19 NOTE — BRIEF OPERATIVE NOTE - NSICDXBRIEFPREOP_GEN_ALL_CORE_FT
PRE-OP DIAGNOSIS:  Chronic calculous cholecystitis 19-Sep-2019 10:11:24  Delia Wilkins  Chronic calculous cholecystitis 19-Sep-2019 10:11:06  Delia Wilkins

## 2019-09-19 NOTE — ASU DISCHARGE PLAN (ADULT/PEDIATRIC) - CALL YOUR DOCTOR IF YOU HAVE ANY OF THE FOLLOWING:
Nausea and vomiting that does not stop/Pain not relieved by Medications/Unable to urinate/Bleeding that does not stop/Swelling that gets worse/Fever greater than (need to indicate Fahrenheit or Celsius)/Wound/Surgical Site with redness, or foul smelling discharge or pus Bleeding that does not stop/Wound/Surgical Site with redness, or foul smelling discharge or pus/Pain not relieved by Medications/Numbness, tingling, color or temperature change to extremity/Swelling that gets worse/Inability to tolerate liquids or foods/Fever greater than (need to indicate Fahrenheit or Celsius)/Unable to urinate/Nausea and vomiting that does not stop

## 2019-09-26 LAB — SURGICAL PATHOLOGY STUDY: SIGNIFICANT CHANGE UP

## 2019-10-14 PROBLEM — Z87.19 PERSONAL HISTORY OF OTHER DISEASES OF THE DIGESTIVE SYSTEM: Chronic | Status: ACTIVE | Noted: 2019-09-11

## 2019-10-14 PROBLEM — I10 ESSENTIAL (PRIMARY) HYPERTENSION: Chronic | Status: ACTIVE | Noted: 2019-09-11

## 2019-10-15 ENCOUNTER — APPOINTMENT (OUTPATIENT)
Dept: UROLOGY | Facility: CLINIC | Age: 69
End: 2019-10-15
Payer: MEDICARE

## 2019-10-15 VITALS
HEIGHT: 67 IN | HEART RATE: 85 BPM | DIASTOLIC BLOOD PRESSURE: 79 MMHG | TEMPERATURE: 97.6 F | SYSTOLIC BLOOD PRESSURE: 164 MMHG | RESPIRATION RATE: 17 BRPM | BODY MASS INDEX: 20.4 KG/M2 | WEIGHT: 130 LBS

## 2019-10-15 DIAGNOSIS — N39.43 POST-VOID DRIBBLING: ICD-10-CM

## 2019-10-15 PROCEDURE — 99213 OFFICE O/P EST LOW 20 MIN: CPT

## 2019-10-22 ENCOUNTER — APPOINTMENT (OUTPATIENT)
Dept: ELECTROPHYSIOLOGY | Facility: CLINIC | Age: 69
End: 2019-10-22
Payer: MEDICARE

## 2019-10-22 DIAGNOSIS — Z87.898 PERSONAL HISTORY OF OTHER SPECIFIED CONDITIONS: ICD-10-CM

## 2019-10-22 PROCEDURE — 93285 PRGRMG DEV EVAL SCRMS IP: CPT

## 2019-10-24 NOTE — ASSESSMENT
[FreeTextEntry1] : Trial of alfuzosin 10 mg once daily (discontinue tamsulosin).\par Goals of medication reviewed.  Discussed the potential adverse effects of the medication.  Discussed the proper administration of the medication.\par \par If symptoms improved then continue medication and follow up in one year.

## 2019-10-24 NOTE — HISTORY OF PRESENT ILLNESS
[FreeTextEntry1] : Here for one-year follow-up.\par BPH: Remains on tamsulosin.  As last visit finasteride was discontinued.\par July 2018 PSA: 0.6 ng/mL.\par \par Urinary flow is okay. Primary complaint is nocturia and intermittent feeling of incomplete bladder emptying.\par No hematuria, dysuria or incontinence.\par No other significant changes in medical hx.

## 2020-07-30 ENCOUNTER — APPOINTMENT (OUTPATIENT)
Dept: ELECTROPHYSIOLOGY | Facility: CLINIC | Age: 70
End: 2020-07-30

## 2020-08-13 ENCOUNTER — APPOINTMENT (OUTPATIENT)
Dept: ELECTROPHYSIOLOGY | Facility: CLINIC | Age: 70
End: 2020-08-13
Payer: MEDICARE

## 2020-08-13 VITALS
BODY MASS INDEX: 20.52 KG/M2 | HEART RATE: 68 BPM | WEIGHT: 131 LBS | DIASTOLIC BLOOD PRESSURE: 92 MMHG | RESPIRATION RATE: 16 BRPM | TEMPERATURE: 97.6 F | OXYGEN SATURATION: 98 % | SYSTOLIC BLOOD PRESSURE: 178 MMHG

## 2020-08-13 DIAGNOSIS — E78.5 HYPERLIPIDEMIA, UNSPECIFIED: ICD-10-CM

## 2020-08-13 DIAGNOSIS — R42 DIZZINESS AND GIDDINESS: ICD-10-CM

## 2020-08-13 DIAGNOSIS — I10 ESSENTIAL (PRIMARY) HYPERTENSION: ICD-10-CM

## 2020-08-13 DIAGNOSIS — R35.1 NOCTURIA: ICD-10-CM

## 2020-08-13 PROCEDURE — 93000 ELECTROCARDIOGRAM COMPLETE: CPT

## 2020-08-13 PROCEDURE — 99214 OFFICE O/P EST MOD 30 MIN: CPT

## 2020-08-13 NOTE — PHYSICAL EXAM
[General Appearance - Well Developed] : well developed [Normal Appearance] : normal appearance [Well Groomed] : well groomed [General Appearance - Well Nourished] : well nourished [General Appearance - In No Acute Distress] : no acute distress [No Deformities] : no deformities [Normal Conjunctiva] : the conjunctiva exhibited no abnormalities [Eyelids - No Xanthelasma] : the eyelids demonstrated no xanthelasmas [Normal Oral Mucosa] : normal oral mucosa [No Oral Pallor] : no oral pallor [No Oral Cyanosis] : no oral cyanosis [Normal Jugular Venous A Waves Present] : normal jugular venous A waves present [Heart Rate And Rhythm] : heart rate and rhythm were normal [No Jugular Venous Pearce A Waves] : no jugular venous pearce A waves [Normal Jugular Venous V Waves Present] : normal jugular venous V waves present [Heart Sounds] : normal S1 and S2 [Murmurs] : no murmurs present [Respiration, Rhythm And Depth] : normal respiratory rhythm and effort [Exaggerated Use Of Accessory Muscles For Inspiration] : no accessory muscle use [Auscultation Breath Sounds / Voice Sounds] : lungs were clear to auscultation bilaterally [Abdomen Soft] : soft [Abdomen Tenderness] : non-tender [Abdomen Mass (___ Cm)] : no abdominal mass palpated [Abnormal Walk] : normal gait [Gait - Sufficient For Exercise Testing] : the gait was sufficient for exercise testing [Nail Clubbing] : no clubbing of the fingernails [Petechial Hemorrhages (___cm)] : no petechial hemorrhages [Cyanosis, Localized] : no localized cyanosis [] : no rash [Skin Color & Pigmentation] : normal skin color and pigmentation [No Venous Stasis] : no venous stasis [Skin Lesions] : no skin lesions [No Skin Ulcers] : no skin ulcer [No Xanthoma] : no  xanthoma was observed [Oriented To Time, Place, And Person] : oriented to person, place, and time [No Anxiety] : not feeling anxious [Mood] : the mood was normal [Affect] : the affect was normal

## 2020-08-13 NOTE — HISTORY OF PRESENT ILLNESS
[FreeTextEntry1] : Gilson Colon MD\par \par Zane Herndon is a 71y/o man with Hx of HTN, HLD, BPH, CAD s/p PCI to LAD, all of which are stable, and syncope s/p ILR placement who presents today for routine f/u. ILR initially placed in 2019 for episode of syncope in setting of severe pain, likely vasovagal. ILR without evidence of bradyarrhythmias. No recurrent syncope although occasional dizziness upon positional changes. Denies chest pain, palpitations, SOB, and no further syncope or near syncope. Wishes to have ILR removed. \par

## 2020-08-13 NOTE — DISCUSSION/SUMMARY
[FreeTextEntry1] : Zane Herndon is a 71y/o man with Hx of HTN, HLD, BPH, CAD s/p PCI to LAD, all of which are stable, and syncope s/p ILR placement who presents today for routine f/u. \par \par Impression:\par \par 1. Syncope: s/p ILR placement. ILR without evidence of bradyarrhythmias. Syncope likely vasovagal in setting of acute pain. Wishes to have ILR explanted. Risks, benefits, and alternatives discussed. \par \par 2. HTN: resume oral antihypertensives as prescribed. Encouraged heart healthy diet, sodium restriction, and weight loss. Continue regular f/u with Cardiologist for further HTN management.\par \par 3. HLD: resume statin therapy as prescribed and regular f/u with Cardiologist for routine lipid monitoring and management.\par \par Plan for ILR explant.

## 2020-08-16 ENCOUNTER — NON-APPOINTMENT (OUTPATIENT)
Age: 70
End: 2020-08-16

## 2020-08-20 ENCOUNTER — APPOINTMENT (OUTPATIENT)
Dept: ELECTROPHYSIOLOGY | Facility: CLINIC | Age: 70
End: 2020-08-20

## 2020-08-26 DIAGNOSIS — Z01.818 ENCOUNTER FOR OTHER PREPROCEDURAL EXAMINATION: ICD-10-CM

## 2020-08-29 ENCOUNTER — APPOINTMENT (OUTPATIENT)
Dept: DISASTER EMERGENCY | Facility: CLINIC | Age: 70
End: 2020-08-29

## 2020-08-30 LAB — SARS-COV-2 N GENE NPH QL NAA+PROBE: NOT DETECTED

## 2020-09-01 ENCOUNTER — OUTPATIENT (OUTPATIENT)
Dept: OUTPATIENT SERVICES | Facility: HOSPITAL | Age: 70
LOS: 1 days | Discharge: HOME | End: 2020-09-01
Payer: MEDICARE

## 2020-09-01 DIAGNOSIS — Z98.890 OTHER SPECIFIED POSTPROCEDURAL STATES: Chronic | ICD-10-CM

## 2020-09-01 DIAGNOSIS — R55 SYNCOPE AND COLLAPSE: ICD-10-CM

## 2020-09-01 DIAGNOSIS — Z95.5 PRESENCE OF CORONARY ANGIOPLASTY IMPLANT AND GRAFT: Chronic | ICD-10-CM

## 2020-09-01 PROCEDURE — 33286 RMVL SUBQ CAR RHYTHM MNTR: CPT

## 2020-09-01 RX ORDER — SODIUM CHLORIDE 9 MG/ML
3 INJECTION INTRAMUSCULAR; INTRAVENOUS; SUBCUTANEOUS EVERY 8 HOURS
Refills: 0 | Status: DISCONTINUED | OUTPATIENT
Start: 2020-09-01 | End: 2020-09-16

## 2020-09-01 NOTE — H&P CARDIOLOGY - HISTORY OF PRESENT ILLNESS
71 y/o M w/ PMH of HTN, HLD, BPH, CAD s/p PCI to LAD, and syncope s/p ILR who presents for loop explantation. See hard copy H&P from 8/13/20 in chart 71 y/o M w/ PMH of HTN, HLD, BPH, CAD s/p PCI to LAD, and syncope s/p ILR who presents for loop explantation. Pt had another syncopal episode w/ LOC on 8/21 and is unsure if his loop recorder has been checked. Pt was standing and experienced dizziness prior to losing consciousness. See hard copy H&P from 8/13/20 in chart

## 2020-09-01 NOTE — H&P CARDIOLOGY - PSH
History of loop recorder  [employer] : Serial Number [id number]  Model [id number]      S/P coronary artery stent placement  2017

## 2020-09-01 NOTE — CHART NOTE - NSCHARTNOTEFT_GEN_A_CORE
Type of Procedure: ILR explant  Licensed independent practitioner: Farhat Castillo MD  Assistant: None  Description of procedure:  After informed consent was obtained, the anterior chest was prepped and draped in the usual sterile fashion. Local anesthetic was delivered subcutaneously and a 1 inch horizontal incision was made directly over the Loop device. The fibrous capsule was identified and entered sharply. The loop was easily delivered from its pocket. The subcutaneous tissues were closed with dermabond.  Hemostatic pressure was applied and hemsostais achieved. The wound was covered with a dry sterile gauze and a dressing. The procedure was well-tolerated.  Findings of procedure: As above  Estimated blood loss: 0cc  Specimen removed: N/A  Preoperative Dx: Syncope  Postoperative Dx: Syncope  Complications: None  Anesthesia type: Local.

## 2020-09-01 NOTE — CHART NOTE - NSCHARTNOTEFT_GEN_A_CORE
Type of Procedure: ILR explant  Licensed independent practitioner: Farhat Castillo MD  Assistant: None  Description of procedure:  After informed consent was obtained, the anterior chest was prepped and draped in the usual sterile fashion. Local anesthetic was delivered subcutaneously and a 1 inch horizontal incision was made directly over the Loop device. The fibrous capsule was identified and entered sharply. The loop was easily delivered from its pocket. The subcutaneous tissues were closed with dermabond.  Hemostatic pressure was applied and hemsostais achieved. The wound was covered with a dry sterile gauze and a dressing. The procedure was well-tolerated.  Findings of procedure: As above  Estimated blood loss: 0cc  Specimen removed: N/A  Preoperative Dx: Syncope  Postoperative Dx: Syncope  Complications: None  Anesthesia type: Local

## 2020-09-01 NOTE — H&P CARDIOLOGY - NEGATIVE NEUROLOGICAL SYMPTOMS
no paresthesias/no transient paralysis/no weakness/no confusion/no focal seizures/no difficulty walking/no generalized seizures/no tremors/no headache/no hemiparesis/no facial palsy/no loss of sensation

## 2020-09-01 NOTE — H&P CARDIOLOGY - PMH
BPH (benign prostatic hyperplasia)    CAD (coronary artery disease)  S/P coronary artery stent placement  Carotid artery stenosis    Cholelithiasis    History of chronic cholecystitis    HLD (hyperlipidemia)    Hypertension

## 2020-09-18 ENCOUNTER — APPOINTMENT (OUTPATIENT)
Dept: ELECTROPHYSIOLOGY | Facility: CLINIC | Age: 70
End: 2020-09-18
Payer: MEDICARE

## 2020-09-18 DIAGNOSIS — R55 SYNCOPE AND COLLAPSE: ICD-10-CM

## 2020-09-18 PROCEDURE — 99024 POSTOP FOLLOW-UP VISIT: CPT

## 2021-02-03 ENCOUNTER — APPOINTMENT (OUTPATIENT)
Dept: UROLOGY | Facility: CLINIC | Age: 71
End: 2021-02-03
Payer: MEDICARE

## 2021-02-03 PROCEDURE — 99213 OFFICE O/P EST LOW 20 MIN: CPT

## 2021-02-03 RX ORDER — CLOPIDOGREL BISULFATE 75 MG/1
75 TABLET, FILM COATED ORAL
Refills: 0 | Status: COMPLETED | COMMUNITY
End: 2021-02-03

## 2021-02-04 LAB
APPEARANCE: CLEAR
BACTERIA UR CULT: NORMAL
BACTERIA: NEGATIVE
BILIRUBIN URINE: NEGATIVE
BLOOD URINE: NEGATIVE
COLOR: YELLOW
GLUCOSE QUALITATIVE U: NEGATIVE
HYALINE CASTS: 0 /LPF
KETONES URINE: NEGATIVE
LEUKOCYTE ESTERASE URINE: NEGATIVE
MICROSCOPIC-UA: NORMAL
NITRITE URINE: NEGATIVE
PH URINE: 6
PROTEIN URINE: NEGATIVE
RED BLOOD CELLS URINE: 1 /HPF
SPECIFIC GRAVITY URINE: 1.02
SQUAMOUS EPITHELIAL CELLS: 0 /HPF
UROBILINOGEN URINE: NORMAL
WHITE BLOOD CELLS URINE: 0 /HPF

## 2021-02-11 NOTE — HISTORY OF PRESENT ILLNESS
[FreeTextEntry1] : Presents to the office today for follow up\par History of BPH, currently on Alfuzosin\par Reports urinary stream is okay. Still has some post void dribbling\par Denies any increased urgency or frequency\par Nocturia x 2, decreased from before. \par Denies any gross hematuria. Had one episode of burning with urination last week. \par \par Had PSA level done last week at Dr. Domingo office,  1.5 ng/mL \par PSA July 2018 0.8 ng/mL

## 2021-02-11 NOTE — ASSESSMENT
[FreeTextEntry1] : PSA results reviewed.\par BPH: continue alfuzosin. Rx renewed.\par UA, culture today.

## 2021-02-22 ENCOUNTER — TRANSCRIPTION ENCOUNTER (OUTPATIENT)
Age: 71
End: 2021-02-22

## 2021-07-20 NOTE — H&P CARDIOLOGY - TOBACCO FREE, LONGEST PERIOD, PROFILE
Her labs she just had done at [facility] were coded wrong and they need to be corrected. Please call her when this has been done and sent over to [facility]? quit 5 years ago

## 2021-11-04 ENCOUNTER — APPOINTMENT (OUTPATIENT)
Dept: UROLOGY | Facility: CLINIC | Age: 71
End: 2021-11-04
Payer: MEDICARE

## 2021-11-04 PROCEDURE — 99213 OFFICE O/P EST LOW 20 MIN: CPT

## 2021-11-04 RX ORDER — ROSUVASTATIN CALCIUM 5 MG/1
TABLET, FILM COATED ORAL
Refills: 0 | Status: ACTIVE | COMMUNITY

## 2021-11-04 RX ORDER — ATORVASTATIN CALCIUM 40 MG/1
40 TABLET, FILM COATED ORAL
Refills: 0 | Status: COMPLETED | COMMUNITY
End: 2021-11-04

## 2021-11-04 RX ORDER — METOPROLOL SUCCINATE 50 MG/1
50 TABLET, EXTENDED RELEASE ORAL
Refills: 0 | Status: COMPLETED | COMMUNITY
End: 2021-11-04

## 2021-11-04 NOTE — HISTORY OF PRESENT ILLNESS
[FreeTextEntry1] : Patient is a 71 year old man with a history of BPH\par Currently on Alfuzosin\par He reports he has been trying to cut down on medications, for a while he stopped Alfuzosin, but he noted that stream slowed down and frequency increased. \par While on Alfuzosin feels that stream is good, and that he is emptying his bladder. \par \par PSA 1.5 ng/mL January 2021

## 2022-02-16 NOTE — PROGRESS NOTE ADULT - ASSESSMENT
CAGE Assessment Entered On:  1/27/2020 5:57 PM CST    Performed On:  1/27/2020 5:57 PM CST by Sagrario Avila LPN               Assessment   Have you ever felt you should cut down on your drinking :   No   Have people annoyed you by criticizing your drinking :   No   Have you ever felt bad or guilty about your drinking :   No   Have you ever taken a drink first thing in the morning to steady your nerves or get rid of a hangover (Eye-opener) :   No   CAGE Score :   0    Sagrario Avila LPN - 1/27/2020 5:57 PM CST   ECHO 12/18/2018: normal LV fx, EF 64%  mri/mra 12/19/18 evidence of mild intracranial atherosclerosis and R ica stenosis of 50 %    a/p  68 y/o M with PMH of BPH, essential HTN, cholelithiasis, hypercholesterolemia, CAD S/p PCI to mid LAD (11/2017, [facility], [facility]) carotid disease and noncompliance with meds presents after an episode of near syncope, headache and chest heaviness     1. Near Syncope   vasovagal near syncope likely triggered by neck pain   orthostatic negative   meclizine prn   CTangio head/neck revealed no evidence of acute CVA,  + 55% stenosis involving the prox Right ICA (known from previous MRI results)  CT spine noted   echo nl lv fxn  neuro f/u     2. Carotid Disease  Continue asa, plavix, statin daily. (cva PPx)   ct angio head/ neck  revealed 55% stenosis involving the prox Right ICA    3. CAD, s/p pci, atypical chest pain  stress normal prelim   no acs, HS trop negative   cont asa/plavix/statin    4. HTN   bp stable   continue with ant-htn meds        dvt ppx      d/c planning

## 2022-09-04 ENCOUNTER — INPATIENT (INPATIENT)
Facility: HOSPITAL | Age: 72
LOS: 3 days | Discharge: HOME | End: 2022-09-08
Attending: INTERNAL MEDICINE | Admitting: INTERNAL MEDICINE

## 2022-09-04 VITALS
DIASTOLIC BLOOD PRESSURE: 70 MMHG | OXYGEN SATURATION: 99 % | RESPIRATION RATE: 18 BRPM | HEIGHT: 67 IN | HEART RATE: 68 BPM | TEMPERATURE: 97 F | SYSTOLIC BLOOD PRESSURE: 146 MMHG

## 2022-09-04 DIAGNOSIS — R07.89 OTHER CHEST PAIN: ICD-10-CM

## 2022-09-04 DIAGNOSIS — R07.9 CHEST PAIN, UNSPECIFIED: ICD-10-CM

## 2022-09-04 DIAGNOSIS — Z95.5 PRESENCE OF CORONARY ANGIOPLASTY IMPLANT AND GRAFT: Chronic | ICD-10-CM

## 2022-09-04 DIAGNOSIS — M79.89 OTHER SPECIFIED SOFT TISSUE DISORDERS: ICD-10-CM

## 2022-09-04 DIAGNOSIS — R55 SYNCOPE AND COLLAPSE: ICD-10-CM

## 2022-09-04 DIAGNOSIS — I10 ESSENTIAL (PRIMARY) HYPERTENSION: ICD-10-CM

## 2022-09-04 DIAGNOSIS — Z98.890 OTHER SPECIFIED POSTPROCEDURAL STATES: Chronic | ICD-10-CM

## 2022-09-04 DIAGNOSIS — N40.0 BENIGN PROSTATIC HYPERPLASIA WITHOUT LOWER URINARY TRACT SYMPTOMS: ICD-10-CM

## 2022-09-04 DIAGNOSIS — R63.8 OTHER SYMPTOMS AND SIGNS CONCERNING FOOD AND FLUID INTAKE: ICD-10-CM

## 2022-09-04 LAB
ALBUMIN SERPL ELPH-MCNC: 4.1 G/DL — SIGNIFICANT CHANGE UP (ref 3.3–5)
ALP SERPL-CCNC: 59 U/L — SIGNIFICANT CHANGE UP (ref 40–120)
ALT FLD-CCNC: 23 U/L — SIGNIFICANT CHANGE UP (ref 4–41)
ANION GAP SERPL CALC-SCNC: 12 MMOL/L — SIGNIFICANT CHANGE UP (ref 7–14)
ANION GAP SERPL CALC-SCNC: 12 MMOL/L — SIGNIFICANT CHANGE UP (ref 7–14)
APPEARANCE UR: CLEAR — SIGNIFICANT CHANGE UP
APTT BLD: 30.1 SEC — SIGNIFICANT CHANGE UP (ref 27–36.3)
APTT BLD: 43.8 SEC — HIGH (ref 27–36.3)
AST SERPL-CCNC: 20 U/L — SIGNIFICANT CHANGE UP (ref 4–40)
BACTERIA # UR AUTO: NEGATIVE — SIGNIFICANT CHANGE UP
BASE EXCESS BLDV CALC-SCNC: 3.8 MMOL/L — HIGH (ref -2–3)
BASOPHILS # BLD AUTO: 0.04 K/UL — SIGNIFICANT CHANGE UP (ref 0–0.2)
BASOPHILS # BLD AUTO: 0.05 K/UL — SIGNIFICANT CHANGE UP (ref 0–0.2)
BASOPHILS NFR BLD AUTO: 0.9 % — SIGNIFICANT CHANGE UP (ref 0–2)
BASOPHILS NFR BLD AUTO: 1 % — SIGNIFICANT CHANGE UP (ref 0–2)
BILIRUB SERPL-MCNC: 0.5 MG/DL — SIGNIFICANT CHANGE UP (ref 0.2–1.2)
BILIRUB UR-MCNC: NEGATIVE — SIGNIFICANT CHANGE UP
BLD GP AB SCN SERPL QL: NEGATIVE — SIGNIFICANT CHANGE UP
BLOOD GAS VENOUS COMPREHENSIVE RESULT: SIGNIFICANT CHANGE UP
BUN SERPL-MCNC: 13 MG/DL — SIGNIFICANT CHANGE UP (ref 7–23)
BUN SERPL-MCNC: 17 MG/DL — SIGNIFICANT CHANGE UP (ref 7–23)
CALCIUM SERPL-MCNC: 8.6 MG/DL — SIGNIFICANT CHANGE UP (ref 8.4–10.5)
CALCIUM SERPL-MCNC: 8.6 MG/DL — SIGNIFICANT CHANGE UP (ref 8.4–10.5)
CHLORIDE BLDV-SCNC: 100 MMOL/L — SIGNIFICANT CHANGE UP (ref 96–108)
CHLORIDE SERPL-SCNC: 105 MMOL/L — SIGNIFICANT CHANGE UP (ref 98–107)
CHLORIDE SERPL-SCNC: 98 MMOL/L — SIGNIFICANT CHANGE UP (ref 98–107)
CO2 BLDV-SCNC: 31.8 MMOL/L — HIGH (ref 22–26)
CO2 SERPL-SCNC: 23 MMOL/L — SIGNIFICANT CHANGE UP (ref 22–31)
CO2 SERPL-SCNC: 24 MMOL/L — SIGNIFICANT CHANGE UP (ref 22–31)
COLOR SPEC: SIGNIFICANT CHANGE UP
CREAT SERPL-MCNC: 0.96 MG/DL — SIGNIFICANT CHANGE UP (ref 0.5–1.3)
CREAT SERPL-MCNC: 1.07 MG/DL — SIGNIFICANT CHANGE UP (ref 0.5–1.3)
D DIMER BLD IA.RAPID-MCNC: <150 NG/ML DDU — SIGNIFICANT CHANGE UP
DIFF PNL FLD: NEGATIVE — SIGNIFICANT CHANGE UP
EGFR: 74 ML/MIN/1.73M2 — SIGNIFICANT CHANGE UP
EGFR: 84 ML/MIN/1.73M2 — SIGNIFICANT CHANGE UP
EOSINOPHIL # BLD AUTO: 0.1 K/UL — SIGNIFICANT CHANGE UP (ref 0–0.5)
EOSINOPHIL # BLD AUTO: 0.38 K/UL — SIGNIFICANT CHANGE UP (ref 0–0.5)
EOSINOPHIL NFR BLD AUTO: 2.5 % — SIGNIFICANT CHANGE UP (ref 0–6)
EOSINOPHIL NFR BLD AUTO: 6.8 % — HIGH (ref 0–6)
EPI CELLS # UR: 0 /HPF — SIGNIFICANT CHANGE UP (ref 0–5)
FLUAV AG NPH QL: SIGNIFICANT CHANGE UP
FLUBV AG NPH QL: SIGNIFICANT CHANGE UP
GAS PNL BLDV: 135 MMOL/L — LOW (ref 136–145)
GLUCOSE BLDV-MCNC: 132 MG/DL — HIGH (ref 70–99)
GLUCOSE SERPL-MCNC: 131 MG/DL — HIGH (ref 70–99)
GLUCOSE SERPL-MCNC: 184 MG/DL — HIGH (ref 70–99)
GLUCOSE UR QL: NEGATIVE — SIGNIFICANT CHANGE UP
HCO3 BLDV-SCNC: 30 MMOL/L — HIGH (ref 22–29)
HCT VFR BLD CALC: 45.2 % — SIGNIFICANT CHANGE UP (ref 39–50)
HCT VFR BLD CALC: 48.2 % — SIGNIFICANT CHANGE UP (ref 39–50)
HCT VFR BLDA CALC: 45 % — SIGNIFICANT CHANGE UP (ref 39–51)
HGB BLD CALC-MCNC: 15.1 G/DL — SIGNIFICANT CHANGE UP (ref 13–17)
HGB BLD-MCNC: 15.4 G/DL — SIGNIFICANT CHANGE UP (ref 13–17)
HGB BLD-MCNC: 15.9 G/DL — SIGNIFICANT CHANGE UP (ref 13–17)
IANC: 2.61 K/UL — SIGNIFICANT CHANGE UP (ref 1.8–7.4)
IANC: 3.04 K/UL — SIGNIFICANT CHANGE UP (ref 1.8–7.4)
IMM GRANULOCYTES NFR BLD AUTO: 0.2 % — SIGNIFICANT CHANGE UP (ref 0–1.5)
IMM GRANULOCYTES NFR BLD AUTO: 0.2 % — SIGNIFICANT CHANGE UP (ref 0–1.5)
INR BLD: 1 RATIO — SIGNIFICANT CHANGE UP (ref 0.88–1.16)
INR BLD: 1.05 RATIO — SIGNIFICANT CHANGE UP (ref 0.88–1.16)
KETONES UR-MCNC: NEGATIVE — SIGNIFICANT CHANGE UP
LACTATE BLDV-MCNC: 1 MMOL/L — SIGNIFICANT CHANGE UP (ref 0.5–2)
LEUKOCYTE ESTERASE UR-ACNC: NEGATIVE — SIGNIFICANT CHANGE UP
LYMPHOCYTES # BLD AUTO: 1.07 K/UL — SIGNIFICANT CHANGE UP (ref 1–3.3)
LYMPHOCYTES # BLD AUTO: 1.53 K/UL — SIGNIFICANT CHANGE UP (ref 1–3.3)
LYMPHOCYTES # BLD AUTO: 26.7 % — SIGNIFICANT CHANGE UP (ref 13–44)
LYMPHOCYTES # BLD AUTO: 27.3 % — SIGNIFICANT CHANGE UP (ref 13–44)
MAGNESIUM SERPL-MCNC: 2.2 MG/DL — SIGNIFICANT CHANGE UP (ref 1.6–2.6)
MAGNESIUM SERPL-MCNC: 2.3 MG/DL — SIGNIFICANT CHANGE UP (ref 1.6–2.6)
MCHC RBC-ENTMCNC: 30.9 PG — SIGNIFICANT CHANGE UP (ref 27–34)
MCHC RBC-ENTMCNC: 31.3 PG — SIGNIFICANT CHANGE UP (ref 27–34)
MCHC RBC-ENTMCNC: 33 GM/DL — SIGNIFICANT CHANGE UP (ref 32–36)
MCHC RBC-ENTMCNC: 34.1 GM/DL — SIGNIFICANT CHANGE UP (ref 32–36)
MCV RBC AUTO: 91.9 FL — SIGNIFICANT CHANGE UP (ref 80–100)
MCV RBC AUTO: 93.6 FL — SIGNIFICANT CHANGE UP (ref 80–100)
MONOCYTES # BLD AUTO: 0.18 K/UL — SIGNIFICANT CHANGE UP (ref 0–0.9)
MONOCYTES # BLD AUTO: 0.59 K/UL — SIGNIFICANT CHANGE UP (ref 0–0.9)
MONOCYTES NFR BLD AUTO: 10.5 % — SIGNIFICANT CHANGE UP (ref 2–14)
MONOCYTES NFR BLD AUTO: 4.5 % — SIGNIFICANT CHANGE UP (ref 2–14)
NEUTROPHILS # BLD AUTO: 2.61 K/UL — SIGNIFICANT CHANGE UP (ref 1.8–7.4)
NEUTROPHILS # BLD AUTO: 3.04 K/UL — SIGNIFICANT CHANGE UP (ref 1.8–7.4)
NEUTROPHILS NFR BLD AUTO: 54.3 % — SIGNIFICANT CHANGE UP (ref 43–77)
NEUTROPHILS NFR BLD AUTO: 65.1 % — SIGNIFICANT CHANGE UP (ref 43–77)
NITRITE UR-MCNC: NEGATIVE — SIGNIFICANT CHANGE UP
NRBC # BLD: 0 /100 WBCS — SIGNIFICANT CHANGE UP (ref 0–0)
NRBC # BLD: 0 /100 WBCS — SIGNIFICANT CHANGE UP (ref 0–0)
NRBC # FLD: 0 K/UL — SIGNIFICANT CHANGE UP (ref 0–0)
NRBC # FLD: 0 K/UL — SIGNIFICANT CHANGE UP (ref 0–0)
NT-PROBNP SERPL-SCNC: 53 PG/ML — SIGNIFICANT CHANGE UP
PCO2 BLDV: 51 MMHG — SIGNIFICANT CHANGE UP (ref 42–55)
PH BLDV: 7.38 — SIGNIFICANT CHANGE UP (ref 7.32–7.43)
PH UR: 6 — SIGNIFICANT CHANGE UP (ref 5–8)
PHOSPHATE SERPL-MCNC: 2.8 MG/DL — SIGNIFICANT CHANGE UP (ref 2.5–4.5)
PLATELET # BLD AUTO: 166 K/UL — SIGNIFICANT CHANGE UP (ref 150–400)
PLATELET # BLD AUTO: 166 K/UL — SIGNIFICANT CHANGE UP (ref 150–400)
PO2 BLDV: 41 MMHG — SIGNIFICANT CHANGE UP
POTASSIUM BLDV-SCNC: 3.3 MMOL/L — LOW (ref 3.5–5.1)
POTASSIUM SERPL-MCNC: 3.3 MMOL/L — LOW (ref 3.5–5.3)
POTASSIUM SERPL-MCNC: 3.7 MMOL/L — SIGNIFICANT CHANGE UP (ref 3.5–5.3)
POTASSIUM SERPL-SCNC: 3.3 MMOL/L — LOW (ref 3.5–5.3)
POTASSIUM SERPL-SCNC: 3.7 MMOL/L — SIGNIFICANT CHANGE UP (ref 3.5–5.3)
PROT SERPL-MCNC: 6.9 G/DL — SIGNIFICANT CHANGE UP (ref 6–8.3)
PROT UR-MCNC: NEGATIVE — SIGNIFICANT CHANGE UP
PROTHROM AB SERPL-ACNC: 11.6 SEC — SIGNIFICANT CHANGE UP (ref 10.5–13.4)
PROTHROM AB SERPL-ACNC: 12.2 SEC — SIGNIFICANT CHANGE UP (ref 10.5–13.4)
RBC # BLD: 4.92 M/UL — SIGNIFICANT CHANGE UP (ref 4.2–5.8)
RBC # BLD: 5.15 M/UL — SIGNIFICANT CHANGE UP (ref 4.2–5.8)
RBC # FLD: 13.2 % — SIGNIFICANT CHANGE UP (ref 10.3–14.5)
RBC # FLD: 13.3 % — SIGNIFICANT CHANGE UP (ref 10.3–14.5)
RBC CASTS # UR COMP ASSIST: 0 /HPF — SIGNIFICANT CHANGE UP (ref 0–4)
RH IG SCN BLD-IMP: POSITIVE — SIGNIFICANT CHANGE UP
RSV RNA NPH QL NAA+NON-PROBE: SIGNIFICANT CHANGE UP
SAO2 % BLDV: 64.3 % — SIGNIFICANT CHANGE UP
SARS-COV-2 RNA SPEC QL NAA+PROBE: SIGNIFICANT CHANGE UP
SODIUM SERPL-SCNC: 134 MMOL/L — LOW (ref 135–145)
SODIUM SERPL-SCNC: 140 MMOL/L — SIGNIFICANT CHANGE UP (ref 135–145)
SP GR SPEC: 1.02 — SIGNIFICANT CHANGE UP (ref 1.01–1.05)
TROPONIN T, HIGH SENSITIVITY RESULT: 11 NG/L — SIGNIFICANT CHANGE UP
TROPONIN T, HIGH SENSITIVITY RESULT: 11 NG/L — SIGNIFICANT CHANGE UP
UROBILINOGEN FLD QL: SIGNIFICANT CHANGE UP
WBC # BLD: 4.01 K/UL — SIGNIFICANT CHANGE UP (ref 3.8–10.5)
WBC # BLD: 5.6 K/UL — SIGNIFICANT CHANGE UP (ref 3.8–10.5)
WBC # FLD AUTO: 4.01 K/UL — SIGNIFICANT CHANGE UP (ref 3.8–10.5)
WBC # FLD AUTO: 5.6 K/UL — SIGNIFICANT CHANGE UP (ref 3.8–10.5)
WBC UR QL: 1 /HPF — SIGNIFICANT CHANGE UP (ref 0–5)

## 2022-09-04 PROCEDURE — 70450 CT HEAD/BRAIN W/O DYE: CPT | Mod: 26,MA,59

## 2022-09-04 PROCEDURE — 93010 ELECTROCARDIOGRAM REPORT: CPT

## 2022-09-04 PROCEDURE — 99285 EMERGENCY DEPT VISIT HI MDM: CPT | Mod: CS,25,GC

## 2022-09-04 PROCEDURE — 71045 X-RAY EXAM CHEST 1 VIEW: CPT | Mod: 26

## 2022-09-04 PROCEDURE — 99223 1ST HOSP IP/OBS HIGH 75: CPT

## 2022-09-04 PROCEDURE — 70496 CT ANGIOGRAPHY HEAD: CPT | Mod: 26

## 2022-09-04 PROCEDURE — 70498 CT ANGIOGRAPHY NECK: CPT | Mod: 26

## 2022-09-04 RX ORDER — SODIUM CHLORIDE 9 MG/ML
500 INJECTION INTRAMUSCULAR; INTRAVENOUS; SUBCUTANEOUS ONCE
Refills: 0 | Status: COMPLETED | OUTPATIENT
Start: 2022-09-04 | End: 2022-09-04

## 2022-09-04 RX ORDER — ATORVASTATIN CALCIUM 80 MG/1
1 TABLET, FILM COATED ORAL
Qty: 0 | Refills: 0 | DISCHARGE

## 2022-09-04 RX ORDER — AMLODIPINE BESYLATE 2.5 MG/1
5 TABLET ORAL DAILY
Refills: 0 | Status: DISCONTINUED | OUTPATIENT
Start: 2022-09-04 | End: 2022-09-08

## 2022-09-04 RX ORDER — HEPARIN SODIUM 5000 [USP'U]/ML
5000 INJECTION INTRAVENOUS; SUBCUTANEOUS EVERY 12 HOURS
Refills: 0 | Status: DISCONTINUED | OUTPATIENT
Start: 2022-09-04 | End: 2022-09-08

## 2022-09-04 RX ORDER — POTASSIUM CHLORIDE 20 MEQ
40 PACKET (EA) ORAL ONCE
Refills: 0 | Status: COMPLETED | OUTPATIENT
Start: 2022-09-04 | End: 2022-09-04

## 2022-09-04 RX ORDER — ACETAMINOPHEN 500 MG
650 TABLET ORAL EVERY 6 HOURS
Refills: 0 | Status: DISCONTINUED | OUTPATIENT
Start: 2022-09-04 | End: 2022-09-08

## 2022-09-04 RX ORDER — INFLUENZA VIRUS VACCINE 15; 15; 15; 15 UG/.5ML; UG/.5ML; UG/.5ML; UG/.5ML
0.7 SUSPENSION INTRAMUSCULAR ONCE
Refills: 0 | Status: COMPLETED | OUTPATIENT
Start: 2022-09-04 | End: 2022-09-04

## 2022-09-04 RX ORDER — TAMSULOSIN HYDROCHLORIDE 0.4 MG/1
0.8 CAPSULE ORAL AT BEDTIME
Refills: 0 | Status: DISCONTINUED | OUTPATIENT
Start: 2022-09-04 | End: 2022-09-08

## 2022-09-04 RX ORDER — ASPIRIN/CALCIUM CARB/MAGNESIUM 324 MG
81 TABLET ORAL DAILY
Refills: 0 | Status: DISCONTINUED | OUTPATIENT
Start: 2022-09-05 | End: 2022-09-08

## 2022-09-04 RX ORDER — FAMOTIDINE 10 MG/ML
20 INJECTION INTRAVENOUS ONCE
Refills: 0 | Status: COMPLETED | OUTPATIENT
Start: 2022-09-04 | End: 2022-09-04

## 2022-09-04 RX ORDER — ONDANSETRON 8 MG/1
4 TABLET, FILM COATED ORAL ONCE
Refills: 0 | Status: COMPLETED | OUTPATIENT
Start: 2022-09-04 | End: 2022-09-04

## 2022-09-04 RX ORDER — RAMIPRIL 5 MG
1 CAPSULE ORAL
Qty: 0 | Refills: 0 | DISCHARGE

## 2022-09-04 RX ORDER — ASPIRIN/CALCIUM CARB/MAGNESIUM 324 MG
243 TABLET ORAL ONCE
Refills: 0 | Status: COMPLETED | OUTPATIENT
Start: 2022-09-04 | End: 2022-09-04

## 2022-09-04 RX ORDER — ATORVASTATIN CALCIUM 80 MG/1
80 TABLET, FILM COATED ORAL AT BEDTIME
Refills: 0 | Status: DISCONTINUED | OUTPATIENT
Start: 2022-09-04 | End: 2022-09-08

## 2022-09-04 RX ADMIN — Medication 243 MILLIGRAM(S): at 02:22

## 2022-09-04 RX ADMIN — ONDANSETRON 4 MILLIGRAM(S): 8 TABLET, FILM COATED ORAL at 02:27

## 2022-09-04 RX ADMIN — ATORVASTATIN CALCIUM 80 MILLIGRAM(S): 80 TABLET, FILM COATED ORAL at 22:11

## 2022-09-04 RX ADMIN — AMLODIPINE BESYLATE 5 MILLIGRAM(S): 2.5 TABLET ORAL at 10:33

## 2022-09-04 RX ADMIN — SODIUM CHLORIDE 500 MILLILITER(S): 9 INJECTION INTRAMUSCULAR; INTRAVENOUS; SUBCUTANEOUS at 02:23

## 2022-09-04 RX ADMIN — FAMOTIDINE 20 MILLIGRAM(S): 10 INJECTION INTRAVENOUS at 02:27

## 2022-09-04 RX ADMIN — Medication 40 MILLIEQUIVALENT(S): at 17:57

## 2022-09-04 RX ADMIN — Medication 650 MILLIGRAM(S): at 09:57

## 2022-09-04 RX ADMIN — TAMSULOSIN HYDROCHLORIDE 0.8 MILLIGRAM(S): 0.4 CAPSULE ORAL at 22:11

## 2022-09-04 RX ADMIN — HEPARIN SODIUM 5000 UNIT(S): 5000 INJECTION INTRAVENOUS; SUBCUTANEOUS at 17:56

## 2022-09-04 RX ADMIN — Medication 650 MILLIGRAM(S): at 10:57

## 2022-09-04 NOTE — ED ADULT NURSE NOTE - NSFALLRSKASSESSDT_ED_ALL_ED
SI:    RESP FAILURE S/P EXTUBATION,

T. 98.5 HR 71 RR 18 B/P 134/95

HIGH FLOW 02 FIO2 45%

TROP 0.340 BNP 21810





IS:    SOLU MEDROL IV

ROCEPHIN IV

PROTONIX IV

ASA

SWALLOW EVAL

**********ICU STATUS******** 04-Sep-2022 02:36

## 2022-09-04 NOTE — H&P ADULT - NSICDXPASTMEDICALHX_GEN_ALL_CORE_FT
PAST MEDICAL HISTORY:  BPH (benign prostatic hyperplasia)     CAD (coronary artery disease) S/P coronary artery stent placement    Carotid artery stenosis     Cholelithiasis     History of chronic cholecystitis     HLD (hyperlipidemia)     Hypertension

## 2022-09-04 NOTE — ED PROVIDER NOTE - OBJECTIVE STATEMENT
71 y/o M w/ pmhx of CAD w/ stent (last was 6 yrs ago) presents c/o chest pain described as heaviness. Began when he was walking to the bathroom.   Takes daily 81mg ASA, pt took one the evening prior to arrival (time of ED note 0220 AM). Pt c/o heaviness w/ chest discomfort radiating to neck. Pt states it feels similar to his cp 6 yrs ago. Pt did have syncopal episode while walking to bathroom. Denies head trauma. Denies formal AC use (only on ASA).   Cardiologist is Dr. Sami Colon.

## 2022-09-04 NOTE — H&P ADULT - HISTORY OF PRESENT ILLNESS
Patient is a 72 year old M hx of CAD s/p stent 6 years ago, HTN, HLD, BPH, here w/ chest pressure and heaviness while walking to the bathroom. He took his aspirin 81 mg at 2 am, and states he also had a syncopal event times 1 while getting up. He felt dizzy and lightheaded, and had pain on the posterior aspect of his neck. He has had 2-3 syncopal episodes this year already. His chest pain resolved in the ED. He denies fevers, chills, nausea, vomiting, diarrhea, sob, headaches, visual changes, palpitations, has had occasional left LE edema (not currently), denies dysuria, polyuria, melena, hematochezia.     ED course: troponin negative times 2, EKG no acute ST elevation or pathologic Q waves.

## 2022-09-04 NOTE — H&P ADULT - PROBLEM SELECTOR PLAN 2
-syncope unknown etiology, associated w/ occasional neck pain  -f/u CT angio head and neck   -f/u d-dimer if elevated get duplex venous  -hold ramipril, can c/w amlodipine, if orthostatics is wnl can resume ramipril  -check u/a     #Hypokalemia  -repleted

## 2022-09-04 NOTE — PATIENT PROFILE ADULT - FALL HARM RISK - HARM RISK INTERVENTIONS
Assistance with ambulation/Assistance OOB with selected safe patient handling equipment/Communicate Risk of Fall with Harm to all staff/Discuss with provider need for PT consult/Monitor gait and stability/Provide patient with walking aids - walker, cane, crutches/Reinforce activity limits and safety measures with patient and family/Review medications for side effects contributing to fall risk/Sit up slowly, dangle for a short time, stand at bedside before walking/Tailored Fall Risk Interventions/Toileting schedule using arm’s reach rule for commode and bathroom/Visual Cue: Yellow wristband and red socks/Bed in lowest position, wheels locked, appropriate side rails in place/Call bell, personal items and telephone in reach/Instruct patient to call for assistance before getting out of bed or chair/Non-slip footwear when patient is out of bed/[place] to call system/Physically safe environment - no spills, clutter or unnecessary equipment/Purposeful Proactive Rounding/Room/bathroom lighting operational, light cord in reach

## 2022-09-04 NOTE — ED PROVIDER NOTE - ATTENDING CONTRIBUTION TO CARE
pt w/ hx of CAD w/ stents p/w cp and syncope. Will eval for acs. Ordered labs, meds. TBA    I performed a history and physical exam of the patient and discussed their management with the resident. I reviewed the resident's note and agree with the documented findings and plan of care. My medical decision making and observations are found above.

## 2022-09-04 NOTE — CONSULT NOTE ADULT - SUBJECTIVE AND OBJECTIVE BOX
[facility]        Patient is a 72y old  Male who presents with a chief complaint of   Excerpt from H&P,"   Patient is a 72 year old M hx of CAD s/p stent 6 years ago, HTN, HLD, BPH, here w/ chest pressure and heaviness while walking to the bathroom. He took his aspirin 81 mg at 2 am, and states he also had a syncopal event times 1 while getting up. He felt dizzy and lightheaded, and had pain on the posterior aspect of his neck. He has had 2-3 syncopal episodes this year already. His chest pain resolved in the ED. He denies fevers, chills, nausea, vomiting, diarrhea, sob, headaches, visual changes, palpitations, has had occasional left LE edema (not currently), denies dysuria, polyuria, melena, hematochezia.     ED course: troponin negative times 2, EKG no acute ST elevation or pathologic Q waves.           (04 Sep 2022 07:26)           *****PAST MEDICAL / Surgical  HISTORY:  PAST MEDICAL & SURGICAL HISTORY:  BPH (benign prostatic hyperplasia)      Cholelithiasis      HLD (hyperlipidemia)      CAD (coronary artery disease)  S/P coronary artery stent placement      Carotid artery stenosis      History of chronic cholecystitis      Hypertension      S/P coronary artery stent placement  2017      History of loop recorder  [employer] : Serial Number [id number]  Model [id number]                   *****FAMILY HISTORY:  FAMILY HISTORY:  No pertinent family history in first degree relatives             *****SOCIAL HISTORY:  Alcohol: None  Smoking: None         *****ALLERGIES:   Allergies    Environmental (Rhinitis)  penicillins (Unknown)    Intolerances             *****MEDICATIONS: current medication reviewed and documented.   MEDICATIONS  (STANDING):  amLODIPine   Tablet 5 milliGRAM(s) Oral daily  atorvastatin 80 milliGRAM(s) Oral at bedtime  heparin   Injectable 5000 Unit(s) SubCutaneous every 12 hours  influenza  Vaccine (HIGH DOSE) 0.7 milliLiter(s) IntraMuscular once  tamsulosin 0.8 milliGRAM(s) Oral at bedtime    MEDICATIONS  (PRN):  acetaminophen     Tablet .. 650 milliGRAM(s) Oral every 6 hours PRN Temp greater or equal to 38C (100.4F), Mild Pain (1 - 3), Moderate Pain (4 - 6)           *****REVIEW OF SYSTEM:  GEN: no fever, no chills, no pain  RESP: no SOB, no cough, no sputum  CVS: no chest pain, no palpitations, no edema  GI: no abdominal pain, no nausea, no vomiting, no constipation, no diarrhea  : no dysurea, no frequency, no hematurea  Neuro: no headache, no dizziness  PSYCH: no anxiety, no depression  Derm : no itching, no rash         *****VITAL SIGNS:  T(C): 36.5 (22 @ 18:01), Max: 36.5 (22 @ 18:01)  HR: 71 (22 @ 18:01) (68 - 85)  BP: 155/75 (22 @ 18:01) (115/64 - 155/75)  RR: 16 (22 @ 18:01) (15 - 18)  SpO2: 99% (22 @ 18:01) (99% - 100%)  Wt(kg): --           *****PHYSICAL EXAM:   Alert oriented x 3   Attention comprehension are fair. Able to name, repeat, read without any difficulty.   Able to follow 3 step commands.     EOMI fundi not visualized,  VFF to confrontration  No facial asymmetry   Tongue is midline   Palate elevates symmetrically   Moving both 4 ext symmetrically   Reflexes are symmetric throughout   sensation is grossly symmetric  Gait : not assessed.  B/L down going toes               *****LAB AND IMAGING:                          15.9   4.01  )-----------( 166      ( 04 Sep 2022 10:00 )             48.2               -    140  |  105  |  13  ----------------------------<  184<H>  3.7   |  23  |  0.96    Ca    8.6      04 Sep 2022 10:00  Phos  2.8     09-04  Mg     2.30     -04    TPro  6.9  /  Alb  4.1  /  TBili  0.5  /  DBili  x   /  AST  20  /  ALT  23  /  AlkPhos  59  09-04    PT/INR - ( 04 Sep 2022 10:00 )   PT: 11.6 sec;   INR: 1.00 ratio         PTT - ( 04 Sep 2022 10:00 )  PTT:43.8 sec                        Urinalysis Basic - ( 04 Sep 2022 22:50 )    Color: Light Yellow / Appearance: Clear / S.020 / pH: x  Gluc: x / Ketone: Negative  / Bili: Negative / Urobili: <2 mg/dL   Blood: x / Protein: Negative / Nitrite: Negative   Leuk Esterase: Negative / RBC: 0 /HPF / WBC 1 /HPF   Sq Epi: x / Non Sq Epi: 0 /HPF / Bacteria: Negative        [All pertinent recent Imaging reports reviewed]         *****A S S E S S M E N T   A N D   P L A N :      Excerpt from H&P,"   Patient is a 72 year old M hx of CAD s/p stent 6 years ago, HTN, HLD, BPH, here w/ chest pressure and heaviness while walking to the bathroom. He took his aspirin 81 mg at 2 am, and states he also had a syncopal event times 1 while getting up. He felt dizzy and lightheaded, and had pain on the posterior aspect of his neck. He has had 2-3 syncopal episodes this year already. His chest pain resolved in the ED. He denies fevers, chills, nausea, vomiting, diarrhea, sob, headaches, visual changes, palpitations, has had occasional left LE edema (not currently), denies dysuria, polyuria, melena, hematochezia.     ED course: troponin negative times 2, EKG no acute ST elevation or pathologic Q waves.        Problem/Recommendations 1:    syncope .of unclear eitology   r/o orthostatic hypotension   check orthostatics  compression stockings       b12 folate tsh   Problem/Recommendations 2: neck pain   likley related to fall vs orthostatic bp      pt at risk for developing delirium, therefore please institute the following preventative measures if possible          - initiating early mobilization          -minimizing "tethers" - IV, oxygen, catheters, etc          -avoiding   sedatives          -maintaining hydration/nutrition          -avoid anticholinergics - diphenhydramine, etc          -pain control          -sleep wake cycle regulation; avoid day time somnolence           -supportive environment    ___________________________  Will follow with you.  Thank you,  Aster Dash MD  Diplomate of the American Board of Neurology and Psychiatry.  Diplomate of the American Board of Vascular Neurology.   [facility]   Ph: [phone]    Differential diagnosis and plan of care discussed with patient after the evaluation.   Advanced care planning options discussed.   Pain assessed and judicious use of narcotics when appropriate was discussed.  Importance of Fall prevention discussed.  Counseling on Smoking and Alcohol cessation was offered when appropriate.  Counseling on Diet, exercise, and medication compliance was done.   83 minutes spent on the total encounter;  more than 50 % of the visit was spent on counseling  and or coordinating care by the attending physician.    Thank you for allowing me to participate in the care of this uday patient. Please do not hesitate to call me if you have any questions.     This and subsequent notes  will  inherently be subject to errors including those of syntax and substitutions which may escape proofreading. In such instances original meaning may be extrapolated by contextual derivation.   
  CARDIOLOGY CONSULT - Dr. Colon  Date of Service: 9/4/22      HPI:  Patient is a 72 year old M hx of CAD s/p stent 6 years ago, HTN, HLD, BPH, here w/ chest pressure and heaviness while walking to the bathroom. He took his aspirin 81 mg at 2 am, and states he also had a syncopal event times 1 while getting up. He felt dizzy and lightheaded, and had pain on the posterior aspect of his neck. He has had 2-3 syncopal episodes this year already. His chest pain resolved in the ED. He denies fevers, chills, nausea, vomiting, diarrhea, sob, headaches, visual changes, palpitations, has had occasional left LE edema (not currently), denies dysuria, polyuria, melena, hematochezia.     ED course: troponin negative times 2, EKG no acute ST elevation or pathologic Q waves.           (04 Sep 2022 07:26)      PAST MEDICAL & SURGICAL HISTORY:  BPH (benign prostatic hyperplasia)      Cholelithiasis      HLD (hyperlipidemia)      CAD (coronary artery disease)  S/P coronary artery stent placement      Carotid artery stenosis      History of chronic cholecystitis      Hypertension      S/P coronary artery stent placement  2017      History of loop recorder  [employer] : Serial Number [id number]  Model [id number]                  PREVIOUS DIAGNOSTIC TESTING:    [ ] Echocardiogram:  [ ]  Catheterization:  [ ] Stress Test:  	    MEDICATIONS:  MEDICATIONS  (STANDING):  amLODIPine   Tablet 5 milliGRAM(s) Oral daily  atorvastatin 80 milliGRAM(s) Oral at bedtime  heparin   Injectable 5000 Unit(s) SubCutaneous every 12 hours  potassium chloride   Powder 40 milliEquivalent(s) Oral once  tamsulosin 0.8 milliGRAM(s) Oral at bedtime      FAMILY HISTORY:  No pertinent family history in first degree relatives        SOCIAL HISTORY:    [ ] Non-smoker  [ ] Smoker  [ ] Alcohol    Allergies    Environmental (Rhinitis)  penicillins (Unknown)    Intolerances    	    REVIEW OF SYSTEMS:  CONSTITUTIONAL: No fever, weight loss, or fatigue  EYES: No eye pain, visual disturbances, or discharge  ENMT:  No difficulty hearing, tinnitus, vertigo; No sinus or throat pain  NECK: No pain or stiffness  RESPIRATORY: No cough, wheezing, chills or hemoptysis; No Shortness of Breath  CARDIOVASCULAR: No chest pain, palpitations, passing out, dizziness, or leg swelling  GASTROINTESTINAL: No abdominal or epigastric pain. No nausea, vomiting, or hematemesis; No diarrhea or constipation. No melena or hematochezia.  GENITOURINARY: No dysuria, frequency, hematuria, or incontinence  NEUROLOGICAL: No headaches, memory loss, loss of strength, numbness, or tremors  SKIN: No itching, burning, rashes, or lesions   	    [ ] All others negative	  [ ] Unable to obtain    PHYSICAL EXAM:  T(C): 36.4 (09-04-22 @ 05:42), Max: 36.4 (09-04-22 @ 05:42)  HR: 85 (09-04-22 @ 05:42) (68 - 85)  BP: 115/64 (09-04-22 @ 10:30) (115/64 - 146/70)  RR: 15 (09-04-22 @ 05:42) (15 - 18)  SpO2: 100% (09-04-22 @ 05:42) (99% - 100%)  Wt(kg): --  I&O's Summary      Appearance: Normal	  Psychiatry: A & O x 3, Mood & affect appropriate  HEENT:   Normal oral mucosa, PERRL, EOMI	  Lymphatic: No lymphadenopathy  Cardiovascular: Normal S1 S2,RRR, No JVD, No murmurs  Respiratory: Lungs clear to auscultation	  Gastrointestinal:  Soft, Non-tender, + BS	  Skin: No rashes, No ecchymoses, No cyanosis	  Neurologic: Non-focal  Extremities: Normal range of motion, No clubbing, cyanosis or edema  Vascular: Peripheral pulses palpable 2+ bilaterally    TELEMETRY: 	    ECG:  	  RADIOLOGY:  OTHER: 	  	  LABS:	 	    CARDIAC MARKERS:                                  15.9   4.01  )-----------( 166      ( 04 Sep 2022 10:00 )             48.2     09-04    134<L>  |  98  |  17  ----------------------------<  131<H>  3.3<L>   |  24  |  1.07    Ca    8.6      04 Sep 2022 02:30  Mg     2.20     09-04    TPro  6.9  /  Alb  4.1  /  TBili  0.5  /  DBili  x   /  AST  20  /  ALT  23  /  AlkPhos  59  09-04    PT/INR - ( 04 Sep 2022 10:00 )   PT: 11.6 sec;   INR: 1.00 ratio         PTT - ( 04 Sep 2022 10:00 )  PTT:43.8 sec  proBNP: Serum Pro-Brain Natriuretic Peptide: 53 pg/mL (09-04 @ 02:30)    Lipid Profile:   HgA1c:   TSH: Thyroid Stimulating Hormone, Serum: 0.65 uIU/mL (09-04 @ 05:25)      ASSESSMENT/PLAN: 	              70 minutes spent on total encounter; more than 50% of the visit was spent counseling and/or coordinating care by the attending physician.

## 2022-09-04 NOTE — ED ADULT NURSE NOTE - OBJECTIVE STATEMENT
pt received to rm 16  , a&ox4 , ambulatory , pmh of stent placement on aspirin 6 years ago, gallbladder removal and BPH  , p/w CP, nausea, dizziness, fatigue. pt reports LOC in while using the bathroom. pt denies head trauma  . Pt breathing even and unlabored on room air. NSR on cardiac monitor. Denies fever, chills, cough, SOB,  palpitations, dizziness, N/V/D, constipation, numbness, tingling. IV placed. Labs collected and sent. EKG in chart. pending . pt educated on fall precautions and confirms understanding via teach back method. Stretcher locked in lowest position with siderails up x2. Call bell and personal items within reach.

## 2022-09-04 NOTE — H&P ADULT - ASSESSMENT
Patient is a 72 year old M hx of CAD s/p stent 6 years ago, HTN, HLD, BPH, here w/ chest pressure and heaviness while walking to the bathroom.

## 2022-09-04 NOTE — H&P ADULT - NSICDXPASTSURGICALHX_GEN_ALL_CORE_FT
PAST SURGICAL HISTORY:  History of loop recorder [employer] : Serial Number [id number]  Model [id number]      S/P coronary artery stent placement 2017

## 2022-09-04 NOTE — CONSULT NOTE ADULT - ASSESSMENT
ECHO 12/18/2018: normal LV fx, EF 64%  mri/mra 12/19/18 evidence of mild intracranial atherosclerosis and R ica stenosis of 50 %    a/p  Patient is a 72 year old M hx of CAD s/p stent 6 years ago, HTN, HLD, BPH, here w/ chest pressure and heaviness while walking to the bathroom. He took his aspirin 81 mg at 2 am, and states he also had a syncopal event times 1 while getting up     # Syncope   -history of vasovagal syncope likely triggered by neck pain   -check orthostatics  -check echo  -prior CTangio head/neck revealed no evidence of acute CVA,  + 55% stenosis involving the prox Right ICA (known from previous MRI results)  -prior mri c spine with mild spinal canal stenosis at c3-4, with foraminal stenosis R> Left  no intervention recommended per Neuro   -neuro eval  -has loop in the past s/p explant    # Carotid Disease  -Continue asa, plavix, statin daily. (cva PPx)   -ct angio head/ neck  revealed 55% stenosis involving the prox Right ICA    #. CAD, s/p pci, atypical chest pain  -no acs, HS trop negative   -cont asa/plavix/statin  -if neuro w/u negative will consider stress    # HTN   -bp stable   -continue with ant-htn meds  DVT ppx      70 minutes spent on total encounter; more than 50% of the visit was spent counseling and/or coordinating care by the attending physician.

## 2022-09-04 NOTE — ED ADULT NURSE NOTE - NSFALLRSKASSESASSIST_ED_ALL_ED
Order placed.  
Please advise if orders can be placed for FLP.  
Please replace patient  lipid panel lab order, thanks.  
Yes, this doesn't require a message  
yes

## 2022-09-04 NOTE — ED PROVIDER NOTE - CLINICAL SUMMARY MEDICAL DECISION MAKING FREE TEXT BOX
[facility] DO PGY-3: pt w/ hx of CAD w/ stents p/w cp and syncope. Will eval for acs. Ordered labs, meds. TBA

## 2022-09-04 NOTE — H&P ADULT - NSHPLABSRESULTS_GEN_ALL_CORE
LABS:                         15.4   5.60  )-----------( 166      ( 04 Sep 2022 02:30 )             45.2     09-04    134<L>  |  98  |  17  ----------------------------<  131<H>  3.3<L>   |  24  |  1.07    Ca    8.6      04 Sep 2022 02:30  Mg     2.20     09-04    TPro  6.9  /  Alb  4.1  /  TBili  0.5  /  DBili  x   /  AST  20  /  ALT  23  /  AlkPhos  59  09-04    PT/INR - ( 04 Sep 2022 02:30 )   PT: 12.2 sec;   INR: 1.05 ratio         PTT - ( 04 Sep 2022 02:30 )  PTT:30.1 sec  Serum Pro-Brain Natriuretic Peptide: 53 pg/mL (09-04 @ 02:30)      RADIOLOGY, EKG & ADDITIONAL TESTS: Reviewed.

## 2022-09-04 NOTE — ED PROVIDER NOTE - NS ED ROS FT
CONSTITUTIONAL - No fever, No diaphoresis, No weight change  SKIN - No rash  HEMATOLOGIC - No abnormal bleeding or bruising  EYES - No eye pain, No blurred vision  ENT - No change in hearing, No sore throat, No neck pain, No rhinorrhea, No ear pain  RESPIRATORY - No shortness of breath, No cough  CARDIAC +chest pain, No palpitations  GI - No abdominal pain, +nausea, No vomiting, No diarrhea, No constipation  - No dysuria, no frequency, no hematuria.   MUSCULOSKELETAL - No joint pain, No swelling, No back pain  NEUROLOGIC - No numbness, No focal weakness, No headache, No dizziness

## 2022-09-05 LAB
ANION GAP SERPL CALC-SCNC: 12 MMOL/L — SIGNIFICANT CHANGE UP (ref 7–14)
BASOPHILS # BLD AUTO: 0.05 K/UL — SIGNIFICANT CHANGE UP (ref 0–0.2)
BASOPHILS NFR BLD AUTO: 1.1 % — SIGNIFICANT CHANGE UP (ref 0–2)
BUN SERPL-MCNC: 12 MG/DL — SIGNIFICANT CHANGE UP (ref 7–23)
CALCIUM SERPL-MCNC: 8.7 MG/DL — SIGNIFICANT CHANGE UP (ref 8.4–10.5)
CHLORIDE SERPL-SCNC: 101 MMOL/L — SIGNIFICANT CHANGE UP (ref 98–107)
CO2 SERPL-SCNC: 25 MMOL/L — SIGNIFICANT CHANGE UP (ref 22–31)
CREAT SERPL-MCNC: 0.87 MG/DL — SIGNIFICANT CHANGE UP (ref 0.5–1.3)
EGFR: 92 ML/MIN/1.73M2 — SIGNIFICANT CHANGE UP
EOSINOPHIL # BLD AUTO: 0.28 K/UL — SIGNIFICANT CHANGE UP (ref 0–0.5)
EOSINOPHIL NFR BLD AUTO: 6.1 % — HIGH (ref 0–6)
GLUCOSE SERPL-MCNC: 105 MG/DL — HIGH (ref 70–99)
HCT VFR BLD CALC: 47.7 % — SIGNIFICANT CHANGE UP (ref 39–50)
HCV AB S/CO SERPL IA: 0.1 S/CO — SIGNIFICANT CHANGE UP (ref 0–0.99)
HCV AB SERPL-IMP: SIGNIFICANT CHANGE UP
HGB BLD-MCNC: 15.6 G/DL — SIGNIFICANT CHANGE UP (ref 13–17)
IANC: 2.53 K/UL — SIGNIFICANT CHANGE UP (ref 1.8–7.4)
IMM GRANULOCYTES NFR BLD AUTO: 0.2 % — SIGNIFICANT CHANGE UP (ref 0–1.5)
LYMPHOCYTES # BLD AUTO: 1.29 K/UL — SIGNIFICANT CHANGE UP (ref 1–3.3)
LYMPHOCYTES # BLD AUTO: 28.1 % — SIGNIFICANT CHANGE UP (ref 13–44)
MCHC RBC-ENTMCNC: 30.6 PG — SIGNIFICANT CHANGE UP (ref 27–34)
MCHC RBC-ENTMCNC: 32.7 GM/DL — SIGNIFICANT CHANGE UP (ref 32–36)
MCV RBC AUTO: 93.5 FL — SIGNIFICANT CHANGE UP (ref 80–100)
MONOCYTES # BLD AUTO: 0.43 K/UL — SIGNIFICANT CHANGE UP (ref 0–0.9)
MONOCYTES NFR BLD AUTO: 9.4 % — SIGNIFICANT CHANGE UP (ref 2–14)
NEUTROPHILS # BLD AUTO: 2.53 K/UL — SIGNIFICANT CHANGE UP (ref 1.8–7.4)
NEUTROPHILS NFR BLD AUTO: 55.1 % — SIGNIFICANT CHANGE UP (ref 43–77)
NRBC # BLD: 0 /100 WBCS — SIGNIFICANT CHANGE UP (ref 0–0)
NRBC # FLD: 0 K/UL — SIGNIFICANT CHANGE UP (ref 0–0)
PLATELET # BLD AUTO: 194 K/UL — SIGNIFICANT CHANGE UP (ref 150–400)
POTASSIUM SERPL-MCNC: 3.4 MMOL/L — LOW (ref 3.5–5.3)
POTASSIUM SERPL-SCNC: 3.4 MMOL/L — LOW (ref 3.5–5.3)
RBC # BLD: 5.1 M/UL — SIGNIFICANT CHANGE UP (ref 4.2–5.8)
RBC # FLD: 13.3 % — SIGNIFICANT CHANGE UP (ref 10.3–14.5)
SODIUM SERPL-SCNC: 138 MMOL/L — SIGNIFICANT CHANGE UP (ref 135–145)
WBC # BLD: 4.59 K/UL — SIGNIFICANT CHANGE UP (ref 3.8–10.5)
WBC # FLD AUTO: 4.59 K/UL — SIGNIFICANT CHANGE UP (ref 3.8–10.5)

## 2022-09-05 RX ORDER — POTASSIUM CHLORIDE 20 MEQ
40 PACKET (EA) ORAL ONCE
Refills: 0 | Status: COMPLETED | OUTPATIENT
Start: 2022-09-05 | End: 2022-09-05

## 2022-09-05 RX ADMIN — HEPARIN SODIUM 5000 UNIT(S): 5000 INJECTION INTRAVENOUS; SUBCUTANEOUS at 17:29

## 2022-09-05 RX ADMIN — AMLODIPINE BESYLATE 5 MILLIGRAM(S): 2.5 TABLET ORAL at 05:44

## 2022-09-05 RX ADMIN — HEPARIN SODIUM 5000 UNIT(S): 5000 INJECTION INTRAVENOUS; SUBCUTANEOUS at 05:44

## 2022-09-05 RX ADMIN — Medication 40 MILLIEQUIVALENT(S): at 09:07

## 2022-09-05 RX ADMIN — Medication 81 MILLIGRAM(S): at 12:24

## 2022-09-05 RX ADMIN — ATORVASTATIN CALCIUM 80 MILLIGRAM(S): 80 TABLET, FILM COATED ORAL at 21:22

## 2022-09-05 RX ADMIN — TAMSULOSIN HYDROCHLORIDE 0.8 MILLIGRAM(S): 0.4 CAPSULE ORAL at 21:21

## 2022-09-05 NOTE — PHYSICAL THERAPY INITIAL EVALUATION ADULT - PERTINENT HX OF CURRENT PROBLEM, REHAB EVAL
Patient is 72 year old M hx of CAD s/p stent 6 years ago, HTN, HLD, BPH, here with chest pressure and heaviness while walking to the bathroom.

## 2022-09-06 LAB
ANION GAP SERPL CALC-SCNC: 11 MMOL/L — SIGNIFICANT CHANGE UP (ref 7–14)
BASOPHILS # BLD AUTO: 0.05 K/UL — SIGNIFICANT CHANGE UP (ref 0–0.2)
BASOPHILS NFR BLD AUTO: 1 % — SIGNIFICANT CHANGE UP (ref 0–2)
BUN SERPL-MCNC: 13 MG/DL — SIGNIFICANT CHANGE UP (ref 7–23)
CALCIUM SERPL-MCNC: 8.9 MG/DL — SIGNIFICANT CHANGE UP (ref 8.4–10.5)
CHLORIDE SERPL-SCNC: 102 MMOL/L — SIGNIFICANT CHANGE UP (ref 98–107)
CO2 SERPL-SCNC: 26 MMOL/L — SIGNIFICANT CHANGE UP (ref 22–31)
CREAT SERPL-MCNC: 0.85 MG/DL — SIGNIFICANT CHANGE UP (ref 0.5–1.3)
EGFR: 93 ML/MIN/1.73M2 — SIGNIFICANT CHANGE UP
EOSINOPHIL # BLD AUTO: 0.29 K/UL — SIGNIFICANT CHANGE UP (ref 0–0.5)
EOSINOPHIL NFR BLD AUTO: 5.7 % — SIGNIFICANT CHANGE UP (ref 0–6)
GLUCOSE SERPL-MCNC: 89 MG/DL — SIGNIFICANT CHANGE UP (ref 70–99)
HCT VFR BLD CALC: 49.4 % — SIGNIFICANT CHANGE UP (ref 39–50)
HGB BLD-MCNC: 16.1 G/DL — SIGNIFICANT CHANGE UP (ref 13–17)
IANC: 2.26 K/UL — SIGNIFICANT CHANGE UP (ref 1.8–7.4)
IMM GRANULOCYTES NFR BLD AUTO: 0.2 % — SIGNIFICANT CHANGE UP (ref 0–1.5)
LYMPHOCYTES # BLD AUTO: 2.1 K/UL — SIGNIFICANT CHANGE UP (ref 1–3.3)
LYMPHOCYTES # BLD AUTO: 40.9 % — SIGNIFICANT CHANGE UP (ref 13–44)
MCHC RBC-ENTMCNC: 31 PG — SIGNIFICANT CHANGE UP (ref 27–34)
MCHC RBC-ENTMCNC: 32.6 GM/DL — SIGNIFICANT CHANGE UP (ref 32–36)
MCV RBC AUTO: 95 FL — SIGNIFICANT CHANGE UP (ref 80–100)
MONOCYTES # BLD AUTO: 0.42 K/UL — SIGNIFICANT CHANGE UP (ref 0–0.9)
MONOCYTES NFR BLD AUTO: 8.2 % — SIGNIFICANT CHANGE UP (ref 2–14)
NEUTROPHILS # BLD AUTO: 2.26 K/UL — SIGNIFICANT CHANGE UP (ref 1.8–7.4)
NEUTROPHILS NFR BLD AUTO: 44 % — SIGNIFICANT CHANGE UP (ref 43–77)
NRBC # BLD: 0 /100 WBCS — SIGNIFICANT CHANGE UP (ref 0–0)
NRBC # FLD: 0 K/UL — SIGNIFICANT CHANGE UP (ref 0–0)
PLATELET # BLD AUTO: 187 K/UL — SIGNIFICANT CHANGE UP (ref 150–400)
POTASSIUM SERPL-MCNC: 4 MMOL/L — SIGNIFICANT CHANGE UP (ref 3.5–5.3)
POTASSIUM SERPL-SCNC: 4 MMOL/L — SIGNIFICANT CHANGE UP (ref 3.5–5.3)
RBC # BLD: 5.2 M/UL — SIGNIFICANT CHANGE UP (ref 4.2–5.8)
RBC # FLD: 13.5 % — SIGNIFICANT CHANGE UP (ref 10.3–14.5)
SODIUM SERPL-SCNC: 139 MMOL/L — SIGNIFICANT CHANGE UP (ref 135–145)
WBC # BLD: 5.13 K/UL — SIGNIFICANT CHANGE UP (ref 3.8–10.5)
WBC # FLD AUTO: 5.13 K/UL — SIGNIFICANT CHANGE UP (ref 3.8–10.5)

## 2022-09-06 PROCEDURE — 93306 TTE W/DOPPLER COMPLETE: CPT | Mod: 26

## 2022-09-06 RX ADMIN — ATORVASTATIN CALCIUM 80 MILLIGRAM(S): 80 TABLET, FILM COATED ORAL at 21:50

## 2022-09-06 RX ADMIN — TAMSULOSIN HYDROCHLORIDE 0.8 MILLIGRAM(S): 0.4 CAPSULE ORAL at 21:50

## 2022-09-06 RX ADMIN — HEPARIN SODIUM 5000 UNIT(S): 5000 INJECTION INTRAVENOUS; SUBCUTANEOUS at 17:34

## 2022-09-06 RX ADMIN — AMLODIPINE BESYLATE 5 MILLIGRAM(S): 2.5 TABLET ORAL at 05:13

## 2022-09-06 RX ADMIN — Medication 81 MILLIGRAM(S): at 11:34

## 2022-09-07 ENCOUNTER — TRANSCRIPTION ENCOUNTER (OUTPATIENT)
Age: 72
End: 2022-09-07

## 2022-09-07 RX ADMIN — ATORVASTATIN CALCIUM 80 MILLIGRAM(S): 80 TABLET, FILM COATED ORAL at 21:44

## 2022-09-07 RX ADMIN — TAMSULOSIN HYDROCHLORIDE 0.8 MILLIGRAM(S): 0.4 CAPSULE ORAL at 21:44

## 2022-09-07 RX ADMIN — AMLODIPINE BESYLATE 5 MILLIGRAM(S): 2.5 TABLET ORAL at 05:22

## 2022-09-07 RX ADMIN — HEPARIN SODIUM 5000 UNIT(S): 5000 INJECTION INTRAVENOUS; SUBCUTANEOUS at 17:51

## 2022-09-07 RX ADMIN — Medication 81 MILLIGRAM(S): at 11:25

## 2022-09-07 NOTE — DISCHARGE NOTE PROVIDER - NSDCFUADDAPPT_GEN_ALL_CORE_FT
Follow up with your primary care doctor within one week of discharge. If you do not have one, you can call the medicine clinic at [phone] to make an appointment.    Follow up with Neurology, Dr. Dash, call [phone] to schedule appointment.

## 2022-09-07 NOTE — DISCHARGE NOTE PROVIDER - PROVIDER TOKENS
FREE:[LAST:[Your Primary Care Doctor],PHONE:[(   )    -],FAX:[(   )    -]],FREE:[LAST:[Your Cardiologist],PHONE:[(   )    -],FAX:[(   )    -]],PROVIDER:[TOKEN:[[id number]]]

## 2022-09-07 NOTE — DISCHARGE NOTE PROVIDER - NSDCMRMEDTOKEN_GEN_ALL_CORE_FT
alfuzosin 10 mg oral tablet, extended release: 1 tab(s) orally once a day  amLODIPine 5 mg oral tablet: 1 tab(s) orally once a day  Aspirin Enteric Coated 81 mg oral delayed release tablet: 1 tab(s) orally once a day  ramipril 5 mg oral capsule: 1 cap(s) orally once a day  rosuvastatin 40 mg oral tablet: 1 tab(s) orally once a day   alfuzosin 10 mg oral tablet, extended release: 1 tab(s) orally once a day  amLODIPine 5 mg oral tablet: 1 tab(s) orally once a day  Aspirin Enteric Coated 81 mg oral delayed release tablet: 1 tab(s) orally once a day  rosuvastatin 40 mg oral tablet: 1 tab(s) orally once a day

## 2022-09-07 NOTE — DISCHARGE NOTE PROVIDER - CARE PROVIDER_API CALL
Your Primary Care Doctor,   Phone: (   )    -  Fax: (   )    -  Follow Up Time:     Your Cardiologist,   Phone: (   )    -  Fax: (   )    -  Follow Up Time:     Aster Dash  NEUROLOGY  [address] [place] [address]  Phone: [phone]  Fax: [phone]  Follow Up Time:

## 2022-09-07 NOTE — DISCHARGE NOTE PROVIDER - NSDCCPCAREPLAN_GEN_ALL_CORE_FT
PRINCIPAL DISCHARGE DIAGNOSIS  Diagnosis: Chest pain  Assessment and Plan of Treatment: You presented to the hospital with chest pain which resolved upon arrival without intervention.  You were followed by the cardiology team - your bloodwork and EKG showed no signs of a heart attack.  Your chest xray showed clear lungs.  Your echocardiogram (ultrasound of the heart) showed normal pumping function with borderline high pressure in lungs (pulmonary hypertension).    Follow up with your primary care doctor and cardiologist within 1-2 weeks of discharge for further monitoring and management.      SECONDARY DISCHARGE DIAGNOSES  Diagnosis: Syncope  Assessment and Plan of Treatment: You reported dizziness that has been ongoing for years, associated w/ occasional neck pain, prior episodes of passing out.  A CAT Scan of your Head and Neck was unremarkable.  Your blood pressure was within normal limits.  You were followed by the Neurology team who recommended FRANCESCA Stockings to promote venous return and prevent possible episodes of blood pressure.  Per Neurology, no further inpatient workup indicated, follow up outpatient for further monitoring and management.    Diagnosis: Mild HTN  Assessment and Plan of Treatment: You have a history of high blood pressure.  Your RAMIPRIL was STOPPED due to dizziness which could be related to episodes of low blood pressure.  Your AMLODIPINE was CONTINUED.   Follow up with your primary care doctor and cardiologist within 1-2 weeks of discharge for further monitoring and management.    Diagnosis: Swelling of lower extremity  Assessment and Plan of Treatment: You report a history of swollen legs.  Your legs showed no signs of swelling on physical exam.  Your bloodwork showed no signs of clots in the body (d-dimer within normal limits).  Follow up with your primary care doctor and cardiologist within 1-2 weeks of discharge for further monitoring and management.     PRINCIPAL DISCHARGE DIAGNOSIS  Diagnosis: Chest pain  Assessment and Plan of Treatment: You presented to the hospital with chest pain which resolved upon arrival without intervention.  You were followed by the cardiology team - your bloodwork and EKG showed no signs of a heart attack.  Your chest xray showed clear lungs.  Your echocardiogram (ultrasound of the heart) showed normal pumping function with borderline high pressure in lungs (pulmonary hypertension).    Follow up with your primary care doctor and cardiologist within 1-2 weeks of discharge for further monitoring and management.      SECONDARY DISCHARGE DIAGNOSES  Diagnosis: Syncope  Assessment and Plan of Treatment: You reported dizziness that has been ongoing for years, associated w/ occasional neck pain, prior episodes of passing out.  A CAT Scan of your Head and Neck was unremarkable.  Your blood pressure was within normal limits.  You were followed by the Neurology team who recommended to wear FRANCESCA Stockings during day while awake only to promote venous return and prevent possible episodes of blood pressure.  Per Neurology, no further inpatient workup indicated, follow up outpatient for further monitoring and management.    Diagnosis: Mild HTN  Assessment and Plan of Treatment: You have a history of high blood pressure.  Your RAMIPRIL was STOPPED due to dizziness which could be related to episodes of low blood pressure.  Your AMLODIPINE was CONTINUED.   Follow up with your primary care doctor and cardiologist within 1-2 weeks of discharge for further monitoring and management.    Diagnosis: Swelling of lower extremity  Assessment and Plan of Treatment: You report a history of swollen legs.  Your legs showed no signs of swelling on physical exam.  Your bloodwork showed no signs of clots in the body (d-dimer within normal limits).  Follow up with your primary care doctor and cardiologist within 1-2 weeks of discharge for further monitoring and management.

## 2022-09-07 NOTE — DISCHARGE NOTE PROVIDER - HOSPITAL COURSE
72 M, with PMHx HTN, HLD, BPH, CAD s/p stent 6 years ago presenting with chest pressure and heaviness while walking to the bathroom; states he felt dizzy and lightheaded, had pain on the posterior aspect of neck. He reports 2-3 syncopal episodes this year already. Admitted to telemetry for r/o acs    Chest pressure  - EKG non-ischemic, Trop neg, BNP neg, COVID neg; CXR clear lungs  - Reports chest pain resolved in the ED  - Last cath 6 months ago  - TTE WNL, EF 67%, borderline pulm htn    Syncope  - Syncope unknown etiology, associated w/ occasional neck pain  - CTA H/N unremarkable   - Orthostatics borderline on admission, negative on 9/6  - can c/w amlodipine, holding Ramipril due to possible vasovagal  - Neuro following, no further inpatient workup indicated, follow up outpatient    HTN  - c/w amlodipine, hold ramipril    Swelling of lower extremity  - No swelling noted currently, D-dimer WNL    Patient seen and evaluated. Reviewed discharge medications with patient and attending. All new medications requiring new prescriptions were sent to the pharmacy of patient's choice. Reviewed need for prescription for previous home medications and new prescriptions sent if requested. Medically cleared/stable for discharge Home as per Dr. Colon on _____ with appropriate follow up. Patient understands and agrees with plan of care. 72 M, with PMHx HTN, HLD, BPH, CAD s/p stent 6 years ago presenting with chest pressure and heaviness while walking to the bathroom; states he felt dizzy and lightheaded, had pain on the posterior aspect of neck. He reports 2-3 syncopal episodes this year already. Admitted to telemetry for r/o acs    Chest pressure  - EKG non-ischemic, Trop neg, BNP neg, COVID neg; CXR clear lungs  - Reports chest pain resolved in the ED  - Last cath 6 months ago  - TTE WNL, EF 67%, borderline pulm htn    Syncope  - Syncope unknown etiology, associated w/ occasional neck pain  - CTA H/N unremarkable   - Orthostatics borderline on admission, negative on 9/6  - can c/w amlodipine, holding Ramipril due to possible vasovagal  - Neuro following, recommends FRANCESCA Stockings to wear during day while awake only; no further inpatient workup indicated, follow up outpatient    HTN  - c/w amlodipine, hold ramipril    Swelling of lower extremity  - No swelling noted currently, D-dimer WNL    Patient seen and evaluated. Reviewed discharge medications with patient and attending. All new medications requiring new prescriptions were sent to the pharmacy of patient's choice. Reviewed need for prescription for previous home medications and new prescriptions sent if requested. Medically cleared/stable for discharge Home as per Dr. Colon on _____ with appropriate follow up. Patient understands and agrees with plan of care. 72 M, with PMHx HTN, HLD, BPH, CAD s/p stent 6 years ago presenting with chest pressure and heaviness while walking to the bathroom; states he felt dizzy and lightheaded, had pain on the posterior aspect of neck. He reports 2-3 syncopal episodes this year already. Admitted to telemetry for r/o acs    Chest pressure  - EKG non-ischemic, Trop neg, BNP neg, COVID neg; CXR clear lungs  - Reports chest pain resolved in the ED  - Last cath 6 months ago  - TTE WNL, EF 67%, borderline pulm htn    Syncope  - Syncope unknown etiology, associated w/ occasional neck pain  - CTA H/N unremarkable   - Orthostatics borderline on admission, negative on 9/6  - can c/w amlodipine, holding Ramipril due to possible vasovagal  - Neuro following, recommends FRANCESCA Stockings to wear during day while awake only; no further inpatient workup indicated, follow up outpatient    HTN  - c/w amlodipine, hold ramipril    Swelling of lower extremity  - No swelling noted currently, D-dimer WNL    Patient seen and evaluated. Reviewed discharge medications with patient and attending. All new medications requiring new prescriptions were sent to the pharmacy of patient's choice. Reviewed need for prescription for previous home medications and new prescriptions sent if requested. Medically cleared/stable for discharge Home as per Dr. Colon on  9/8  with appropriate follow up. Patient understands and agrees with plan of care.

## 2022-09-08 ENCOUNTER — TRANSCRIPTION ENCOUNTER (OUTPATIENT)
Age: 72
End: 2022-09-08

## 2022-09-08 VITALS
RESPIRATION RATE: 16 BRPM | OXYGEN SATURATION: 100 % | SYSTOLIC BLOOD PRESSURE: 123 MMHG | TEMPERATURE: 98 F | DIASTOLIC BLOOD PRESSURE: 74 MMHG | HEART RATE: 74 BPM

## 2022-09-08 RX ORDER — RAMIPRIL 5 MG
1 CAPSULE ORAL
Qty: 0 | Refills: 0 | DISCHARGE

## 2022-09-08 RX ADMIN — Medication 81 MILLIGRAM(S): at 11:58

## 2022-09-08 RX ADMIN — AMLODIPINE BESYLATE 5 MILLIGRAM(S): 2.5 TABLET ORAL at 05:53

## 2022-09-08 RX ADMIN — HEPARIN SODIUM 5000 UNIT(S): 5000 INJECTION INTRAVENOUS; SUBCUTANEOUS at 05:53

## 2022-09-08 NOTE — DISCHARGE NOTE NURSING/CASE MANAGEMENT/SOCIAL WORK - NSDCPEFALRISK_GEN_ALL_CORE
For information on Fall & Injury Prevention, visit: [email or web address] OR  [email or web address] OR  https://www.cdc.gov/steadi/patient.html

## 2022-09-08 NOTE — PROGRESS NOTE ADULT - ASSESSMENT
72 year old M hx of CAD s/p stent 6 years ago, HTN, HLD, BPH, here w/ chest pressure and heaviness while walking to the bathroom.    Problem/Plan - 1:  ·  Problem: Chest pressure.   ·  Plan: -pt w/ negative troponin times 2  -last cath 6 months ago, f/u TTE results  -EKG nonischemic.    Problem/Plan - 2:  ·  Problem: Syncope.   ·  Plan: -syncope unknown etiology, associated w/ occasional neck pain  -f/u CT angio head and neck   -f/u d-dimer if elevated get duplex venous  -hold ramipril, can c/w amlodipine, if orthostatics is wnl can resume ramipril    Problem/Plan - 3:  ·  Problem: Mild HTN.   ·  Plan: -as above for syncope.    Problem/Plan - 4:  ·  Problem: BPH without urinary obstruction.   ·  Plan: -flomax 0.8 mg at bedtime  -can bladder scan Q8.    Problem/Plan - 5:  ·  Problem: Swelling of lower extremity.   ·  Plan: -no swelling noted currently, can check d-dimer  -if d-dimer elevated obtain duplex venous LE
72 year old M hx of CAD s/p stent 6 years ago, HTN, HLD, BPH, here w/ chest pressure and heaviness while walking to the bathroom.    Problem/Plan - 1:  ·  Problem: Chest pressure.   ·  Plan: -pt w/ negative troponin times 2  -last cath 6 months ago, f/u TTE results  -EKG nonischemic.    Problem/Plan - 2:  ·  Problem: Syncope.   ·  Plan: -syncope unknown etiology, associated w/ occasional neck pain  -f/u CT angio head and neck   -f/u d-dimer if elevated get duplex venous  -hold ramipril, can c/w amlodipine, if orthostatics is wnl can resume ramipril    Problem/Plan - 3:  ·  Problem: Mild HTN.   ·  Plan: -as above for syncope.    Problem/Plan - 4:  ·  Problem: BPH without urinary obstruction.   ·  Plan: -flomax 0.8 mg at bedtime  Problem/Plan - 5:  ·  Problem: Swelling of lower extremity.   ·  Plan: -no swelling noted currently, can check d-dimer  -if d-dimer elevated obtain duplex venous LE
ECHO 12/18/2018: normal LV fx, EF 64%  mri/mra 12/19/18 evidence of mild intracranial atherosclerosis and R ica stenosis of 50 %    a/p  Patient is a 72 year old M hx of CAD s/p stent 6 years ago, HTN, HLD, BPH, here w/ chest pressure and heaviness while walking to the bathroom. He took his aspirin 81 mg at 2 am, and states he also had a syncopal event times 1 while getting up     # Syncope   -history of vasovagal syncope likely triggered by neck pain   -orthostatics negative  -check echo  -prior CT angio head/neck revealed no evidence of acute CVA,  + 55% stenosis involving the prox Right ICA (known from previous MRI results)  -repeat CT angio OK  -prior mri c spine with mild spinal canal stenosis at c3-4, with foraminal stenosis R> Left  no intervention recommended per Neuro   -does pt need spinal MRI inpt?? please ask neuro to comment    # Carotid Disease  -Continue asa, plavix, statin daily. (cva PPx)   -ct angio head/ neck  revealed 55% stenosis involving the prox Right ICA    #. CAD, s/p pci, atypical chest pain  -no acs, HS trop negative   -cont asa/plavix/statin  -if neuro w/u negative will consider stress inpt vs outpt    # HTN   -bp stable   -continue with ant-htn meds  DVT ppx      if no further neuro workup planned can begin DC planning    35 minutes spent on total encounter; more than 50% of the visit was spent counseling and/or coordinating care by the attending physician.    
ECHO 12/18/2018: normal LV fx, EF 64%  mri/mra 12/19/18 evidence of mild intracranial atherosclerosis and R ica stenosis of 50 %    a/p  Patient is a 72 year old M hx of CAD s/p stent 6 years ago, HTN, HLD, BPH, here w/ chest pressure and heaviness while walking to the bathroom. He took his aspirin 81 mg at 2 am, and states he also had a syncopal event times 1 while getting up     # Syncope   -history of vasovagal syncope likely triggered by neck pain   -orthostatics negative  -check echo  -prior CT angio head/neck revealed no evidence of acute CVA,  + 55% stenosis involving the prox Right ICA (known from previous MRI results)  -repeat CT angio OK  -prior mri c spine with mild spinal canal stenosis at c3-4, with foraminal stenosis R> Left  no intervention recommended per Neuro   -does pt need spinal MRI inpt?? please ask neuro to comment    # Carotid Disease  -Continue asa, plavix, statin daily. (cva PPx)   -ct angio head/ neck  revealed 55% stenosis involving the prox Right ICA    #. CAD, s/p pci, atypical chest pain  -no acs, HS trop negative   -cont asa/plavix/statin  -if neuro w/u negative will consider stress inpt vs outpt    # HTN   -bp stable   -continue with ant-htn meds  DVT ppx      if no further neuro workup planned can begin DC planning    35 minutes spent on total encounter; more than 50% of the visit was spent counseling and/or coordinating care by the attending physician.    
ECHO 12/18/2018: normal LV fx, EF 64%  mri/mra 12/19/18 evidence of mild intracranial atherosclerosis and R ica stenosis of 50 %    a/p  Patient is a 72 year old M hx of CAD s/p stent 6 years ago, HTN, HLD, BPH, here w/ chest pressure and heaviness while walking to the bathroom. He took his aspirin 81 mg at 2 am, and states he also had a syncopal event times 1 while getting up     # Syncope   -history of vasovagal syncope likely triggered by neck pain   -orthostatics negative  -check echo  -prior CT angio head/neck revealed no evidence of acute CVA,  + 55% stenosis involving the prox Right ICA (known from previous MRI results)  -repeat CT angio OK  -prior mri c spine with mild spinal canal stenosis at c3-4, with foraminal stenosis R> Left  no intervention recommended per Neuro   -does pt need spinal MRI?? will ask neuro to comment    # Carotid Disease  -Continue asa, plavix, statin daily. (cva PPx)   -ct angio head/ neck  revealed 55% stenosis involving the prox Right ICA    #. CAD, s/p pci, atypical chest pain  -no acs, HS trop negative   -cont asa/plavix/statin  -if neuro w/u negative will consider stress inpt vs outpt    # HTN   -bp stable   -continue with ant-htn meds  DVT ppx      35 minutes spent on total encounter; more than 50% of the visit was spent counseling and/or coordinating care by the attending physician.    
ECHO 12/18/2018: normal LV fx, EF 64%  mri/mra 12/19/18 evidence of mild intracranial atherosclerosis and R ica stenosis of 50 %    a/p  Patient is a 72 year old M hx of CAD s/p stent 6 years ago, HTN, HLD, BPH, here w/ chest pressure and heaviness while walking to the bathroom. He took his aspirin 81 mg at 2 am, and states he also had a syncopal event times 1 while getting up     # Syncope   -history of vasovagal syncope likely triggered by neck pain   -orthostatics negative  -echo with normal lv and valve function  -prior CT angio head/neck revealed no evidence of acute CVA,  + 55% stenosis involving the prox Right ICA (known from previous MRI results)  -repeat CT angio OK  -prior mri c spine with mild spinal canal stenosis at c3-4, with foraminal stenosis R> Left  no intervention recommended per Neuro   -does pt need spinal MRI inpt?? please ask neuro to comment    # Carotid Disease  -Continue asa, plavix, statin daily. (cva PPx)   -ct angio head/ neck  revealed 55% stenosis involving the prox Right ICA    #. CAD, s/p pci, atypical chest pain  -no acs, HS trop negative   -cont asa/plavix/statin  -if neuro w/u negative will consider stress as inpt vs outpt    # HTN   -bp stable   -continue with ant-htn meds  DVT ppx      if no further neuro workup planned can begin DC planning    35 minutes spent on total encounter; more than 50% of the visit was spent counseling and/or coordinating care by the attending physician.

## 2022-09-08 NOTE — DISCHARGE NOTE NURSING/CASE MANAGEMENT/SOCIAL WORK - PATIENT PORTAL LINK FT
You can access the FollowMyHealth Patient Portal offered by [facility] by registering at the following website: [email or web address]. By joining [employer]’s FollowMyHealth portal, you will also be able to view your health information using other applications (apps) compatible with our system.

## 2022-09-08 NOTE — DISCHARGE NOTE NURSING/CASE MANAGEMENT/SOCIAL WORK - NSPROMEDSBROUGHTTOHOSP_GEN_A_NUR
no Vitals: HTN at 143/96, tachy at 124  Gen: AAOx3, NAD, sitting uncomfortably in stretcher, alcohol on breath   Head: ncat, perrla, eomi b/l  Neck: supple, no lymphadenopathy, no midline deviation  Heart: rrr, no m/r/g  Lungs: CTA b/l, no rales/ronchi/wheezes  Abd: soft, tender in epigastrium, non-distended, no rebound or guarding  Ext: no clubbing/cyanosis/edema  Neuro: sensation and muscle strength intact b/l, unsteady gait

## 2022-09-08 NOTE — DISCHARGE NOTE NURSING/CASE MANAGEMENT/SOCIAL WORK - NSDCFUADDAPPT_GEN_ALL_CORE_FT
Follow up with your primary care doctor within one week of discharge. If you do not have one, you can call the medicine clinic at [phone] to make an appointment.    Follow up with Neurology, Dr. Dash, call [phone] to schedule appointment.

## 2022-09-08 NOTE — PROGRESS NOTE ADULT - SUBJECTIVE AND OBJECTIVE BOX
CARDIOLOGY FOLLOW UP - Dr. Colon  Date of Service: 9/5/22  CC: no cp/sob    Review of Systems:  Constitutional: No fever, weight loss, or fatigue  Respiratory: No cough, wheezing, or hemoptysis, no shortness of breath  Cardiovascular: No chest pain, palpitations, passing out, dizziness, or leg swelling  Gastrointestinal: No abd or epigastric pain. No nausea, vomiting, or hematemesis; no diarrhea or consiptaiton, no melena or hematochezia  Vascular: No edema     TELEMETRY:    PHYSICAL EXAM:  T(C): 36.6 (09-05-22 @ 05:40), Max: 36.6 (09-05-22 @ 05:40)  HR: 86 (09-05-22 @ 05:40) (65 - 86)  BP: 151/75 (09-05-22 @ 05:40) (115/64 - 155/75)  RR: 17 (09-05-22 @ 05:40) (16 - 17)  SpO2: 100% (09-05-22 @ 05:40) (99% - 100%)  Wt(kg): --  I&O's Summary      Appearance: Normal	  Cardiovascular: Normal S1 S2,RRR, No JVD, No murmurs  Respiratory: Lungs clear to auscultation	  Gastrointestinal:  Soft, Non-tender, + BS	  Extremities: Normal range of motion, No clubbing, cyanosis or edema  Vascular: Peripheral pulses palpable 2+ bilaterally       Home Medications:  alfuzosin 10 mg oral tablet, extended release: 1 tab(s) orally once a day (04 Sep 2022 07:30)  amLODIPine 5 mg oral tablet: 1 tab(s) orally once a day (04 Sep 2022 07:30)  Aspirin Enteric Coated 81 mg oral delayed release tablet: 1 tab(s) orally once a day (04 Sep 2022 07:30)  ramipril 5 mg oral capsule: 1 cap(s) orally once a day (04 Sep 2022 07:30)  rosuvastatin 40 mg oral tablet: 1 tab(s) orally once a day (04 Sep 2022 07:30)        MEDICATIONS  (STANDING):  amLODIPine   Tablet 5 milliGRAM(s) Oral daily  aspirin enteric coated 81 milliGRAM(s) Oral daily  atorvastatin 80 milliGRAM(s) Oral at bedtime  heparin   Injectable 5000 Unit(s) SubCutaneous every 12 hours  influenza  Vaccine (HIGH DOSE) 0.7 milliLiter(s) IntraMuscular once  tamsulosin 0.8 milliGRAM(s) Oral at bedtime        EKG:  RADIOLOGY:  DIAGNOSTIC TESTING:  [ ] Echocardiogram:  [ ] Catherterization:  [ ] Stress Test:  OTHER:     LABS:	 	                          15.6   4.59  )-----------( 194      ( 05 Sep 2022 06:11 )             47.7     09-05    138  |  101  |  12  ----------------------------<  105<H>  3.4<L>   |  25  |  0.87    Ca    8.7      05 Sep 2022 06:11  Phos  2.8     09-04  Mg     2.30     09-04    TPro  6.9  /  Alb  4.1  /  TBili  0.5  /  DBili  x   /  AST  20  /  ALT  23  /  AlkPhos  59  09-04      PT/INR - ( 04 Sep 2022 10:00 )   PT: 11.6 sec;   INR: 1.00 ratio         PTT - ( 04 Sep 2022 10:00 )  PTT:43.8 sec    CARDIAC MARKERS:                  
CARDIOLOGY FOLLOW UP - Dr. Colon  Date of Service: 9/6/22  CC: no events, still complains of weakness, no cp/sob    Review of Systems:  Constitutional: No fever, weight loss, or fatigue  Respiratory: No cough, wheezing, or hemoptysis, no shortness of breath  Cardiovascular: No chest pain, palpitations, passing out, dizziness, or leg swelling  Gastrointestinal: No abd or epigastric pain. No nausea, vomiting, or hematemesis; no diarrhea or consiptaiton, no melena or hematochezia  Vascular: No edema     TELEMETRY:    PHYSICAL EXAM:  T(C): 36.8 (09-06-22 @ 05:10), Max: 36.8 (09-06-22 @ 05:10)  HR: 63 (09-06-22 @ 05:10) (63 - 82)  BP: 160/81 (09-06-22 @ 05:10) (135/66 - 160/81)  RR: 17 (09-06-22 @ 05:10) (17 - 18)  SpO2: 100% (09-06-22 @ 05:10) (99% - 100%)  Wt(kg): --  I&O's Summary      Appearance: Normal	  Cardiovascular: Normal S1 S2,RRR, No JVD, No murmurs  Respiratory: Lungs clear to auscultation	  Gastrointestinal:  Soft, Non-tender, + BS	  Extremities: Normal range of motion, No clubbing, cyanosis or edema  Vascular: Peripheral pulses palpable 2+ bilaterally       Home Medications:  alfuzosin 10 mg oral tablet, extended release: 1 tab(s) orally once a day (04 Sep 2022 07:30)  amLODIPine 5 mg oral tablet: 1 tab(s) orally once a day (04 Sep 2022 07:30)  Aspirin Enteric Coated 81 mg oral delayed release tablet: 1 tab(s) orally once a day (04 Sep 2022 07:30)  ramipril 5 mg oral capsule: 1 cap(s) orally once a day (04 Sep 2022 07:30)  rosuvastatin 40 mg oral tablet: 1 tab(s) orally once a day (04 Sep 2022 07:30)        MEDICATIONS  (STANDING):  amLODIPine   Tablet 5 milliGRAM(s) Oral daily  aspirin enteric coated 81 milliGRAM(s) Oral daily  atorvastatin 80 milliGRAM(s) Oral at bedtime  heparin   Injectable 5000 Unit(s) SubCutaneous every 12 hours  influenza  Vaccine (HIGH DOSE) 0.7 milliLiter(s) IntraMuscular once  tamsulosin 0.8 milliGRAM(s) Oral at bedtime        EKG:  RADIOLOGY:  DIAGNOSTIC TESTING:  [ ] Echocardiogram:  [ ] Catherterization:  [ ] Stress Test:  OTHER:     LABS:	 	                          16.1   5.13  )-----------( 187      ( 06 Sep 2022 05:00 )             49.4     09-06    139  |  102  |  13  ----------------------------<  89  4.0   |  26  |  0.85    Ca    8.9      06 Sep 2022 05:00            CARDIAC MARKERS:                  
CARDIOLOGY FOLLOW UP NOTE - DR. PATTERSON    Patient Name: ARLETTE MALCOLM  Date of Service: 22 @ 12:56    Patient seen and examined    Subjective:    cv: denies chest pain, dyspnea, palpitations, dizziness  pulmonary: denies cough  GI: denies abdominal pain, nausea, vomiting  vascular/legs: no edema   skin: no rash  ROS: otherwise negative   overnight events:      PHYSICAL EXAM:  T(C): 36.7 (22 @ 12:29), Max: 36.7 (22 @ 23:06)  HR: 74 (22 @ 12:29) (61 - 74)  BP: 123/74 (22 @ 12:29) (123/74 - 158/86)  RR: 16 (22 @ 12:29) (15 - 18)  SpO2: 100% (22 @ 12:29) (100% - 100%)  Wt(kg): --  I&O's Summary    Daily     Daily Weight in k.2 (08 Sep 2022 05:51)    Appearance: Normal	  Cardiovascular: Normal S1 S2,RRR, No JVD, No murmurs  Respiratory: Lungs clear to auscultation	  Gastrointestinal:  Soft, Non-tender, + BS	  Extremities: Normal range of motion, No clubbing, cyanosis or edema      Home Medications:  alfuzosin 10 mg oral tablet, extended release: 1 tab(s) orally once a day (04 Sep 2022 07:30)  amLODIPine 5 mg oral tablet: 1 tab(s) orally once a day (04 Sep 2022 07:30)  Aspirin Enteric Coated 81 mg oral delayed release tablet: 1 tab(s) orally once a day (04 Sep 2022 07:30)  rosuvastatin 40 mg oral tablet: 1 tab(s) orally once a day (04 Sep 2022 07:30)      MEDICATIONS  (STANDING):  amLODIPine   Tablet 5 milliGRAM(s) Oral daily  aspirin enteric coated 81 milliGRAM(s) Oral daily  atorvastatin 80 milliGRAM(s) Oral at bedtime  heparin   Injectable 5000 Unit(s) SubCutaneous every 12 hours  influenza  Vaccine (HIGH DOSE) 0.7 milliLiter(s) IntraMuscular once  tamsulosin 0.8 milliGRAM(s) Oral at bedtime      TELEMETRY: 	    ECG:  	  RADIOLOGY:   DIAGNOSTIC TESTING:  [ ] Echocardiogram:  [ ] Catheterization:  [ ] Stress Test:    OTHER: 	    LABS:	 	    CARDIAC MARKERS:        Troponin T, High Sensitivity Result: 11 ng/L ( @ 05:25)  Troponin T, High Sensitivity Result: 11 ng/L ( @ 02:30)                  proBNP:     Lipid Profile:   HgA1c:     Creatinine, Serum: 0.85 mg/dL (22 @ 05:00)            
[facility]      Seen earlier today Date of service   No new neurological symptoms reported. Remains stable neurologically.   - Today, patient is without complaints.          *****MEDICATIONS: Current medication reviewed and documented.    MEDICATIONS  (STANDING):  amLODIPine   Tablet 5 milliGRAM(s) Oral daily  aspirin enteric coated 81 milliGRAM(s) Oral daily  atorvastatin 80 milliGRAM(s) Oral at bedtime  heparin   Injectable 5000 Unit(s) SubCutaneous every 12 hours  influenza  Vaccine (HIGH DOSE) 0.7 milliLiter(s) IntraMuscular once  tamsulosin 0.8 milliGRAM(s) Oral at bedtime    MEDICATIONS  (PRN):  acetaminophen     Tablet .. 650 milliGRAM(s) Oral every 6 hours PRN Temp greater or equal to 38C (100.4F), Mild Pain (1 - 3), Moderate Pain (4 - 6)          ***** VITAL SIGNS:   Vital Signs Last 24 Hrs       T(F): 98 (09-08-22 @ 12:29), Max: 98 (09-07-22 @ 23:06)  HR: 74 (09-08-22 @ 12:29) (61 - 74)  BP: 123/74 (09-08-22 @ 12:29) (123/74 - 158/86)  RR: 16 (09-08-22 @ 12:29) (15 - 18)  SpO2: 100% (09-08-22 @ 12:29) (100% - 100%)  Wt(kg): --  I&O's Summary           *****PHYSICAL EXAM:   alert oriented x 3 attention comprehension are fair.  Able to name, repeat.   EOmi fundi not visualized   no nystagmus VFF to confrontation  Tongue is midline  Palate elevates symmetrically   Moving all 4 ext spontaneously no drift appreciated    Gait not assessed.            *****LAB AND IMAGING:                                         [All pertinent recent Imaging/Reports reviewed]            *****A S S E S S M E N T   A N D   P L A N :          Excerpt from H&P,"   Patient is a 72 year old M hx of CAD s/p stent 6 years ago, HTN, HLD, BPH, here w/ chest pressure and heaviness while walking to the bathroom. He took his aspirin 81 mg at 2 am, and states he also had a syncopal event times 1 while getting up. He felt dizzy and lightheaded, and had pain on the posterior aspect of his neck. He has had 2-3 syncopal episodes this year already. His chest pain resolved in the ED. He denies fevers, chills, nausea, vomiting, diarrhea, sob, headaches, visual changes, palpitations, has had occasional left LE edema (not currently), denies dysuria, polyuria, melena, hematochezia.     ED course: troponin negative times 2, EKG no acute ST elevation or pathologic Q waves.        Problem/Recommendations 1:    syncope of unclear eitology   r/o orthostatic hypotension   inital orthostatic borderline   compression stockings       b12 folate tsh   improved dizziness     Problem/Recommendations 2: neck pain   likley related to fall vs orthostatic bp   pt with prior mr showing mild canal stenosis   cta without any sig carotid/vertebral stenosis        Thank you for allowing me to participate in the care of this patient. Will continue to follow patient periodically. Please do not hesitate to call me if you have any  questions or if there has been a change in patients neurological status     ________________  Aster Dash MD  [facility] ([facility]  [phone]      33 minutes spent on total encounter; more than 50 % of the visit was  spent counseling about plan of care, compliance to diet/exercise and medication regimen and or  coordinating care by the attending physician.      It is advised that stroke patients follow up with REYNA Carey @ [phone] in 1- 2 weeks.   Others please follow up with Dr. Michael Nissenbaum [phone]
Patient is a 72y old  Male who presents with a chief complaint of   Date of servie : 22 @ 15:14  INTERVAL HPI/OVERNIGHT EVENTS:  T(C): 36.3 (22 @ 12:21), Max: 36.6 (22 @ 05:40)  HR: 82 (22 @ 12:21) (65 - 86)  BP: 135/66 (22 @ 12:21) (135/66 - 155/75)  RR: 18 (22 @ 12:21) (16 - 18)  SpO2: 99% (22 @ 12:21) (99% - 100%)  Wt(kg): --  I&O's Summary      LABS:                        15.6   4.59  )-----------( 194      ( 05 Sep 2022 06:11 )             47.7         138  |  101  |  12  ----------------------------<  105<H>  3.4<L>   |  25  |  0.87    Ca    8.7      05 Sep 2022 06:11  Phos  2.8     -  Mg     2.30     -    TPro  6.9  /  Alb  4.1  /  TBili  0.5  /  DBili  x   /  AST  20  /  ALT  23  /  AlkPhos  59  09-04    PT/INR - ( 04 Sep 2022 10:00 )   PT: 11.6 sec;   INR: 1.00 ratio         PTT - ( 04 Sep 2022 10:00 )  PTT:43.8 sec  Urinalysis Basic - ( 04 Sep 2022 22:50 )    Color: Light Yellow / Appearance: Clear / S.020 / pH: x  Gluc: x / Ketone: Negative  / Bili: Negative / Urobili: <2 mg/dL   Blood: x / Protein: Negative / Nitrite: Negative   Leuk Esterase: Negative / RBC: 0 /HPF / WBC 1 /HPF   Sq Epi: x / Non Sq Epi: 0 /HPF / Bacteria: Negative      CAPILLARY BLOOD GLUCOSE            Urinalysis Basic - ( 04 Sep 2022 22:50 )    Color: Light Yellow / Appearance: Clear / S.020 / pH: x  Gluc: x / Ketone: Negative  / Bili: Negative / Urobili: <2 mg/dL   Blood: x / Protein: Negative / Nitrite: Negative   Leuk Esterase: Negative / RBC: 0 /HPF / WBC 1 /HPF   Sq Epi: x / Non Sq Epi: 0 /HPF / Bacteria: Negative        MEDICATIONS  (STANDING):  amLODIPine   Tablet 5 milliGRAM(s) Oral daily  aspirin enteric coated 81 milliGRAM(s) Oral daily  atorvastatin 80 milliGRAM(s) Oral at bedtime  heparin   Injectable 5000 Unit(s) SubCutaneous every 12 hours  influenza  Vaccine (HIGH DOSE) 0.7 milliLiter(s) IntraMuscular once  tamsulosin 0.8 milliGRAM(s) Oral at bedtime    MEDICATIONS  (PRN):  acetaminophen     Tablet .. 650 milliGRAM(s) Oral every 6 hours PRN Temp greater or equal to 38C (100.4F), Mild Pain (1 - 3), Moderate Pain (4 - 6)          PHYSICAL EXAM:  GENERAL: NAD, well-groomed, well-developed  HEAD:  Atraumatic, Normocephalic  CHEST/LUNG: Clear to percussion bilaterally; No rales, rhonchi, wheezing, or rubs  HEART: Regular rate and rhythm; No murmurs, rubs, or gallops  ABDOMEN: Soft, Nontender, Nondistended; Bowel sounds present  EXTREMITIES:  2+ Peripheral Pulses, No clubbing, cyanosis, or edema  LYMPH: No lymphadenopathy noted  SKIN: No rashes or lesions    Care Discussed with Consultants/Other Providers [ ] YES  [ ] NO
CARDIOLOGY FOLLOW UP - Dr. Colon  Date of Service: 9/7/2022  CC: feeling better, still dizzy    Review of Systems:  Constitutional: No fever, weight loss, or fatigue  Respiratory: No cough, wheezing, or hemoptysis, no shortness of breath  Cardiovascular: No chest pain, palpitations, passing out, dizziness, or leg swelling  Gastrointestinal: No abd or epigastric pain. No nausea, vomiting, or hematemesis; no diarrhea or consiptaiton, no melena or hematochezia  Vascular: No edema     TELEMETRY:    PHYSICAL EXAM:  T(C): 36.6 (09-07-22 @ 11:25), Max: 36.8 (09-07-22 @ 05:20)  HR: 87 (09-07-22 @ 11:25) (64 - 87)  BP: 133/64 (09-07-22 @ 11:25) (133/64 - 168/89)  RR: 18 (09-07-22 @ 11:25) (17 - 18)  SpO2: 100% (09-07-22 @ 11:25) (98% - 100%)  Wt(kg): --  I&O's Summary      Appearance: Normal	  Cardiovascular: Normal S1 S2,RRR, No JVD, No murmurs  Respiratory: Lungs clear to auscultation	  Gastrointestinal:  Soft, Non-tender, + BS	  Extremities: Normal range of motion, No clubbing, cyanosis or edema  Vascular: Peripheral pulses palpable 2+ bilaterally       Home Medications:  alfuzosin 10 mg oral tablet, extended release: 1 tab(s) orally once a day (04 Sep 2022 07:30)  amLODIPine 5 mg oral tablet: 1 tab(s) orally once a day (04 Sep 2022 07:30)  Aspirin Enteric Coated 81 mg oral delayed release tablet: 1 tab(s) orally once a day (04 Sep 2022 07:30)  ramipril 5 mg oral capsule: 1 cap(s) orally once a day (04 Sep 2022 07:30)  rosuvastatin 40 mg oral tablet: 1 tab(s) orally once a day (04 Sep 2022 07:30)        MEDICATIONS  (STANDING):  amLODIPine   Tablet 5 milliGRAM(s) Oral daily  aspirin enteric coated 81 milliGRAM(s) Oral daily  atorvastatin 80 milliGRAM(s) Oral at bedtime  heparin   Injectable 5000 Unit(s) SubCutaneous every 12 hours  influenza  Vaccine (HIGH DOSE) 0.7 milliLiter(s) IntraMuscular once  tamsulosin 0.8 milliGRAM(s) Oral at bedtime        EKG:  RADIOLOGY:  DIAGNOSTIC TESTING:  [ ] Echocardiogram:  [ ] Catherterization:  [ ] Stress Test:  OTHER:     LABS:	 	                          16.1   5.13  )-----------( 187      ( 06 Sep 2022 05:00 )             49.4     09-06    139  |  102  |  13  ----------------------------<  89  4.0   |  26  |  0.85    Ca    8.9      06 Sep 2022 05:00            CARDIAC MARKERS:                  
[facility]      Seen earlier today Date of service   No new neurological symptoms reported. Remains stable neurologically.   - Today, patient is without complaints.          *****MEDICATIONS: Current medication reviewed and documented.    MEDICATIONS  (STANDING):  amLODIPine   Tablet 5 milliGRAM(s) Oral daily  aspirin enteric coated 81 milliGRAM(s) Oral daily  atorvastatin 80 milliGRAM(s) Oral at bedtime  heparin   Injectable 5000 Unit(s) SubCutaneous every 12 hours  influenza  Vaccine (HIGH DOSE) 0.7 milliLiter(s) IntraMuscular once  tamsulosin 0.8 milliGRAM(s) Oral at bedtime    MEDICATIONS  (PRN):  acetaminophen     Tablet .. 650 milliGRAM(s) Oral every 6 hours PRN Temp greater or equal to 38C (100.4F), Mild Pain (1 - 3), Moderate Pain (4 - 6)          ***** VITAL SIGNS:   Vital Signs Last 24 Hrs       T(F): 97.8 (22 @ 11:25), Max: 98.3 (22 @ 05:20)  HR: 87 (22 @ 11:25) (64 - 87)  BP: 133/64 (22 @ 11:25) (133/64 - 168/89)  RR: 18 (22 @ 11:25) (17 - 18)  SpO2: 100% (22 @ 11:25) (98% - 100%)  Wt(kg): --  I&O's Summary           *****PHYSICAL EXAM:   alert oriented x 3 attention comprehension are fair.  Able to name, repeat.   EOmi fundi not visualized   no nystagmus VFF to confrontation  Tongue is midline  Palate elevates symmetrically   Moving all 4 ext spontaneously no drift appreciated    Gait not assessed.            *****LAB AND IMAGIN.1   5.13  )-----------( 187      ( 06 Sep 2022 05:00 )             49.4               -    139  |  102  |  13  ----------------------------<  89  4.0   |  26  |  0.85    Ca    8.9      06 Sep 2022 05:00                           [All pertinent recent Imaging/Reports reviewed]            *****A S S E S S M E N T   A N D   P L A N :        Excerpt from H&P,"   Patient is a 72 year old M hx of CAD s/p stent 6 years ago, HTN, HLD, BPH, here w/ chest pressure and heaviness while walking to the bathroom. He took his aspirin 81 mg at 2 am, and states he also had a syncopal event times 1 while getting up. He felt dizzy and lightheaded, and had pain on the posterior aspect of his neck. He has had 2-3 syncopal episodes this year already. His chest pain resolved in the ED. He denies fevers, chills, nausea, vomiting, diarrhea, sob, headaches, visual changes, palpitations, has had occasional left LE edema (not currently), denies dysuria, polyuria, melena, hematochezia.     ED course: troponin negative times 2, EKG no acute ST elevation or pathologic Q waves.        Problem/Recommendations 1:    syncope of unclear eitology   r/o orthostatic hypotension   inital orthostatic borderline   compression stockings       b12 folate tsh   improved dizziness     Problem/Recommendations 2: neck pain   likley related to fall vs orthostatic bp   pt with prior mr showing mild canal stenosis   cta without any sig carotid/vertebral stenosis     ___________________________      Thank you for allowing me to participate in the care of this patient. Will continue to follow patient periodically. Please do not hesitate to call me if you have any  questions or if there has been a change in patients neurological status     ________________  Aster Dash MD  [facility] ([facility]  [phone]      33 minutes spent on total encounter; more than 50 % of the visit was  spent counseling about plan of care, compliance to diet/exercise and medication regimen and or  coordinating care by the attending physician.      It is advised that stroke patients follow up with REYNA Carey @ [phone] in 1- 2 weeks.   Others please follow up with Dr. Michael Nissenbaum [phone]
[facility]      Seen earlier today Date of service   No new neurological symptoms reported. Remains stable neurologically.   - Today, patient is without complaints.          *****MEDICATIONS: Current medication reviewed and documented.    MEDICATIONS  (STANDING):  amLODIPine   Tablet 5 milliGRAM(s) Oral daily  aspirin enteric coated 81 milliGRAM(s) Oral daily  atorvastatin 80 milliGRAM(s) Oral at bedtime  heparin   Injectable 5000 Unit(s) SubCutaneous every 12 hours  influenza  Vaccine (HIGH DOSE) 0.7 milliLiter(s) IntraMuscular once  tamsulosin 0.8 milliGRAM(s) Oral at bedtime    MEDICATIONS  (PRN):  acetaminophen     Tablet .. 650 milliGRAM(s) Oral every 6 hours PRN Temp greater or equal to 38C (100.4F), Mild Pain (1 - 3), Moderate Pain (4 - 6)          ***** VITAL SIGNS:   Vital Signs Last 24 Hrs       T(F): 97.6 (09-08-22 @ 05:51), Max: 98 (09-07-22 @ 23:06)  HR: 61 (09-08-22 @ 05:51) (61 - 87)  BP: 148/85 (09-08-22 @ 05:51) (128/73 - 158/86)  RR: 15 (09-08-22 @ 05:51) (15 - 18)  SpO2: 100% (09-08-22 @ 05:51) (100% - 100%)  Wt(kg): --  I&O's Summary           *****PHYSICAL EXAM:   alert oriented x 3 attention comprehension are fair.  Able to name, repeat.   EOmi fundi not visualized   no nystagmus VFF to confrontation  Tongue is midline  Palate elevates symmetrically   Moving all 4 ext spontaneously no drift appreciated    Gait not assessed.            *****LAB AND IMAGING:                                         [All pertinent recent Imaging/Reports reviewed]            *****A S S E S S M E N T   A N D   P L A N :          Excerpt from H&P,"   Patient is a 72 year old M hx of CAD s/p stent 6 years ago, HTN, HLD, BPH, here w/ chest pressure and heaviness while walking to the bathroom. He took his aspirin 81 mg at 2 am, and states he also had a syncopal event times 1 while getting up. He felt dizzy and lightheaded, and had pain on the posterior aspect of his neck. He has had 2-3 syncopal episodes this year already. His chest pain resolved in the ED. He denies fevers, chills, nausea, vomiting, diarrhea, sob, headaches, visual changes, palpitations, has had occasional left LE edema (not currently), denies dysuria, polyuria, melena, hematochezia.     ED course: troponin negative times 2, EKG no acute ST elevation or pathologic Q waves.        Problem/Recommendations 1:    syncope of unclear eitology   r/o orthostatic hypotension   inital orthostatic borderline   compression stockings       b12 folate tsh   improved dizziness     Problem/Recommendations 2: neck pain   likley related to fall vs orthostatic bp   pt with prior mr showing mild canal stenosis   cta without any sig carotid/vertebral stenosis          Thank you for allowing me to participate in the care of this patient. Will continue to follow patient periodically. Please do not hesitate to call me if you have any  questions or if there has been a change in patients neurological status     ________________  Aster Dash MD  [facility] ([facility]  [phone]      33 minutes spent on total encounter; more than 50 % of the visit was  spent counseling about plan of care, compliance to diet/exercise and medication regimen and or  coordinating care by the attending physician.      It is advised that stroke patients follow up with REYNA Carey @ [phone] in 1- 2 weeks.   Others please follow up with Dr. Michael Nissenbaum [phone]
Patient is a 72y old  Male who presents with a chief complaint of   Date of servie : 22 @ 19:35  INTERVAL HPI/OVERNIGHT EVENTS:  T(C): 36.7 (22 @ 12:36), Max: 36.8 (22 @ 05:10)  HR: 91 (22 @ 12:36) (63 - 91)  BP: 150/69 (22 @ 12:36) (147/75 - 160/81)  RR: 19 (22 @ 12:36) (17 - 19)  SpO2: 100% (22 @ 12:36) (100% - 100%)  Wt(kg): --  I&O's Summary      LABS:                        16.1   5.13  )-----------( 187      ( 06 Sep 2022 05:00 )             49.4         139  |  102  |  13  ----------------------------<  89  4.0   |  26  |  0.85    Ca    8.9      06 Sep 2022 05:00        Urinalysis Basic - ( 04 Sep 2022 22:50 )    Color: Light Yellow / Appearance: Clear / S.020 / pH: x  Gluc: x / Ketone: Negative  / Bili: Negative / Urobili: <2 mg/dL   Blood: x / Protein: Negative / Nitrite: Negative   Leuk Esterase: Negative / RBC: 0 /HPF / WBC 1 /HPF   Sq Epi: x / Non Sq Epi: 0 /HPF / Bacteria: Negative      CAPILLARY BLOOD GLUCOSE            Urinalysis Basic - ( 04 Sep 2022 22:50 )    Color: Light Yellow / Appearance: Clear / S.020 / pH: x  Gluc: x / Ketone: Negative  / Bili: Negative / Urobili: <2 mg/dL   Blood: x / Protein: Negative / Nitrite: Negative   Leuk Esterase: Negative / RBC: 0 /HPF / WBC 1 /HPF   Sq Epi: x / Non Sq Epi: 0 /HPF / Bacteria: Negative        MEDICATIONS  (STANDING):  amLODIPine   Tablet 5 milliGRAM(s) Oral daily  aspirin enteric coated 81 milliGRAM(s) Oral daily  atorvastatin 80 milliGRAM(s) Oral at bedtime  heparin   Injectable 5000 Unit(s) SubCutaneous every 12 hours  influenza  Vaccine (HIGH DOSE) 0.7 milliLiter(s) IntraMuscular once  tamsulosin 0.8 milliGRAM(s) Oral at bedtime    MEDICATIONS  (PRN):  acetaminophen     Tablet .. 650 milliGRAM(s) Oral every 6 hours PRN Temp greater or equal to 38C (100.4F), Mild Pain (1 - 3), Moderate Pain (4 - 6)          PHYSICAL EXAM:  GENERAL: NAD, well-groomed, well-developed  HEAD:  Atraumatic, Normocephalic  CHEST/LUNG: Clear to percussion bilaterally; No rales, rhonchi, wheezing, or rubs  HEART: Regular rate and rhythm; No murmurs, rubs, or gallops  ABDOMEN: Soft, Nontender, Nondistended; Bowel sounds present  EXTREMITIES:  2+ Peripheral Pulses, No clubbing, cyanosis, or edema  LYMPH: No lymphadenopathy noted  SKIN: No rashes or lesions    Care Discussed with Consultants/Other Providers [ ] YES  [ ] NO

## 2023-03-30 NOTE — PATIENT PROFILE ADULT - NSPROGENANESREACTION_GEN_A_NUR
Detail Level: Detailed Quality 431: Preventive Care And Screening: Unhealthy Alcohol Use - Screening: Patient not identified as an unhealthy alcohol user when screened for unhealthy alcohol use using a systematic screening method no previous reaction

## 2023-06-29 NOTE — H&P PST ADULT - GUM GEN PE MLT EXAM PC
not examined Information: Selecting Yes will display possible errors in your note based on the variables you have selected. This validation is only offered as a suggestion for you. PLEASE NOTE THAT THE VALIDATION TEXT WILL BE REMOVED WHEN YOU FINALIZE YOUR NOTE. IF YOU WANT TO FAX A PRELIMINARY NOTE YOU WILL NEED TO TOGGLE THIS TO 'NO' IF YOU DO NOT WANT IT IN YOUR FAXED NOTE.

## 2023-07-21 ENCOUNTER — NON-APPOINTMENT (OUTPATIENT)
Age: 73
End: 2023-07-21

## 2023-08-09 ENCOUNTER — APPOINTMENT (OUTPATIENT)
Dept: UROLOGY | Facility: CLINIC | Age: 73
End: 2023-08-09
Payer: MEDICARE

## 2023-08-09 VITALS — HEART RATE: 72 BPM | SYSTOLIC BLOOD PRESSURE: 159 MMHG | DIASTOLIC BLOOD PRESSURE: 65 MMHG

## 2023-08-09 PROCEDURE — 99214 OFFICE O/P EST MOD 30 MIN: CPT

## 2023-08-09 RX ORDER — ALFUZOSIN HYDROCHLORIDE 10 MG/1
10 TABLET, EXTENDED RELEASE ORAL
Qty: 90 | Refills: 3 | Status: DISCONTINUED | COMMUNITY
Start: 2019-10-15 | End: 2023-08-09

## 2023-08-09 RX ORDER — METOPROLOL SUCCINATE 200 MG/1
TABLET, EXTENDED RELEASE ORAL
Refills: 0 | Status: ACTIVE | COMMUNITY

## 2023-08-09 NOTE — ASSESSMENT
[FreeTextEntry1] : BPH: Continue alfuzosin 10 mg every other day.  PVR 0 mL  Urinary frequency: recommend trial of oxybutynin ER 5 mg once daily.  Goals of medication reviewed.  Discussed the potential adverse effects of the medication.  Discussed the proper administration of the medication.

## 2023-08-09 NOTE — HISTORY OF PRESENT ILLNESS
[FreeTextEntry1] : Returns for follow up. Last seen in November 2021.  Currently on alfuzosin 10 mg every other day. Previously on daily dose but felt that the medication was causing dizziness. With every other day, less adverse effects.  Primary complaint is urinary frequency.  Daytime q2-3 hours, nocturia every hour.  Flow is okay.  No straining or hesitancy.  No dysuria, no gross hematuria.  Has occasional urge incontinence. Bowel movements are normal.  Also have significant lower back pain which is affecting his ambulation. Feels that he is having weakness in the legs. Had plain film xray but has not yet seen orthopedics/spine physician.

## 2023-08-11 LAB
APPEARANCE: CLEAR
BACTERIA: NEGATIVE /HPF
BILIRUBIN URINE: NEGATIVE
BLOOD URINE: NEGATIVE
CAST: 6 /LPF
COLOR: NORMAL
EPITHELIAL CELLS: 2 /HPF
GLUCOSE QUALITATIVE U: NEGATIVE MG/DL
HYALINE CASTS: PRESENT
KETONES URINE: ABNORMAL MG/DL
LEUKOCYTE ESTERASE URINE: ABNORMAL
MICROSCOPIC-UA: NORMAL
MUCUS: PRESENT
NITRITE URINE: NEGATIVE
PH URINE: 5.5
PROTEIN URINE: 30 MG/DL
RED BLOOD CELLS URINE: 0 /HPF
REVIEW: NORMAL
SPECIFIC GRAVITY URINE: 1.02
UROBILINOGEN URINE: 0.2 MG/DL
WHITE BLOOD CELLS URINE: 2 /HPF

## 2023-08-18 NOTE — PATIENT PROFILE ADULT - NSSTREETDRUGUSE_GEN_A_NUR
[Acceptance and Commitment Therapy] : Acceptance and Commitment Therapy  [[place] Therapy] : [place] Therapy  [Motivational Interviewing] : Motivational Interviewing  [Psychoeducation] : Psychoeducation  [Supportive Therapy] : Supportive Therapy [FreeTextEntry2] : Goal (In patient's words): "I want to maintain my mental and physical health." - Objective A: We will discuss coping skills (x1 each session) to support Pt in engaging in new activities (x1-2, monthly).   - Objective B: We will discuss and review life events (x1-3) to identify thoughts and feelings to improve self-awareness and increase insight.  - Objective C: Maintain medication management and attend scheduled appointments.  Recommended Frequency of Visits - Medication Management: every 3 months - Individual Therapy: every 1 months.  [de-identified] :  Channing confirmed taking medications as prescribed and attending all scheduled appointments. Channing reported doing well and had no significant concerns. We continued further exploring his treatment goals and individual therapy expectations, and the therapist supported the patient in elaborating his interest in improving his "vitality." He talked about maintaining physical and mental health. We will explore various activities and their positive impact on his health. When noticing barriers to his engagement in those activities, we will explore coping skills to resolve conflict. We completed the treatment plan in today's session.  BH Plan/Practice Task(s): - Explore Jew activities for August and September  Skills Training:  - Self-monitoring skills: Noticing limitations and problematic behaviors through self-reflection and reports by others. - "Maintain daily healthy routine" - Envision changes that he wants to attain within 1 year.  Recommended services & referrals made: - [facility]: Pt declined 6/8/2023 - F/U with PCP for annual physical for 2023 & thyroid issue: Pt declined 6/16/2023  Support Network (established contact consent/PHI Release): - Ramiro Storm, father, [phone]   Follow-up: - Return in 4 weeks  unknown

## 2023-09-02 NOTE — H&P ADULT - PROBLEM/PLAN-6
Patient ID: Jaren Julian is a 9 y.o. male.    Chief Complaint: Rash (Entered automatically based on patient selection in Patient Portal.)    The patient initiated a request through [facility] on 9/2/2023 for evaluation and management with a chief complaint of Rash (Entered automatically based on patient selection in Patient Portal.)     I evaluated the questionnaire submission on 9/2/2023.    Ohs Peq Evisit Rash    9/2/2023  9:33 AM CDT - Filed by Ruchi Julian (Proxy)   Do you agree to participate in an E-Visit? Yes   If you have any of the following symptoms, please present to your local ER or call 911:  I acknowledge   What is the main issue that you would like for your doctor to address today? Rash   Are you able to take your vital signs? No   How would you describe your skin problem? Rash   When did your symptoms first appear? 8/29/2023   Where is it located?  Chest;  Back   Does it itch? Yes   Does it hurt? No   Is there discharge or drainage? No   Is there bleeding? No   Describe the character Spots   Describe the color Pink   Has it changed over time? Spread to other locations   Frequency of skin problem Always there   Duration of the skin problem (how long does it stay when it is present) Never goes away   I have had a new exposure to No new exposures   What have you used to treat the skin problem? Benadryl   If you have used anything for treatment, has it helped the symptoms? Yes   Other generalized symptoms that you associate with the rash No other symptoms   Provide any information you feel is important to your history not asked above None   At least one photo is required for treatment to be provided. You can upload a maximum of three photos of the affected area.           Recent Labs Obtained:  No visits with results within 7 Day(s) from this visit.   Latest known visit with results is:   Office Visit on 12/06/2020   Component Date Value Ref Range Status    POC Rapid COVID 12/06/2020 Negative   Negative Final     Acceptable 12/06/2020 Yes   Final       Encounter Diagnosis   Name Primary?    Urticaria Yes        No orders of the defined types were placed in this encounter.       OTC antihistamine and anti-itch creams      No follow-ups on file.      E-Visit Time Tracking:    Day 1 Time (in minutes): 5     Total Time (in minutes): 5                DISPLAY PLAN FREE TEXT

## 2023-10-04 NOTE — H&P CARDIOLOGY - CONSTITUTIONAL DETAILS
Date  10/04/2023 Action  New Bag Dose  25 mL/hr Rate  25 mL/hr Route  IntraVENous Administered By  Bay Aleman RN        CISplatin (PLATINOL) 62 mg in sodium chloride 0.9 % 500 mL chemo IVPB Admin Date  10/04/2023 Action  New Bag Dose  62 mg Rate  612 mL/hr Route  IntraVENous Administered By  Bay Aleman RN        dexamethasone (DECADRON) 12 mg in sodium chloride 0.9 % 50 mL IVPB Admin Date  10/04/2023 Action  New Bag Dose  12 mg Rate  200 mL/hr Route  IntraVENous Administered By  Bay Aleman RN        fosaprepitant (EMEND) 150 mg in sodium chloride 0.9 % 150 mL IVPB Admin Date  10/04/2023 Action  New Bag Dose  150 mg Rate  450 mL/hr Route  IntraVENous Administered By  Bay Aleman RN        palonosetron (ALOXI) injection 0.25 mg Admin Date  10/04/2023 Action  Given Dose  0.25 mg Rate   Route  IntraVENous Administered By  Bay Aleman RN        potassium chloride 10 mEq, magnesium sulfate 1,000 mg in sodium chloride 0.9 % 1,000 mL IVPB Admin Date  10/04/2023 Action  New Bag Dose   Rate  1,000 mL/hr Route  IntraVENous Administered By  Bay Aleman RN        sodium chloride flush 0.9 % injection 5-40 mL Admin Date  10/04/2023 Action  Given Dose  20 mL Rate   Route  IntraVENous Administered By  Bay Aleman RN             Two nurses verified prior to administering:    Drug name  Drug dose  Infusion volume or drug volume when prepared in a syringe  Rate of administration  Route of administration  Expiration dates and/or times  Appearance and physical integrity of the drugs  Rate set on infusion pump, when used  Sequencing of drug administration        Ms. Rolando Barrow tolerated treatment well and was discharged from [address] [place] [address] in stable condition. Port de-accessed, flushed per protocol. She is to return on  October 11, 2023 at 0900 for her next appointment.     Bay Aleman RN  October 4, 2023 well-groomed/well-developed/well-nourished/no distress

## 2023-10-19 NOTE — ASSESSMENT
[FreeTextEntry1] : BPH: continue alfuzosin 10 mg.  Advised to take the medication daily.\par Goals of medication reviewed.  Discussed the potential adverse effects of the medication.  Discussed the proper administration of the medication.\par \par Labs reviewed, PSA Jan 2021 was 0.15 ng/mL\par \par Follow up in 6 months. ,

## 2023-11-12 NOTE — PROGRESS NOTE ADULT - PROVIDER SPECIALTY LIST ADULT
Cardiology
Internal Medicine
Internal Medicine
Neurology
Neurology
12-Nov-2023 22:29

## 2023-12-11 ENCOUNTER — APPOINTMENT (OUTPATIENT)
Dept: CV DIAGNOSTICS | Facility: HOSPITAL | Age: 73
End: 2023-12-11

## 2023-12-20 ENCOUNTER — APPOINTMENT (OUTPATIENT)
Dept: UROLOGY | Facility: CLINIC | Age: 73
End: 2023-12-20
Payer: MEDICARE

## 2023-12-20 VITALS
BODY MASS INDEX: 21.19 KG/M2 | TEMPERATURE: 98 F | DIASTOLIC BLOOD PRESSURE: 71 MMHG | WEIGHT: 135 LBS | HEART RATE: 86 BPM | SYSTOLIC BLOOD PRESSURE: 136 MMHG | HEIGHT: 67 IN | RESPIRATION RATE: 17 BRPM

## 2023-12-20 DIAGNOSIS — R35.1 BENIGN PROSTATIC HYPERPLASIA WITH LOWER URINARY TRACT SYMPMS: ICD-10-CM

## 2023-12-20 DIAGNOSIS — N40.1 BENIGN PROSTATIC HYPERPLASIA WITH LOWER URINARY TRACT SYMPMS: ICD-10-CM

## 2023-12-20 DIAGNOSIS — R35.0 FREQUENCY OF MICTURITION: ICD-10-CM

## 2023-12-20 PROCEDURE — 99214 OFFICE O/P EST MOD 30 MIN: CPT

## 2023-12-20 RX ORDER — MIRABEGRON 25 MG/1
25 TABLET, FILM COATED, EXTENDED RELEASE ORAL
Qty: 90 | Refills: 3 | Status: ACTIVE | COMMUNITY
Start: 2023-12-20 | End: 1900-01-01

## 2023-12-20 RX ORDER — OXYBUTYNIN CHLORIDE 5 MG/1
5 TABLET, EXTENDED RELEASE ORAL
Qty: 90 | Refills: 3 | Status: DISCONTINUED | COMMUNITY
Start: 2023-08-09 | End: 2023-12-20

## 2023-12-20 RX ORDER — MIRABEGRON 25 MG/1
25 TABLET, FILM COATED, EXTENDED RELEASE ORAL
Qty: 30 | Refills: 4 | Status: DISCONTINUED | COMMUNITY
Start: 2023-12-20 | End: 2023-12-20

## 2023-12-20 NOTE — H&P ADULT - GASTROINTESTINAL DETAILS
- presented with left sided weakness and slurred speech.  Initial NIH 18.  - stroke RF: AFib (on Xarelto), HTN, HLD, CAD, obesity, former smoker  - intervention:  Underwent successful Right ICA thrombectomy (TICI 3).  - etiology: concern for cardioembolic    Stroke workup:  -CTh (12/19/23 1121):  No acute intracranial findings or significant interval change compared to May 2023.  -CTA h/n (12/19/23  1146):   1.  Irregular appearance of the right internal carotid artery near the skull base and petrous portion.  This is of uncertain etiology.  This could be related to soft atheromatous plaque, ill-defined dissection flap or artifact.  2.  Poor enhancement of the right anterior circulation including the MCA and HÉCTOR on arterial phase.  There is delayed enhancement of the right HÉCTOR and MCA seen on delayed phase postcontrast imaging suggesting upstream/inflow abnormality.  3.  Very subtle asymmetric loss of gray-white differentiation in the right cerebral hemisphere most pronounced at the frontal lobe and basal ganglia.  No appreciable hemorrhage.  -ECHO: EF 50-55%, bubble study negative, mildly dilated LA  -CUS: bilateral ICA less than 50% stenosis  -LDL: 70  -A1c: 5.6  -TSH: 1.246  -home medications include ASA 81mg daily, Atorvastatin 40mg daily, and Xarelto 20mg daily  -CTh (12/20/23 0905):  Worsening exam with development of 1.6 cm of right to left midline shift.  -CTh (12/21/23 0322):  1. Postoperative changes following right-sided craniectomy.  2. Evolving ischemic changes in the right cerebral hemisphere with interval development of hemorrhagic transformation.  3. Interval improvement of mass effect.      Plan:  -hypertonic saline per neurosurgery   -continue Keppra 1000mg BID  -will discuss ASA with MD during rounds .... has ASA 300mg MS daily ordered, but interval development of hemorrhagic transformation noted on CTh  -will likely need to wait 14 days to resume anticoagulation, plan to repeat CTh prior to  resuming anticoagulation.    Further recommendations to follow by MD.           bowel sounds normal/nontender/no distention/normal/soft/no guarding/no rebound tenderness

## 2023-12-21 LAB
APPEARANCE: CLEAR
BACTERIA UR CULT: NORMAL
BACTERIA: NEGATIVE /HPF
BILIRUBIN URINE: ABNORMAL
BLOOD URINE: NEGATIVE
CAST: 8 /LPF
COLOR: ABNORMAL
EPITHELIAL CELLS: 2 /HPF
GLUCOSE QUALITATIVE U: NEGATIVE
HYALINE CASTS: PRESENT
KETONES URINE: NORMAL
LEUKOCYTE ESTERASE URINE: NEGATIVE
MICROSCOPIC-UA: NORMAL
MUCUS: PRESENT
NITRITE URINE: POSITIVE
PH URINE: 5.5
PROTEIN URINE: ABNORMAL
RED BLOOD CELLS URINE: 1 /HPF
REVIEW: NORMAL
SPECIFIC GRAVITY URINE: >=1.03
UROBILINOGEN URINE: NORMAL
WHITE BLOOD CELLS URINE: 1 /HPF

## 2023-12-22 ENCOUNTER — APPOINTMENT (OUTPATIENT)
Dept: CV DIAGNOSITCS | Facility: HOSPITAL | Age: 73
End: 2023-12-22

## 2023-12-22 ENCOUNTER — OUTPATIENT (OUTPATIENT)
Dept: OUTPATIENT SERVICES | Facility: HOSPITAL | Age: 73
LOS: 1 days | End: 2023-12-22
Payer: MEDICARE

## 2023-12-22 DIAGNOSIS — R07.9 CHEST PAIN, UNSPECIFIED: ICD-10-CM

## 2023-12-22 DIAGNOSIS — Z95.5 PRESENCE OF CORONARY ANGIOPLASTY IMPLANT AND GRAFT: Chronic | ICD-10-CM

## 2023-12-22 DIAGNOSIS — R06.02 SHORTNESS OF BREATH: ICD-10-CM

## 2023-12-22 DIAGNOSIS — Z98.890 OTHER SPECIFIED POSTPROCEDURAL STATES: Chronic | ICD-10-CM

## 2023-12-22 PROCEDURE — 93306 TTE W/DOPPLER COMPLETE: CPT | Mod: 26

## 2023-12-26 ENCOUNTER — NON-APPOINTMENT (OUTPATIENT)
Age: 73
End: 2023-12-26

## 2023-12-28 ENCOUNTER — NON-APPOINTMENT (OUTPATIENT)
Age: 73
End: 2023-12-28

## 2024-01-01 PROBLEM — R35.0 INCREASED URINARY FREQUENCY: Status: ACTIVE | Noted: 2023-08-09

## 2024-01-01 NOTE — HISTORY OF PRESENT ILLNESS
[FreeTextEntry1] : Last seen in Aug 2023. BPH: had been on alfuzosin 10 mg every other day (daily dosing was causing dizziness). At the last visit, started on oxybutynin ER 5 mg secondary to urinary frequency. Discontinued the medication in October secondary to lack of efficacy. Called two weeks ago with complaints of worsening LUTS and lower abdominal/pelvic pain.   Urinary function: Reports week stream, urgency, frequency, nocturia x 5-6 times, and feeling of incomplete bladder emptying. Denies dysuria, hematuria or hesitancy. Denies constipation. Reports nocturia as most bothersome.  PVR - 1 mL.

## 2024-01-01 NOTE — ASSESSMENT
[FreeTextEntry1] : Discontinued oxybutynin, continue on alfuzosin 10 mg once daily.  Ordered mirabegron 25 mg once daily. Goals of medication reviewed.  Discussed the potential adverse effects of the medication.  Discussed the proper administration of the medication.   Ordered UA today. Will call with results.

## 2024-01-04 RX ORDER — ALFUZOSIN HYDROCHLORIDE 10 MG/1
10 TABLET, EXTENDED RELEASE ORAL
Qty: 90 | Refills: 3 | Status: ACTIVE | COMMUNITY
Start: 2023-08-09 | End: 1900-01-01

## 2024-04-29 ENCOUNTER — INPATIENT (INPATIENT)
Facility: HOSPITAL | Age: 74
LOS: 1 days | Discharge: HOME | End: 2024-05-01
Attending: INTERNAL MEDICINE | Admitting: INTERNAL MEDICINE
Payer: MEDICARE

## 2024-04-29 VITALS
RESPIRATION RATE: 18 BRPM | HEART RATE: 58 BPM | SYSTOLIC BLOOD PRESSURE: 117 MMHG | TEMPERATURE: 98 F | OXYGEN SATURATION: 96 % | DIASTOLIC BLOOD PRESSURE: 73 MMHG

## 2024-04-29 DIAGNOSIS — Z98.890 OTHER SPECIFIED POSTPROCEDURAL STATES: Chronic | ICD-10-CM

## 2024-04-29 DIAGNOSIS — Z95.5 PRESENCE OF CORONARY ANGIOPLASTY IMPLANT AND GRAFT: Chronic | ICD-10-CM

## 2024-04-29 DIAGNOSIS — R55 SYNCOPE AND COLLAPSE: ICD-10-CM

## 2024-04-29 LAB
ALBUMIN SERPL ELPH-MCNC: 4.3 G/DL — SIGNIFICANT CHANGE UP (ref 3.3–5)
ALP SERPL-CCNC: 62 U/L — SIGNIFICANT CHANGE UP (ref 40–120)
ALT FLD-CCNC: 21 U/L — SIGNIFICANT CHANGE UP (ref 4–41)
ANION GAP SERPL CALC-SCNC: 11 MMOL/L — SIGNIFICANT CHANGE UP (ref 7–14)
APTT BLD: 30 SEC — SIGNIFICANT CHANGE UP (ref 24.5–35.6)
AST SERPL-CCNC: 29 U/L — SIGNIFICANT CHANGE UP (ref 4–40)
BASOPHILS # BLD AUTO: 0.05 K/UL — SIGNIFICANT CHANGE UP (ref 0–0.2)
BASOPHILS NFR BLD AUTO: 0.7 % — SIGNIFICANT CHANGE UP (ref 0–2)
BILIRUB SERPL-MCNC: 0.7 MG/DL — SIGNIFICANT CHANGE UP (ref 0.2–1.2)
BUN SERPL-MCNC: 15 MG/DL — SIGNIFICANT CHANGE UP (ref 7–23)
CALCIUM SERPL-MCNC: 8.6 MG/DL — SIGNIFICANT CHANGE UP (ref 8.4–10.5)
CHLORIDE SERPL-SCNC: 100 MMOL/L — SIGNIFICANT CHANGE UP (ref 98–107)
CO2 SERPL-SCNC: 27 MMOL/L — SIGNIFICANT CHANGE UP (ref 22–31)
CREAT SERPL-MCNC: 1.01 MG/DL — SIGNIFICANT CHANGE UP (ref 0.5–1.3)
EGFR: 79 ML/MIN/1.73M2 — SIGNIFICANT CHANGE UP
EOSINOPHIL # BLD AUTO: 0.1 K/UL — SIGNIFICANT CHANGE UP (ref 0–0.5)
EOSINOPHIL NFR BLD AUTO: 1.4 % — SIGNIFICANT CHANGE UP (ref 0–6)
GLUCOSE SERPL-MCNC: 121 MG/DL — HIGH (ref 70–99)
HCT VFR BLD CALC: 48.2 % — SIGNIFICANT CHANGE UP (ref 39–50)
HGB BLD-MCNC: 16.2 G/DL — SIGNIFICANT CHANGE UP (ref 13–17)
IANC: 5.38 K/UL — SIGNIFICANT CHANGE UP (ref 1.8–7.4)
IMM GRANULOCYTES NFR BLD AUTO: 0.3 % — SIGNIFICANT CHANGE UP (ref 0–0.9)
INR BLD: 0.96 RATIO — SIGNIFICANT CHANGE UP (ref 0.85–1.18)
LYMPHOCYTES # BLD AUTO: 0.98 K/UL — LOW (ref 1–3.3)
LYMPHOCYTES # BLD AUTO: 13.8 % — SIGNIFICANT CHANGE UP (ref 13–44)
MAGNESIUM SERPL-MCNC: 2.4 MG/DL — SIGNIFICANT CHANGE UP (ref 1.6–2.6)
MCHC RBC-ENTMCNC: 30.7 PG — SIGNIFICANT CHANGE UP (ref 27–34)
MCHC RBC-ENTMCNC: 33.6 GM/DL — SIGNIFICANT CHANGE UP (ref 32–36)
MCV RBC AUTO: 91.5 FL — SIGNIFICANT CHANGE UP (ref 80–100)
MONOCYTES # BLD AUTO: 0.59 K/UL — SIGNIFICANT CHANGE UP (ref 0–0.9)
MONOCYTES NFR BLD AUTO: 8.3 % — SIGNIFICANT CHANGE UP (ref 2–14)
NEUTROPHILS # BLD AUTO: 5.38 K/UL — SIGNIFICANT CHANGE UP (ref 1.8–7.4)
NEUTROPHILS NFR BLD AUTO: 75.5 % — SIGNIFICANT CHANGE UP (ref 43–77)
NRBC # BLD: 0 /100 WBCS — SIGNIFICANT CHANGE UP (ref 0–0)
NRBC # FLD: 0 K/UL — SIGNIFICANT CHANGE UP (ref 0–0)
PLATELET # BLD AUTO: 182 K/UL — SIGNIFICANT CHANGE UP (ref 150–400)
POTASSIUM SERPL-MCNC: 3.3 MMOL/L — LOW (ref 3.5–5.3)
POTASSIUM SERPL-SCNC: 3.3 MMOL/L — LOW (ref 3.5–5.3)
PROT SERPL-MCNC: 7.3 G/DL — SIGNIFICANT CHANGE UP (ref 6–8.3)
PROTHROM AB SERPL-ACNC: 10.8 SEC — SIGNIFICANT CHANGE UP (ref 9.5–13)
RBC # BLD: 5.27 M/UL — SIGNIFICANT CHANGE UP (ref 4.2–5.8)
RBC # FLD: 13 % — SIGNIFICANT CHANGE UP (ref 10.3–14.5)
SODIUM SERPL-SCNC: 138 MMOL/L — SIGNIFICANT CHANGE UP (ref 135–145)
TROPONIN T, HIGH SENSITIVITY RESULT: 16 NG/L — SIGNIFICANT CHANGE UP
TROPONIN T, HIGH SENSITIVITY RESULT: 16 NG/L — SIGNIFICANT CHANGE UP
WBC # BLD: 7.12 K/UL — SIGNIFICANT CHANGE UP (ref 3.8–10.5)
WBC # FLD AUTO: 7.12 K/UL — SIGNIFICANT CHANGE UP (ref 3.8–10.5)

## 2024-04-29 PROCEDURE — 71045 X-RAY EXAM CHEST 1 VIEW: CPT | Mod: 26

## 2024-04-29 PROCEDURE — 99223 1ST HOSP IP/OBS HIGH 75: CPT

## 2024-04-29 PROCEDURE — 99285 EMERGENCY DEPT VISIT HI MDM: CPT

## 2024-04-29 PROCEDURE — 70450 CT HEAD/BRAIN W/O DYE: CPT | Mod: 26,MC

## 2024-04-29 RX ORDER — ASPIRIN/CALCIUM CARB/MAGNESIUM 324 MG
1 TABLET ORAL
Qty: 0 | Refills: 0 | DISCHARGE

## 2024-04-29 RX ORDER — ACETAMINOPHEN 500 MG
650 TABLET ORAL EVERY 6 HOURS
Refills: 0 | Status: DISCONTINUED | OUTPATIENT
Start: 2024-04-29 | End: 2024-05-01

## 2024-04-29 RX ORDER — ATORVASTATIN CALCIUM 80 MG/1
80 TABLET, FILM COATED ORAL AT BEDTIME
Refills: 0 | Status: DISCONTINUED | OUTPATIENT
Start: 2024-04-29 | End: 2024-05-01

## 2024-04-29 RX ORDER — LANOLIN ALCOHOL/MO/W.PET/CERES
3 CREAM (GRAM) TOPICAL AT BEDTIME
Refills: 0 | Status: DISCONTINUED | OUTPATIENT
Start: 2024-04-29 | End: 2024-05-01

## 2024-04-29 RX ORDER — POTASSIUM CHLORIDE 20 MEQ
40 PACKET (EA) ORAL ONCE
Refills: 0 | Status: COMPLETED | OUTPATIENT
Start: 2024-04-29 | End: 2024-04-29

## 2024-04-29 RX ORDER — ROSUVASTATIN CALCIUM 5 MG/1
1 TABLET ORAL
Qty: 0 | Refills: 0 | DISCHARGE

## 2024-04-29 RX ORDER — TAMSULOSIN HYDROCHLORIDE 0.4 MG/1
0.8 CAPSULE ORAL AT BEDTIME
Refills: 0 | Status: DISCONTINUED | OUTPATIENT
Start: 2024-04-29 | End: 2024-05-01

## 2024-04-29 RX ORDER — INFLUENZA VIRUS VACCINE 15; 15; 15; 15 UG/.5ML; UG/.5ML; UG/.5ML; UG/.5ML
0.7 SUSPENSION INTRAMUSCULAR ONCE
Refills: 0 | Status: COMPLETED | OUTPATIENT
Start: 2024-04-29 | End: 2024-04-29

## 2024-04-29 RX ORDER — AMLODIPINE BESYLATE 2.5 MG/1
5 TABLET ORAL DAILY
Refills: 0 | Status: DISCONTINUED | OUTPATIENT
Start: 2024-04-29 | End: 2024-05-01

## 2024-04-29 RX ORDER — SODIUM CHLORIDE 9 MG/ML
500 INJECTION INTRAMUSCULAR; INTRAVENOUS; SUBCUTANEOUS ONCE
Refills: 0 | Status: COMPLETED | OUTPATIENT
Start: 2024-04-29 | End: 2024-04-29

## 2024-04-29 RX ORDER — AMLODIPINE BESYLATE 2.5 MG/1
1 TABLET ORAL
Qty: 0 | Refills: 0 | DISCHARGE

## 2024-04-29 RX ORDER — ASPIRIN/CALCIUM CARB/MAGNESIUM 324 MG
81 TABLET ORAL DAILY
Refills: 0 | Status: DISCONTINUED | OUTPATIENT
Start: 2024-04-29 | End: 2024-05-01

## 2024-04-29 RX ORDER — ENOXAPARIN SODIUM 100 MG/ML
40 INJECTION SUBCUTANEOUS EVERY 24 HOURS
Refills: 0 | Status: DISCONTINUED | OUTPATIENT
Start: 2024-04-29 | End: 2024-05-01

## 2024-04-29 RX ADMIN — Medication 40 MILLIEQUIVALENT(S): at 16:17

## 2024-04-29 RX ADMIN — SODIUM CHLORIDE 500 MILLILITER(S): 9 INJECTION INTRAMUSCULAR; INTRAVENOUS; SUBCUTANEOUS at 14:22

## 2024-04-29 NOTE — ED ADULT NURSE NOTE - NSICDXPASTSURGICALHX_GEN_ALL_CORE_FT
PAST SURGICAL HISTORY:  History of loop recorder [employer] : Serial Number [id number]  Model [id number]      S/P coronary artery stent placement 2017

## 2024-04-29 NOTE — ED ADULT NURSE NOTE - OBJECTIVE STATEMENT
Patient brought to ER for lightheadedness and dizziness. Patient A&OX4. No distress noted. Breathing non-labored. Labs sent. RIght 20G IVL in place and tolerating well. Plan of care in progress.

## 2024-04-29 NOTE — H&P ADULT - NSHPPHYSICALEXAM_GEN_ALL_CORE
PHYSICAL EXAM:  Vital Signs Last 24 Hrs  T(C): 36.6 (04-29-24 @ 19:35)  T(F): 97.8 (04-29-24 @ 19:35), Max: 97.8 (04-29-24 @ 12:02)  HR: 74 (04-29-24 @ 19:35) (58 - 82)  BP: 136/77 (04-29-24 @ 19:35)  BP(mean): --  RR: 18 (04-29-24 @ 19:35) (16 - 18)  SpO2: 99% (04-29-24 @ 19:35) (96% - 99%)  Wt(kg): --    Constitutional: NAD, awake and alert, well developed  EYES: EOMI, conjunctiva clear  ENT:  Normal Hearing, no tonsillar exudates   Neck: Soft and supple , no thyromegaly   Respiratory: Breath sounds are clear bilaterally, No wheezing, rales or rhonchi, no tachypnea, no accessory muscle use  Cardiovascular: S1 and S2, regular rate and rhythm, no Murmurs, gallops or rubs, no JVD, no leg edema  Gastrointestinal: Bowel Sounds present, soft, nontender, nondistended, no guarding, no rebound  Extremities: No cyanosis or clubbing; warm to touch  Vascular: 2+ peripheral pulses lower ex  Neurological: No focal deficits, CN II-XII intact bilaterally, sensation to light touch intact in all extremities.   Musculoskeletal: 5/5 strength b/l upper and lower extremities; no joint swelling.  Skin: No rashes, no ulcerations

## 2024-04-29 NOTE — H&P ADULT - TIME BILLING
I had a face to face encounter with this patient. I spent 76 total minutes on the bedside interview and examination, coordination of care, counseling, chart review, order placement and documentation for this patient.

## 2024-04-29 NOTE — ED ADULT NURSE REASSESSMENT NOTE - NS ED NURSE REASSESS COMMENT FT1
Patient resting in stretcher A&Ox4, ambulating to bathroom with steady gait. Denies chest pain, sob, dizziness. RR equal and unlabored, remains on cardiac monitor. Care plan continued. Comfort measures provided. Safety maintained.

## 2024-04-29 NOTE — ED PROVIDER NOTE - CLINICAL SUMMARY MEDICAL DECISION MAKING FREE TEXT BOX
Trae Mcqueen MD (PGY-3) Adult male with known structural heart disease in the form of CAD status post stent placement, presenting with chief complaint of exertional lightheadedness complicated by syncope with associated ground-level fall.  His primary survey is unremarkable.  His trauma survey is overall negative.  He takes aspirin, and there is a question of head trauma, will obtain head CT to rule out ICH, low pretest suspicion.  No pretest concern for significant intrathoracic or intra-abdominal or extremity trauma.  He has no midline tenderness over his vertebral column and has no neurologic signs that would imply cord injury, low pretest suspicion for vertebral fracture.  Will assess for evidence of acute cardiac injury, electrolyte derangements. No concern for seizure as cause of LOC given character of story.  Troponin and CMP, CBC.  Overall he is felt to be at a high risk categorization for syncope given the nature of his story and his known coronary artery disease.  Likely CDU versus telemetry admission, will discuss with his cardiologist, Dr. Sami Colon, for further dispo planning.

## 2024-04-29 NOTE — H&P ADULT - NSICDXPASTSURGICALHX_GEN_ALL_CORE_FT
PAST SURGICAL HISTORY:  History of loop recorder [facility] : Serial Number [id number]  Model [id number]      S/P coronary artery stent placement 2017

## 2024-04-29 NOTE — ED PROVIDER NOTE - NSICDXPASTSURGICALHX_GEN_ALL_CORE_FT
PAST SURGICAL HISTORY:  History of loop recorder [employer] : Serial Number [id number]  Model [id number]      S/P coronary artery stent placement 2017

## 2024-04-29 NOTE — H&P ADULT - HISTORY OF PRESENT ILLNESS
74M with PMHx HTN, HLD, CAD s/p stent, BPH presenting with syncope. The patient was in [place] walking this AM and felt LH while ambulating. He walked to a [employer] and sat down initially but it got worse. He stood up and purchased a drink and then tried sitting down again. He had LOC and fell to ground in seated position. He denies head trauma. He believes he was passed out for a few seconds. He denies having tonic clonic movements, urinary/bowel incontinence, hx of seizures. He has been hydrating and eating normally the last few days and no vomiting or diarrhea. No recent changes to his medications. No other prodromal sx other than lightheadedness. Denies fevers, chills, cough, CP, SOB, palpitations,  complaints, LE edema. Of note the patient had this in 2022 and unknown etiology. Thought to be possibly vasovagal. The patient states he had a cardiac monitor years ago for similar complaints and it was normal. His cardiologist is Dr. Colon.

## 2024-04-29 NOTE — H&P ADULT - NSHPLABSRESULTS_GEN_ALL_CORE
Personally reviewed labs:                        16.2   7.12  )-----------( 182      ( 29 Apr 2024 14:20 )             48.2     04-29-24 @ 14:20    138  |  100  |  15             --------------------------< 121<H>     3.3<L>  |  27  | 1.01    eGFR AA: --  eGFR N-AA: --    Calcium: 8.6  Phosphorus: --  Magnesium: 2.40    AST: 29    ALT: 21  AlkPhos: 62  Protein: 7.3  Albumin: 4.3  TBili: 0.7  D-Bili: --        RADIOLOGY & ADDITIONAL TESTS:    EKG my independent interpretation: NSR with PACs, no STD or FELIX    CXR:  IMPRESSION:  Clear lungs.    CTH:  IMPRESSION:  1.  No acute intracranial abnormality.  2.  Sinus disease.

## 2024-04-29 NOTE — ED PROVIDER NOTE - PHYSICAL EXAMINATION
Gen: NAD, non-toxic appearing  Head: normal appearing  HEENT: normal conjunctiva  Lung: no respiratory distress, speaking in full sentences , ctab     CV: regular rate and rhythm, no murmurs  Abd: soft, non distended, non tender   MSK: no visible deformities, no tenderness over the extremities, unremarkable exam with AP pressure over the pelvis. No midline vertebral tenderness.   Neuro: alert and grossly oriented, no gross motor deficits  Skin: No willy rashes

## 2024-04-29 NOTE — ED PROVIDER NOTE - ATTENDING CONTRIBUTION TO CARE
74M yo with PMHx HTN, HLD, CAD s/p stent, BPH presenting with syncope. The patient was in [place] walking this AM and felt lightheaded while ambulating. The pt has had this in the past, last episode 9/2022 with unremarkable w/u. Possibly vasovagal vs cardiac related. No recent med changes. EKG with frequent PACs and no ST changes. Trops 16 x2. Not ACS as no CP and trops flat. Will admit for syncopal work-up.    I performed a history and physical exam of the patient and discussed their management with the resident. I reviewed the resident's note and agree with the documented findings and plan of care. My medical decision making and observations are found above.

## 2024-04-29 NOTE — H&P ADULT - NSHPREVIEWOFSYSTEMS_GEN_ALL_CORE
ROS:    Constitutional: [ ] fevers [ ] chills   HEENT: [ ] postnasal drip [ ] nasal congestion  CV: [ ] chest pain [ ] orthopnea [ ] palpitations [ ] murmur  Resp: [ ] cough [ ] shortness of breath [ ] dyspnea [ ] wheezing   GI: [ ] nausea [ ] vomiting [ ] diarrhea [ ] constipation [ ] abd pain   : [ ] dysuria [ ] nocturia [ ] hematuria [ ] increased urinary frequency  Musculoskeletal: [ ] back pain [ ] myalgias [ ] arthralgias [ ] fracture  Skin: [ ] rash [ ] itch  Neurological: [ ] headache [ ] dizziness [ x] syncope  Endocrine: [ ] diabetes [ ] thyroid problem  Hematologic/Lymphatic: [ ] anemia [ ] bleeding problem  [x ] All other systems negative

## 2024-04-29 NOTE — ED ADULT NURSE NOTE - NSFALLCONCLUSION_ED_ALL_ED
Universal Safety Interventions case discussed with IR, patient stable, no need for IR procedure, GI called, Patient to be admitted at [facility] , will follow in AM hold eliquis, possible colonoscopy tomorrow, evaluated by intensivist, Patient stable for MS tele adm.,

## 2024-04-29 NOTE — H&P ADULT - PROBLEM SELECTOR PLAN 1
Unclear etiology of syncope. Has had this in the past, last episode 9/2022 with unremarkable w/u. Possibly vasovagal vs cardiac related. No recent med changes. EKG with frequent PACs and no ST changes. Trops 16 x2. Not ACS as no CP and trops flat. On tele NSR at this time  -check orthostatics  -echo  -carotid doppler  -rest of w/u per primary cardiologist Dr. Sami Colon  -may require longer term rhythm monitoring on discharge

## 2024-04-29 NOTE — ED PROVIDER NOTE - OBJECTIVE STATEMENT
74-year-old male, history of hypertension, hyperlipidemia, CAD status post stent placement, last stress test 1 year ago which was unremarkable, presenting with a chief complaint of syncope.  The patient was walking to [place] this morning.  This is his normal routine.  He became lightheaded on the walk there.  When he got there he sat down, his lightheadedness improved with rest.  He then got up to order coffee, the lightheadedness recurred.  By the time he got back to his chair, he passed out.  Unclear history of head trauma.  Was able to ambulate after the incident.  Now with ongoing mild lightheadedness while at rest.  Review of systems otherwise negative.  No recent medication changes.  No allergies to medications.

## 2024-04-29 NOTE — PATIENT PROFILE ADULT - FALL HARM RISK - HARM RISK INTERVENTIONS
Assistance with ambulation/Assistance OOB with selected safe patient handling equipment/Communicate Risk of Fall with Harm to all staff/Discuss with provider need for PT consult/Monitor for mental status changes/Monitor gait and stability/Reinforce activity limits and safety measures with patient and family/Tailored Fall Risk Interventions/Visual Cue: Yellow wristband and red socks/Bed in lowest position, wheels locked, appropriate side rails in place/Call bell, personal items and telephone in reach/Instruct patient to call for assistance before getting out of bed or chair/Non-slip footwear when patient is out of bed/[place] to call system/Physically safe environment - no spills, clutter or unnecessary equipment/Purposeful Proactive Rounding/Room/bathroom lighting operational, light cord in reach

## 2024-04-30 ENCOUNTER — RESULT REVIEW (OUTPATIENT)
Age: 74
End: 2024-04-30

## 2024-04-30 DIAGNOSIS — E78.5 HYPERLIPIDEMIA, UNSPECIFIED: ICD-10-CM

## 2024-04-30 DIAGNOSIS — N40.0 BENIGN PROSTATIC HYPERPLASIA WITHOUT LOWER URINARY TRACT SYMPTOMS: ICD-10-CM

## 2024-04-30 DIAGNOSIS — Z29.9 ENCOUNTER FOR PROPHYLACTIC MEASURES, UNSPECIFIED: ICD-10-CM

## 2024-04-30 DIAGNOSIS — I10 ESSENTIAL (PRIMARY) HYPERTENSION: ICD-10-CM

## 2024-04-30 DIAGNOSIS — R55 SYNCOPE AND COLLAPSE: ICD-10-CM

## 2024-04-30 DIAGNOSIS — I25.10 ATHEROSCLEROTIC HEART DISEASE OF NATIVE CORONARY ARTERY WITHOUT ANGINA PECTORIS: ICD-10-CM

## 2024-04-30 LAB
ANION GAP SERPL CALC-SCNC: 14 MMOL/L — SIGNIFICANT CHANGE UP (ref 7–14)
BUN SERPL-MCNC: 12 MG/DL — SIGNIFICANT CHANGE UP (ref 7–23)
CALCIUM SERPL-MCNC: 8.4 MG/DL — SIGNIFICANT CHANGE UP (ref 8.4–10.5)
CHLORIDE SERPL-SCNC: 104 MMOL/L — SIGNIFICANT CHANGE UP (ref 98–107)
CO2 SERPL-SCNC: 25 MMOL/L — SIGNIFICANT CHANGE UP (ref 22–31)
CREAT SERPL-MCNC: 0.77 MG/DL — SIGNIFICANT CHANGE UP (ref 0.5–1.3)
EGFR: 95 ML/MIN/1.73M2 — SIGNIFICANT CHANGE UP
GLUCOSE SERPL-MCNC: 93 MG/DL — SIGNIFICANT CHANGE UP (ref 70–99)
HCT VFR BLD CALC: 44 % — SIGNIFICANT CHANGE UP (ref 39–50)
HGB BLD-MCNC: 15.3 G/DL — SIGNIFICANT CHANGE UP (ref 13–17)
MAGNESIUM SERPL-MCNC: 2.2 MG/DL — SIGNIFICANT CHANGE UP (ref 1.6–2.6)
MCHC RBC-ENTMCNC: 31.3 PG — SIGNIFICANT CHANGE UP (ref 27–34)
MCHC RBC-ENTMCNC: 34.8 GM/DL — SIGNIFICANT CHANGE UP (ref 32–36)
MCV RBC AUTO: 90 FL — SIGNIFICANT CHANGE UP (ref 80–100)
MRSA PCR RESULT.: SIGNIFICANT CHANGE UP
NRBC # BLD: 0 /100 WBCS — SIGNIFICANT CHANGE UP (ref 0–0)
NRBC # FLD: 0 K/UL — SIGNIFICANT CHANGE UP (ref 0–0)
PHOSPHATE SERPL-MCNC: 2.1 MG/DL — LOW (ref 2.5–4.5)
PLATELET # BLD AUTO: 169 K/UL — SIGNIFICANT CHANGE UP (ref 150–400)
POTASSIUM SERPL-MCNC: 3.5 MMOL/L — SIGNIFICANT CHANGE UP (ref 3.5–5.3)
POTASSIUM SERPL-SCNC: 3.5 MMOL/L — SIGNIFICANT CHANGE UP (ref 3.5–5.3)
RBC # BLD: 4.89 M/UL — SIGNIFICANT CHANGE UP (ref 4.2–5.8)
RBC # FLD: 13.2 % — SIGNIFICANT CHANGE UP (ref 10.3–14.5)
S AUREUS DNA NOSE QL NAA+PROBE: SIGNIFICANT CHANGE UP
SODIUM SERPL-SCNC: 143 MMOL/L — SIGNIFICANT CHANGE UP (ref 135–145)
WBC # BLD: 5.71 K/UL — SIGNIFICANT CHANGE UP (ref 3.8–10.5)
WBC # FLD AUTO: 5.71 K/UL — SIGNIFICANT CHANGE UP (ref 3.8–10.5)

## 2024-04-30 PROCEDURE — 93306 TTE W/DOPPLER COMPLETE: CPT | Mod: 26

## 2024-04-30 PROCEDURE — 93880 EXTRACRANIAL BILAT STUDY: CPT | Mod: 26

## 2024-04-30 RX ORDER — SODIUM CHLORIDE 0.65 %
1 AEROSOL, SPRAY (ML) NASAL THREE TIMES A DAY
Refills: 0 | Status: DISCONTINUED | OUTPATIENT
Start: 2024-04-30 | End: 2024-05-01

## 2024-04-30 RX ORDER — METOPROLOL TARTRATE 50 MG
25 TABLET ORAL DAILY
Refills: 0 | Status: DISCONTINUED | OUTPATIENT
Start: 2024-04-30 | End: 2024-05-01

## 2024-04-30 RX ADMIN — ENOXAPARIN SODIUM 40 MILLIGRAM(S): 100 INJECTION SUBCUTANEOUS at 05:22

## 2024-04-30 RX ADMIN — TAMSULOSIN HYDROCHLORIDE 0.8 MILLIGRAM(S): 0.4 CAPSULE ORAL at 21:33

## 2024-04-30 RX ADMIN — ATORVASTATIN CALCIUM 80 MILLIGRAM(S): 80 TABLET, FILM COATED ORAL at 21:33

## 2024-04-30 RX ADMIN — Medication 1 SPRAY(S): at 10:18

## 2024-04-30 RX ADMIN — Medication 81 MILLIGRAM(S): at 11:56

## 2024-04-30 RX ADMIN — AMLODIPINE BESYLATE 5 MILLIGRAM(S): 2.5 TABLET ORAL at 05:23

## 2024-04-30 NOTE — CONSULT NOTE ADULT - SUBJECTIVE AND OBJECTIVE BOX
CARDIOLOGY CONSULT - Dr. Colon         HPI:  74M with PMHx HTN, HLD, CAD s/p stent, BPH presenting with syncope. The patient was in [place] walking this AM and felt LH while ambulating. He walked to a [employer] and sat down initially but it got worse. He stood up and purchased a drink and then tried sitting down again. He had LOC and fell to ground in seated position. He denies head trauma. He believes he was passed out for a few seconds. He denies having tonic clonic movements, urinary/bowel incontinence, hx of seizures. He has been hydrating and eating normally the last few days and no vomiting or diarrhea. No recent changes to his medications. No other prodromal sx other than lightheadedness. Denies fevers, chills, cough, CP, SOB, palpitations,  complaints, LE edema. Of note the patient had this in  and unknown etiology. Thought to be possibly vasovagal.  pt is known to our practice. he reports frequent co dizziness after taking his BPH meds. He also states he does not feel well when his bp drops below sys 120s. his last STRESS 19:  Normal myocardial perfusion scan,with no evidence of infarction or inducible ischemia; ECHO 23: nl LV sys fx ef 62%,   no cp or sob   ROS Otherwise negative       PAST MEDICAL HISTORY:  Hypertension, Hyperlipidemia, BPH, Coronary Artery Disease, S/P PCI to mid LAD 2017, Vertigo, Carotid Disease    PAST SURGICAL HISTORY:  Loop Implant 2019, Cholecystectomy 2019    SOCIAL HISTORY:  Tobacco History: Ex-smoker, 40 year pack history; Alcohol: occasionally;  Caffeine: two cups daily    FAMILY HISTORY:  Father: Hypertension, Cirrhosis of the Liver    ALLERGIES: No Known Allergies    PREVIOUS DIAGNOSTIC TESTING:    ECHO 23: nl LV sys fx ef 62%,   STRESS 19:  Normal myocardial perfusion scan,with no evidence of infarction or inducible ischemia.  STRESS 2017: EF 55%, apical/septal ischemia  CATH 2017: mid lad 90%, distal lm 30%, ef 60%, 3.0 x 18 COBRAstent mid LAD  ECHO 2018: normal LV fx, EF 64%  ECHO 2018: normal lv fxn  ECHO 2016: normal LV function  CAROTID 2018: mildb/l disease  CAROTID 2016: mild bilateral disease      MEDICATIONS: CRESTOR  ALFUZOSIN 10 MG...............Si tablet, extended release QD for 90 Days Qty: 90  FAMOTIDINE 20 MG..............Si tablet 2 times per day for 90 Days Qty: 180  AMLODIPINE 2.5 MG.............Si tablet QD for 90 Days Qty: 90  RAMIPRIL 10 MG................Si tablet QD for 90 Days Qty: 90  MECLIZINE 25 MG...............Si tablet 3 times per day for 30 Days Qty: 90  METOPROLOL SUCCINATE ER 50 MG..Si tablet, extended release QD for 90 Days Qty: 90  ASPIRIN 81 MG.................Si delayed release tablet QD for 90 Days Qty: 90    MEDICATIONS  (STANDING):  amLODIPine   Tablet 5 milliGRAM(s) Oral daily  aspirin enteric coated 81 milliGRAM(s) Oral daily  atorvastatin 80 milliGRAM(s) Oral at bedtime  enoxaparin Injectable 40 milliGRAM(s) SubCutaneous every 24 hours  influenza  Vaccine (HIGH DOSE) 0.7 milliLiter(s) IntraMuscular once  tamsulosin 0.8 milliGRAM(s) Oral at bedtime        REVIEW OF SYSTEMS:  CONSTITUTIONAL: No fever, weight loss, or fatigue  EYES: No eye pain, visual disturbances, or discharge  ENMT:  No difficulty hearing, tinnitus, vertigo; No sinus or throat pain  NECK: No pain or stiffness  RESPIRATORY: No cough, wheezing, chills or hemoptysis; No Shortness of Breath  CARDIOVASCULAR: No chest pain, palpitations, passing out, dizziness, or leg swelling  GASTROINTESTINAL: No abdominal or epigastric pain. No nausea, vomiting, or hematemesis; No diarrhea or constipation. No melena or hematochezia.  GENITOURINARY: No dysuria, frequency, hematuria, or incontinence  NEUROLOGICAL: No headaches, memory loss, loss of strength, numbness, or tremors  SKIN: No itching, burning, rashes, or lesions   	    [x ] All others negative	  [ ] Unable to obtain    PHYSICAL EXAM:  T(C): 36.8 (24 @ 05:20), Max: 36.8 (24 @ 05:20)  HR: 74 (24 @ 05:20) (58 - 82)  BP: 142/75 (24 @ 05:20) (117/73 - 145/77)  RR: 18 (24 @ 05:20) (16 - 18)  SpO2: 97% (24 @ 05:20) (96% - 99%)  Wt(kg): --  I&O's Summary    2024 07:01  -  2024 10:19  --------------------------------------------------------  IN: 300 mL / OUT: 0 mL / NET: 300 mL        Appearance: Normal	  Psychiatry: A & O x 3, Mood & affect appropriate  HEENT:   Normal oral mucosa, PERRL, EOMI	  Lymphatic: No lymphadenopathy  Cardiovascular: Normal S1 S2,RRR, No JVD, No murmurs  Respiratory: Lungs clear to auscultation	  Gastrointestinal:  Soft, Non-tender, + BS	  Skin: No rashes, No ecchymoses, No cyanosis	  Neurologic: Non-focal  Extremities: Normal range of motion, No clubbing, cyanosis or edema  Vascular: Peripheral pulses palpable 2+ bilaterally    TELEMETRY: 	nsr pvc     ECG: nsr pac hr 78  	  RADIOLOGY:   < from: VA Duplex Carotid Arteries Complete, Bilateral (24 @ 06:37) >  CONCLUSIONS:      1. Right: There is moderate heterogenous plaque noted within the proximal right internal carotid artery, consistent with a 50-79% stenosis.   2. Left: There is mild heterogenous plaque noted within the proximal left internal carotid artery,consistent with a 16-49% stenosis. No hemodynamically significant stenosis.   3. Subclavian arteries: no significant atherosclerosis bilaterally.      < end of copied text >    < from: Xray Chest 1 View AP/PA (24 @ 15:06) >    FINDINGS:    The heart is normal in size.  The lungs are clear.  There is no pneumothorax or pleural effusion.  No acute bony abnormality.    IMPRESSION:  Clear lungs.      < end of copied text >      < from: CT Head No Cont (24 @ 16:50) >  cells. Visualized mastoid air cells clear. Orbits are unremarkable.  ______________________  IMPRESSION:  1.  No acute intracranial abnormality.  2.  Sinus disease.    --- End of Report ---      < end of copied text >    OTHER: 	  	  LABS:	 	    CARDIAC MARKERS:  Troponin T, High Sensitivity Result: 16 ng/L ( @ 16:01)  Troponin T, High Sensitivity Result: 16 ng/L ( @ 14:20)                                  16.2   7.12  )-----------( 182      ( 2024 14:20 )             48.2         138  |  100  |  15  ----------------------------<  121<H>  3.3<L>   |  27  |  1.01    Ca    8.6      2024 14:20  Mg     2.40         TPro  7.3  /  Alb  4.3  /  TBili  0.7  /  DBili  x   /  AST  29  /  ALT  21  /  AlkPhos  62      PT/INR - ( 2024 14:20 )   PT: 10.8 sec;   INR: 0.96 ratio         PTT - ( 2024 14:20 )  PTT:30.0 sec  proBNP:   Lipid Profile:   HgA1c:   TSH:

## 2024-04-30 NOTE — PROGRESS NOTE ADULT - ASSESSMENT
74 M with hx Hypertension, Hyperlipidemia, BPH, Coronary Artery Disease, S/P PCI to mid LAD 11/2017, Vertigo, Carotid Disease, syncope sp ILR  presenting with syncope       #Recurrent Syncope  Rt. ICS : 50-70% stenosis   NST pending   cardio following     #CAD S/P PCI   c/w asa/ statin     # Hypertension  started on toprol   c/w amlodipine     dispo: NST

## 2024-04-30 NOTE — CONSULT NOTE ADULT - TIME BILLING
agree with above  74 M with hx Hypertension, Hyperlipidemia, BPH, Coronary Artery Disease, S/P PCI to mid LAD 11/2017, Vertigo, Carotid Disease, syncope sp ILR  presenting with syncope       #Recurrent Syncope  -unclear etiology   -ecg with no ischemic changes. HS trop negtive  -carotid duplex moderate heterogenous plaque noted within the proximal right internal carotid artery, consistent with a 50-79% stenosis. mild heterogenous plaque noted within the proximal left internal carotid artery,consistent with a 16-49% stenosis. No hemodynamically significant stenosis.  -check orthostatics   -EP EVAL TO INTERROGATE ILR   -Check echo   -Check stress test for ischemic eval   -ct head with no No acute intracranial abnormality. Sinus disease.  -med eval     #Coronary Artery Disease, S/P PCI to mid LAD  -continue ASA 81mg daily  -continue statin and toprol daily  -plan for nuclear stress testing    # Hypertension  -bp controlled  -continue norvasc for now  - will consider holding if needed to allow for some permissive htn   -frequent PVC--- resume  toprol xl at 25 daily   -will allow for some permissive htn as he routinely felt presyncopal with SBP in 120-130`s range    #Carotid Disease  -carotid noted   -continue asa + statin daily

## 2024-04-30 NOTE — PHYSICAL THERAPY INITIAL EVALUATION ADULT - GENERAL OBSERVATIONS, REHAB EVAL
Patient received semi-supine in NAD, +telemetry, agreeable to participate. HR 53 BPM, /77, 100% oxygen saturation. Patient received semi-supine in NAD, +telemetry, agreeable to participate. HR 81 BPM, /77, 100% oxygen saturation.

## 2024-04-30 NOTE — PHYSICAL THERAPY INITIAL EVALUATION ADULT - NSPTDISCHREC_GEN_A_CORE
Patient to remain on PT program to promote safe mobility and prevent deconditioning./No skilled PT needs

## 2024-04-30 NOTE — CONSULT NOTE ADULT - ASSESSMENT
74 M with hx Hypertension, Hyperlipidemia, BPH, Coronary Artery Disease, S/P PCI to mid LAD 11/2017, Vertigo, Carotid Disease, syncope sp ILR  presenting with syncope       #Recurrent Syncope  -unclear etiology   -ecg with no ischemic changes. HS trop negtive  -carotid duplex moderate heterogenous plaque noted within the proximal right internal carotid artery, consistent with a 50-79% stenosis. mild heterogenous plaque noted within the proximal left internal carotid artery,consistent with a 16-49% stenosis. No hemodynamically significant stenosis.  -check orthostatics   -EP EVAL TO INTERROGATE ILR   -Check echo   -Check stress test for ischemic eval   -ct head with no No acute intracranial abnormality. Sinus disease.  -med eval     #Coronary Artery Disease, S/P PCI to mid LAD  -continue ASA 81mg daily  -continue statin and toprol daily  -plan for nuclear stress testing    # Hypertension  -bp controlled  -continue norvasc for now  - will consider holding if needed to allow for some permissive htn   -frequent PVC--- resume  toprol xl at 25 daily   -will allow for some permissive htn as he routinely felt presyncopal with SBP in 120-130`s range    #Carotid Disease  -carotid noted   -continue asa + statin daily

## 2024-04-30 NOTE — PHYSICAL THERAPY INITIAL EVALUATION ADULT - PERTINENT HX OF CURRENT PROBLEM, REHAB EVAL
74 year old male with PMHx HTN, HLD, CAD status post stent, BPH presenting with syncope. The patient was in [place] walking this AM and felt lightheaded while ambulating. He walked to a [employer] and sat down initially but it got worse. He stood up and purchased a drink and then tried sitting down again. He had LOC and fell to ground in seated position. No other prodromal signs and symptoms other than lightheadedness.

## 2024-04-30 NOTE — PHYSICAL THERAPY INITIAL EVALUATION ADULT - ADDITIONAL COMMENTS
Patient lives in a house with his family, +1 flight down to bedroom. Denies other falls.     Patient left semi-supine in NAD, +lines intact, +call bell.

## 2024-05-01 ENCOUNTER — TRANSCRIPTION ENCOUNTER (OUTPATIENT)
Age: 74
End: 2024-05-01

## 2024-05-01 ENCOUNTER — RESULT REVIEW (OUTPATIENT)
Age: 74
End: 2024-05-01

## 2024-05-01 VITALS
SYSTOLIC BLOOD PRESSURE: 150 MMHG | DIASTOLIC BLOOD PRESSURE: 75 MMHG | RESPIRATION RATE: 18 BRPM | HEART RATE: 72 BPM | TEMPERATURE: 98 F | OXYGEN SATURATION: 100 %

## 2024-05-01 LAB
ANION GAP SERPL CALC-SCNC: 11 MMOL/L — SIGNIFICANT CHANGE UP (ref 7–14)
BASOPHILS # BLD AUTO: 0.07 K/UL — SIGNIFICANT CHANGE UP (ref 0–0.2)
BASOPHILS NFR BLD AUTO: 1.3 % — SIGNIFICANT CHANGE UP (ref 0–2)
BUN SERPL-MCNC: 13 MG/DL — SIGNIFICANT CHANGE UP (ref 7–23)
CALCIUM SERPL-MCNC: 8.5 MG/DL — SIGNIFICANT CHANGE UP (ref 8.4–10.5)
CHLORIDE SERPL-SCNC: 103 MMOL/L — SIGNIFICANT CHANGE UP (ref 98–107)
CO2 SERPL-SCNC: 25 MMOL/L — SIGNIFICANT CHANGE UP (ref 22–31)
CREAT SERPL-MCNC: 0.85 MG/DL — SIGNIFICANT CHANGE UP (ref 0.5–1.3)
EGFR: 92 ML/MIN/1.73M2 — SIGNIFICANT CHANGE UP
EOSINOPHIL # BLD AUTO: 0.45 K/UL — SIGNIFICANT CHANGE UP (ref 0–0.5)
EOSINOPHIL NFR BLD AUTO: 8.1 % — HIGH (ref 0–6)
GLUCOSE SERPL-MCNC: 102 MG/DL — HIGH (ref 70–99)
HCT VFR BLD CALC: 47 % — SIGNIFICANT CHANGE UP (ref 39–50)
HGB BLD-MCNC: 16 G/DL — SIGNIFICANT CHANGE UP (ref 13–17)
IANC: 2.65 K/UL — SIGNIFICANT CHANGE UP (ref 1.8–7.4)
IMM GRANULOCYTES NFR BLD AUTO: 0.2 % — SIGNIFICANT CHANGE UP (ref 0–0.9)
LYMPHOCYTES # BLD AUTO: 1.76 K/UL — SIGNIFICANT CHANGE UP (ref 1–3.3)
LYMPHOCYTES # BLD AUTO: 31.6 % — SIGNIFICANT CHANGE UP (ref 13–44)
MAGNESIUM SERPL-MCNC: 2.5 MG/DL — SIGNIFICANT CHANGE UP (ref 1.6–2.6)
MCHC RBC-ENTMCNC: 30.7 PG — SIGNIFICANT CHANGE UP (ref 27–34)
MCHC RBC-ENTMCNC: 34 GM/DL — SIGNIFICANT CHANGE UP (ref 32–36)
MCV RBC AUTO: 90.2 FL — SIGNIFICANT CHANGE UP (ref 80–100)
MONOCYTES # BLD AUTO: 0.63 K/UL — SIGNIFICANT CHANGE UP (ref 0–0.9)
MONOCYTES NFR BLD AUTO: 11.3 % — SIGNIFICANT CHANGE UP (ref 2–14)
NEUTROPHILS # BLD AUTO: 2.65 K/UL — SIGNIFICANT CHANGE UP (ref 1.8–7.4)
NEUTROPHILS NFR BLD AUTO: 47.5 % — SIGNIFICANT CHANGE UP (ref 43–77)
NRBC # BLD: 0 /100 WBCS — SIGNIFICANT CHANGE UP (ref 0–0)
NRBC # FLD: 0 K/UL — SIGNIFICANT CHANGE UP (ref 0–0)
PHOSPHATE SERPL-MCNC: 2.8 MG/DL — SIGNIFICANT CHANGE UP (ref 2.5–4.5)
PLATELET # BLD AUTO: 170 K/UL — SIGNIFICANT CHANGE UP (ref 150–400)
POTASSIUM SERPL-MCNC: 3.4 MMOL/L — LOW (ref 3.5–5.3)
POTASSIUM SERPL-SCNC: 3.4 MMOL/L — LOW (ref 3.5–5.3)
RBC # BLD: 5.21 M/UL — SIGNIFICANT CHANGE UP (ref 4.2–5.8)
RBC # FLD: 12.9 % — SIGNIFICANT CHANGE UP (ref 10.3–14.5)
SODIUM SERPL-SCNC: 139 MMOL/L — SIGNIFICANT CHANGE UP (ref 135–145)
WBC # BLD: 5.57 K/UL — SIGNIFICANT CHANGE UP (ref 3.8–10.5)
WBC # FLD AUTO: 5.57 K/UL — SIGNIFICANT CHANGE UP (ref 3.8–10.5)

## 2024-05-01 PROCEDURE — 78452 HT MUSCLE IMAGE SPECT MULT: CPT | Mod: 26

## 2024-05-01 PROCEDURE — 93018 CV STRESS TEST I&R ONLY: CPT | Mod: GC

## 2024-05-01 PROCEDURE — 93016 CV STRESS TEST SUPVJ ONLY: CPT | Mod: GC

## 2024-05-01 RX ORDER — ALFUZOSIN HYDROCHLORIDE 10 MG/1
1 TABLET, EXTENDED RELEASE ORAL
Qty: 0 | Refills: 0 | DISCHARGE

## 2024-05-01 RX ORDER — POTASSIUM CHLORIDE 20 MEQ
40 PACKET (EA) ORAL ONCE
Refills: 0 | Status: COMPLETED | OUTPATIENT
Start: 2024-05-01 | End: 2024-05-01

## 2024-05-01 RX ORDER — METOPROLOL TARTRATE 50 MG
1 TABLET ORAL
Qty: 30 | Refills: 0
Start: 2024-05-01 | End: 2024-05-30

## 2024-05-01 RX ORDER — LORATADINE 10 MG/1
1 TABLET ORAL
Qty: 0 | Refills: 0 | DISCHARGE
Start: 2024-05-01

## 2024-05-01 RX ORDER — LORATADINE 10 MG/1
10 TABLET ORAL DAILY
Refills: 0 | Status: DISCONTINUED | OUTPATIENT
Start: 2024-05-01 | End: 2024-05-01

## 2024-05-01 RX ORDER — FLUTICASONE PROPIONATE 50 MCG
1 SPRAY, SUSPENSION NASAL
Refills: 0 | Status: DISCONTINUED | OUTPATIENT
Start: 2024-05-01 | End: 2024-05-01

## 2024-05-01 RX ADMIN — ENOXAPARIN SODIUM 40 MILLIGRAM(S): 100 INJECTION SUBCUTANEOUS at 05:03

## 2024-05-01 RX ADMIN — Medication 25 MILLIGRAM(S): at 05:12

## 2024-05-01 RX ADMIN — Medication 40 MILLIEQUIVALENT(S): at 06:00

## 2024-05-01 RX ADMIN — Medication 81 MILLIGRAM(S): at 12:34

## 2024-05-01 RX ADMIN — Medication 1 SPRAY(S): at 17:58

## 2024-05-01 RX ADMIN — AMLODIPINE BESYLATE 5 MILLIGRAM(S): 2.5 TABLET ORAL at 05:03

## 2024-05-01 NOTE — PROGRESS NOTE ADULT - ASSESSMENT
74 M with hx Hypertension, Hyperlipidemia, BPH, Coronary Artery Disease, S/P PCI to mid LAD 11/2017, Vertigo, Carotid Disease, syncope sp ILR  presenting with syncope       #Recurrent Syncope  Rt. ICS : 50-70% stenosis   NST pending   cardio following     #CAD S/P PCI   c/w asa/ statin     # Hypertension  started on toprol   c/w amlodipine     dispo: NST : no ischemic changes , ok to be d/c home

## 2024-05-01 NOTE — DISCHARGE NOTE PROVIDER - NSDCMRMEDTOKEN_GEN_ALL_CORE_FT
alfuzosin 10 mg oral tablet, extended release: 1 tab(s) orally once a day  amLODIPine 5 mg oral tablet: 1 tab(s) orally once a day  Aspirin Enteric Coated 81 mg oral delayed release tablet: 1 tab(s) orally once a day  loratadine 10 mg oral tablet: 1 tab(s) orally once a day  rosuvastatin 40 mg oral tablet: 1 tab(s) orally once a day   amLODIPine 5 mg oral tablet: 1 tab(s) orally once a day  Aspirin Enteric Coated 81 mg oral delayed release tablet: 1 tab(s) orally once a day  loratadine 10 mg oral tablet: 1 tab(s) orally once a day  metoprolol succinate 25 mg oral tablet, extended release: 1 tab(s) orally once a day  rosuvastatin 40 mg oral tablet: 1 tab(s) orally once a day

## 2024-05-01 NOTE — PROGRESS NOTE ADULT - SUBJECTIVE AND OBJECTIVE BOX
Patient is a 74y old  Male who presents with a chief complaint of syncope (01 May 2024 17:01)      INTERVAL HPI/OVERNIGHT EVENTS: doing well , denies any c/o  T(C): 36.7 (05-01-24 @ 17:30), Max: 36.7 (05-01-24 @ 17:30)  HR: 72 (05-01-24 @ 17:30) (71 - 78)  BP: 150/75 (05-01-24 @ 17:30) (117/74 - 150/75)  RR: 18 (05-01-24 @ 17:30) (18 - 18)  SpO2: 100% (05-01-24 @ 17:30) (96% - 100%)  Wt(kg): --  I&O's Summary    30 Apr 2024 07:01  -  01 May 2024 07:00  --------------------------------------------------------  IN: 1200 mL / OUT: 500 mL / NET: 700 mL    01 May 2024 07:01  -  01 May 2024 22:23  --------------------------------------------------------  IN: 240 mL / OUT: 0 mL / NET: 240 mL        PAST MEDICAL & SURGICAL HISTORY:  BPH (benign prostatic hyperplasia)      Cholelithiasis      HLD (hyperlipidemia)      CAD (coronary artery disease)  S/P coronary artery stent placement      Carotid artery stenosis      History of chronic cholecystitis      Hypertension      S/P coronary artery stent placement  2017      History of loop recorder  [employer] : Serial Number [id number]  Model [id number]              SOCIAL HISTORY  Alcohol:  Tobacco:  Illicit substance use:    FAMILY HISTORY:    REVIEW OF SYSTEMS:  CONSTITUTIONAL: No fever, weight loss, or fatigue  EYES: No eye pain, visual disturbances, or discharge  ENMT:  No difficulty hearing, tinnitus, vertigo; No sinus or throat pain  NECK: No pain or stiffness  RESPIRATORY: No cough, wheezing, chills or hemoptysis; No shortness of breath  CARDIOVASCULAR: No chest pain, palpitations, dizziness, or leg swelling  GASTROINTESTINAL: No abdominal or epigastric pain. No nausea, vomiting, or hematemesis; No diarrhea or constipation. No melena or hematochezia.  GENITOURINARY: No dysuria, frequency, hematuria, or incontinence  NEUROLOGICAL: No headaches, memory loss, loss of strength, numbness, or tremors  SKIN: No itching, burning, rashes, or lesions   LYMPH NODES: No enlarged glands  ENDOCRINE: No heat or cold intolerance; No hair loss  MUSCULOSKELETAL: No joint pain or swelling; No muscle, back, or extremity pain  PSYCHIATRIC: No depression, anxiety, mood swings, or difficulty sleeping  HEME/LYMPH: No easy bruising, or bleeding gums  ALLERY AND IMMUNOLOGIC: No hives or eczema    RADIOLOGY & ADDITIONAL TESTS:    Imaging Personally Reviewed:  [ ] YES  [ ] NO    Consultant(s) Notes Reviewed:  [ ] YES  [ ] NO    PHYSICAL EXAM:  GENERAL: NAD, well-groomed, well-developed  HEAD:  Atraumatic, Normocephalic  EYES: EOMI, PERRLA, conjunctiva and sclera clear  ENMT: No tonsillar erythema, exudates, or enlargement; Moist mucous membranes, Good dentition, No lesions  NECK: Supple, No JVD, Normal thyroid  NERVOUS SYSTEM:  Alert & Oriented X3, Good concentration; Motor Strength 5/5 B/L upper and lower extremities; DTRs 2+ intact and symmetric  CHEST/LUNG: Clear to percussion bilaterally; No rales, rhonchi, wheezing, or rubs  HEART: Regular rate and rhythm; No murmurs, rubs, or gallops  ABDOMEN: Soft, Nontender, Nondistended; Bowel sounds present  EXTREMITIES:  2+ Peripheral Pulses, No clubbing, cyanosis, or edema  LYMPH: No lymphadenopathy noted  SKIN: No rashes or lesions    LABS:                        16.0   5.57  )-----------( 170      ( 01 May 2024 04:33 )             47.0     05-01    139  |  103  |  13  ----------------------------<  102<H>  3.4<L>   |  25  |  0.85    Ca    8.5      01 May 2024 04:33  Phos  2.8     05-01  Mg     2.50     05-01        Urinalysis Basic - ( 01 May 2024 04:33 )    Color: x / Appearance: x / SG: x / pH: x  Gluc: 102 mg/dL / Ketone: x  / Bili: x / Urobili: x   Blood: x / Protein: x / Nitrite: x   Leuk Esterase: x / RBC: x / WBC x   Sq Epi: x / Non Sq Epi: x / Bacteria: x      CAPILLARY BLOOD GLUCOSE            Urinalysis Basic - ( 01 May 2024 04:33 )    Color: x / Appearance: x / SG: x / pH: x  Gluc: 102 mg/dL / Ketone: x  / Bili: x / Urobili: x   Blood: x / Protein: x / Nitrite: x   Leuk Esterase: x / RBC: x / WBC x   Sq Epi: x / Non Sq Epi: x / Bacteria: x        MEDICATIONS  (STANDING):  amLODIPine   Tablet 5 milliGRAM(s) Oral daily  aspirin enteric coated 81 milliGRAM(s) Oral daily  atorvastatin 80 milliGRAM(s) Oral at bedtime  enoxaparin Injectable 40 milliGRAM(s) SubCutaneous every 24 hours  fluticasone propionate 50 MICROgram(s)/spray Nasal Spray 1 Spray(s) Both Nostrils two times a day  loratadine 10 milliGRAM(s) Oral daily  metoprolol succinate ER 25 milliGRAM(s) Oral daily  tamsulosin 0.8 milliGRAM(s) Oral at bedtime    MEDICATIONS  (PRN):  acetaminophen     Tablet .. 650 milliGRAM(s) Oral every 6 hours PRN Temp greater or equal to 38C (100.4F), Mild Pain (1 - 3)  melatonin 3 milliGRAM(s) Oral at bedtime PRN Insomnia  sodium chloride 0.65% Nasal 1 Spray(s) Both Nostrils three times a day PRN Nasal Congestion      Care Discussed with Consultants/Other Providers [ ] YES  [ ] NO
CARDIOLOGY FOLLOW UP - Dr. Colon  Date of Service: 5/1/2024  CC: no events, seen in stress lab    Review of Systems:  Constitutional: No fever, weight loss, or fatigue  Respiratory: No cough, wheezing, or hemoptysis, no shortness of breath  Cardiovascular: No chest pain, palpitations, passing out, dizziness, or leg swelling  Gastrointestinal: No abd or epigastric pain. No nausea, vomiting, or hematemesis; no diarrhea or consiptaiton, no melena or hematochezia  Vascular: No edema     TELEMETRY:    PHYSICAL EXAM:  T(C): 36.4 (05-01-24 @ 09:00), Max: 36.9 (04-30-24 @ 12:30)  HR: 78 (05-01-24 @ 09:00) (70 - 78)  BP: 121/67 (05-01-24 @ 09:00) (117/74 - 173/80)  RR: 18 (05-01-24 @ 09:00) (16 - 18)  SpO2: 100% (05-01-24 @ 09:00) (96% - 100%)  Wt(kg): --  I&O's Summary    30 Apr 2024 07:01  -  01 May 2024 07:00  --------------------------------------------------------  IN: 1200 mL / OUT: 500 mL / NET: 700 mL    01 May 2024 07:01  -  01 May 2024 12:02  --------------------------------------------------------  IN: 240 mL / OUT: 0 mL / NET: 240 mL        Appearance: Normal	  Cardiovascular: Normal S1 S2,RRR, No JVD, No murmurs  Respiratory: Lungs clear to auscultation	  Gastrointestinal:  Soft, Non-tender, + BS	  Extremities: Normal range of motion, No clubbing, cyanosis or edema  Vascular: Peripheral pulses palpable 2+ bilaterally       Home Medications:  alfuzosin 10 mg oral tablet, extended release: 1 tab(s) orally once a day (29 Apr 2024 23:46)  amLODIPine 5 mg oral tablet: 1 tab(s) orally once a day (29 Apr 2024 23:46)  Aspirin Enteric Coated 81 mg oral delayed release tablet: 1 tab(s) orally once a day (29 Apr 2024 23:46)  rosuvastatin 40 mg oral tablet: 1 tab(s) orally once a day (29 Apr 2024 23:46)        MEDICATIONS  (STANDING):  amLODIPine   Tablet 5 milliGRAM(s) Oral daily  aspirin enteric coated 81 milliGRAM(s) Oral daily  atorvastatin 80 milliGRAM(s) Oral at bedtime  enoxaparin Injectable 40 milliGRAM(s) SubCutaneous every 24 hours  metoprolol succinate ER 25 milliGRAM(s) Oral daily  tamsulosin 0.8 milliGRAM(s) Oral at bedtime        EKG:  RADIOLOGY:  DIAGNOSTIC TESTING:  [ ] Echocardiogram:  [ ] Catherterization:  [ ] Stress Test:  OTHER:     LABS:	 	                          16.0   5.57  )-----------( 170      ( 01 May 2024 04:33 )             47.0     05-01    139  |  103  |  13  ----------------------------<  102<H>  3.4<L>   |  25  |  0.85    Ca    8.5      01 May 2024 04:33  Phos  2.8     05-01  Mg     2.50     05-01    TPro  7.3  /  Alb  4.3  /  TBili  0.7  /  DBili  x   /  AST  29  /  ALT  21  /  AlkPhos  62  04-29      PT/INR - ( 29 Apr 2024 14:20 )   PT: 10.8 sec;   INR: 0.96 ratio         PTT - ( 29 Apr 2024 14:20 )  PTT:30.0 sec    CARDIAC MARKERS:                  
Patient is a 74y old  Male who presents with a chief complaint of syncope (30 Apr 2024 10:18)      INTERVAL HPI/OVERNIGHT EVENTS: denies any Cp ,   T(C): 36.8 (04-30-24 @ 21:33), Max: 36.9 (04-30-24 @ 12:30)  HR: 73 (04-30-24 @ 21:33) (70 - 78)  BP: 173/80 (04-30-24 @ 21:33) (132/75 - 173/80)  RR: 17 (04-30-24 @ 21:33) (16 - 18)  SpO2: 100% (04-30-24 @ 21:33) (97% - 100%)  Wt(kg): --  I&O's Summary    30 Apr 2024 07:01  -  30 Apr 2024 23:07  --------------------------------------------------------  IN: 1200 mL / OUT: 500 mL / NET: 700 mL        PAST MEDICAL & SURGICAL HISTORY:  BPH (benign prostatic hyperplasia)      Cholelithiasis      HLD (hyperlipidemia)      CAD (coronary artery disease)  S/P coronary artery stent placement      Carotid artery stenosis      History of chronic cholecystitis      Hypertension      S/P coronary artery stent placement  2017      History of loop recorder  [employer] : Serial Number [id number]  Model [id number]              SOCIAL HISTORY  Alcohol:  Tobacco:  Illicit substance use:    FAMILY HISTORY:    REVIEW OF SYSTEMS:  CONSTITUTIONAL: No fever, weight loss, or fatigue  EYES: No eye pain, visual disturbances, or discharge  ENMT:  No difficulty hearing, tinnitus, vertigo; No sinus or throat pain  NECK: No pain or stiffness  RESPIRATORY: No cough, wheezing, chills or hemoptysis; No shortness of breath  CARDIOVASCULAR: No chest pain, palpitations, dizziness, or leg swelling  GASTROINTESTINAL: No abdominal or epigastric pain. No nausea, vomiting, or hematemesis; No diarrhea or constipation. No melena or hematochezia.  GENITOURINARY: No dysuria, frequency, hematuria, or incontinence  NEUROLOGICAL: No headaches, memory loss, loss of strength, numbness, or tremors  SKIN: No itching, burning, rashes, or lesions   LYMPH NODES: No enlarged glands  ENDOCRINE: No heat or cold intolerance; No hair loss  MUSCULOSKELETAL: No joint pain or swelling; No muscle, back, or extremity pain  PSYCHIATRIC: No depression, anxiety, mood swings, or difficulty sleeping  HEME/LYMPH: No easy bruising, or bleeding gums  ALLERY AND IMMUNOLOGIC: No hives or eczema    RADIOLOGY & ADDITIONAL TESTS:    Imaging Personally Reviewed:  [ ] YES  [ ] NO    Consultant(s) Notes Reviewed:  [ ] YES  [ ] NO    PHYSICAL EXAM:  GENERAL: NAD, well-groomed, well-developed  HEAD:  Atraumatic, Normocephalic  EYES: EOMI, PERRLA, conjunctiva and sclera clear  ENMT: No tonsillar erythema, exudates, or enlargement; Moist mucous membranes, Good dentition, No lesions  NECK: Supple, No JVD, Normal thyroid  NERVOUS SYSTEM:  Alert & Oriented X3, Good concentration; Motor Strength 5/5 B/L upper and lower extremities; DTRs 2+ intact and symmetric  CHEST/LUNG: Clear to percussion bilaterally; No rales, rhonchi, wheezing, or rubs  HEART: Regular rate and rhythm; No murmurs, rubs, or gallops  ABDOMEN: Soft, Nontender, Nondistended; Bowel sounds present  EXTREMITIES:  2+ Peripheral Pulses, No clubbing, cyanosis, or edema  LYMPH: No lymphadenopathy noted  SKIN: No rashes or lesions    LABS:                        15.3   5.71  )-----------( 169      ( 30 Apr 2024 10:00 )             44.0     04-30    143  |  104  |  12  ----------------------------<  93  3.5   |  25  |  0.77    Ca    8.4      30 Apr 2024 10:00  Phos  2.1     04-30  Mg     2.20     04-30    TPro  7.3  /  Alb  4.3  /  TBili  0.7  /  DBili  x   /  AST  29  /  ALT  21  /  AlkPhos  62  04-29    PT/INR - ( 29 Apr 2024 14:20 )   PT: 10.8 sec;   INR: 0.96 ratio         PTT - ( 29 Apr 2024 14:20 )  PTT:30.0 sec  Urinalysis Basic - ( 30 Apr 2024 10:00 )    Color: x / Appearance: x / SG: x / pH: x  Gluc: 93 mg/dL / Ketone: x  / Bili: x / Urobili: x   Blood: x / Protein: x / Nitrite: x   Leuk Esterase: x / RBC: x / WBC x   Sq Epi: x / Non Sq Epi: x / Bacteria: x      CAPILLARY BLOOD GLUCOSE            Urinalysis Basic - ( 30 Apr 2024 10:00 )    Color: x / Appearance: x / SG: x / pH: x  Gluc: 93 mg/dL / Ketone: x  / Bili: x / Urobili: x   Blood: x / Protein: x / Nitrite: x   Leuk Esterase: x / RBC: x / WBC x   Sq Epi: x / Non Sq Epi: x / Bacteria: x        MEDICATIONS  (STANDING):  amLODIPine   Tablet 5 milliGRAM(s) Oral daily  aspirin enteric coated 81 milliGRAM(s) Oral daily  atorvastatin 80 milliGRAM(s) Oral at bedtime  enoxaparin Injectable 40 milliGRAM(s) SubCutaneous every 24 hours  metoprolol succinate ER 25 milliGRAM(s) Oral daily  tamsulosin 0.8 milliGRAM(s) Oral at bedtime    MEDICATIONS  (PRN):  acetaminophen     Tablet .. 650 milliGRAM(s) Oral every 6 hours PRN Temp greater or equal to 38C (100.4F), Mild Pain (1 - 3)  melatonin 3 milliGRAM(s) Oral at bedtime PRN Insomnia  sodium chloride 0.65% Nasal 1 Spray(s) Both Nostrils three times a day PRN Nasal Congestion      Care Discussed with Consultants/Other Providers [ ] YES  [ ] NO

## 2024-05-01 NOTE — DISCHARGE NOTE NURSING/CASE MANAGEMENT/SOCIAL WORK - NSDCPEFALRISK_GEN_ALL_CORE
For information on Fall & Injury Prevention, visit: [email or web address] OR  [email or web address] OR  https://www.cdc.gov/steadi/patient.html

## 2024-05-01 NOTE — PROGRESS NOTE ADULT - ASSESSMENT
74 M with hx Hypertension, Hyperlipidemia, BPH, Coronary Artery Disease, S/P PCI to mid LAD 11/2017, Vertigo, Carotid Disease, syncope sp ILR  presenting with syncope       #Recurrent Syncope  -unclear etiology   -ecg with no ischemic changes. HS trop negtive  -carotid duplex moderate heterogenous plaque noted within the proximal right internal carotid artery, consistent with a 50-79% stenosis. mild heterogenous plaque noted within the proximal left internal carotid artery,consistent with a 16-49% stenosis. No hemodynamically significant stenosis.  -check orthostatics   -EP EVAL TO INTERROGATE ILR   -echo normal  -Check stress test for ischemic eval   -ct head with no No acute intracranial abnormality. Sinus disease.  -med eval     #Coronary Artery Disease, S/P PCI to mid LAD  -continue ASA 81mg daily  -continue statin and toprol daily  -plan for nuclear stress testing    # Hypertension  -bp controlled  -continue norvasc for now  - will consider holding if needed to allow for some permissive htn   -frequent PVC--- cont toprol xl at 25 daily   -will allow for some permissive htn as he routinely felt presyncopal with SBP in 120-130`s range    #Carotid Disease  -carotid noted   -continue asa + statin daily    35 minutes spent on total encounter; more than 50% of the visit was spent counseling and/or coordinating care by the attending physician.

## 2024-05-01 NOTE — DISCHARGE NOTE PROVIDER - CARE PROVIDER_API CALL
Darryl Colon  Cardiovascular Disease  [address], [address]  [place] [address]  Phone: [phone]  Fax: [phone]  Follow Up Time:

## 2024-05-01 NOTE — DISCHARGE NOTE NURSING/CASE MANAGEMENT/SOCIAL WORK - PATIENT PORTAL LINK FT
You can access the FollowMyHealth Patient Portal offered by [facility] by registering at the following website: [email or web address]. By joining [employer]’s FollowMyHealth portal, you will also be able to view your health information using other applications (apps) compatible with our system.

## 2024-05-01 NOTE — DISCHARGE NOTE PROVIDER - HOSPITAL COURSE
74 M with hx Hypertension, Hyperlipidemia, BPH, Coronary Artery Disease, S/P PCI to mid LAD 11/2017, Vertigo, Carotid Disease, syncope sp ILR presenting with syncope     #Recurrent Syncope  -unclear etiology   -ecg with no ischemic changes. HS trop negtive  -carotid duplex moderate heterogenous plaque noted within the proximal right internal carotid artery, consistent with a 50-79% stenosis. mild heterogenous plaque noted within the proximal left internal carotid artery,consistent with a 16-49% stenosis. No hemodynamically significant stenosis --> cont asa and statin   -orthostatics negative   -EP interrogation of ILR________   -echo normal  -stress test showing fixed defects  -ct head with no No acute intracranial abnormality. Sinus disease  -frequent PVCs - started on Toprol         74 M with hx Hypertension, Hyperlipidemia, BPH, Coronary Artery Disease, S/P PCI to mid LAD 11/2017, Vertigo, Carotid Disease, syncope sp ILR presenting with syncope     #Recurrent Syncope  -unclear etiology   -ecg with no ischemic changes. HS trop negtive  -carotid duplex moderate heterogenous plaque noted within the proximal right internal carotid artery, consistent with a 50-79% stenosis. mild heterogenous plaque noted within the proximal left internal carotid artery,consistent with a 16-49% stenosis. No hemodynamically significant stenosis --> cont asa and statin   -orthostatics negative   - Per pt had ILR that was removed   -echo normal  -stress test showing fixed defects  -ct head with no No acute intracranial abnormality. Sinus disease  -frequent PVCs - started on Toprol    Discussed case with Dr. Colon, pt is cleared for discharge home today.

## 2024-05-01 NOTE — DISCHARGE NOTE PROVIDER - NSDCCPCAREPLAN_GEN_ALL_CORE_FT
PRINCIPAL DISCHARGE DIAGNOSIS  Diagnosis: Syncope  Assessment and Plan of Treatment: Unclear cause. CT head without evidence of stroke. Echocardiogram normal, Stress test without reversable defects. PVCs - premature ventricular contractions were picked up on heart monitor - medication called Metoprolol was started to help supress these. Follow up with your primary care physician and cardiologist for further monitoring in 1-2 weeks. Please call to arrange appointment.      SECONDARY DISCHARGE DIAGNOSES  Diagnosis: HTN (hypertension)  Assessment and Plan of Treatment: Low sodium and fat diet, continue anti-hypertensive medications, and follow up with primary care physician.    Diagnosis: CAD (coronary artery disease)  Assessment and Plan of Treatment: Continue aspirin and statin.     PRINCIPAL DISCHARGE DIAGNOSIS  Diagnosis: Syncope  Assessment and Plan of Treatment: Unclear cause. CT head without evidence of stroke. Echocardiogram normal,  and stress test without sign of reversable defects. PVCs - premature ventricular contractions were picked up on heart monitor - medication Metoprolol was started to help supress these. Hold alfuzosin for now. Follow up with your primary care physician and cardiologist for further monitoring in 1-2 weeks. Please call to arrange appointment.      SECONDARY DISCHARGE DIAGNOSES  Diagnosis: HTN (hypertension)  Assessment and Plan of Treatment: Low sodium and fat diet, continue anti-hypertensive medications, and follow up with primary care physician.    Diagnosis: CAD (coronary artery disease)  Assessment and Plan of Treatment: Continue aspirin and statin.

## 2024-07-13 ENCOUNTER — OUTPATIENT (OUTPATIENT)
Dept: OUTPATIENT SERVICES | Facility: HOSPITAL | Age: 74
LOS: 1 days | End: 2024-07-13
Payer: MEDICARE

## 2024-07-13 ENCOUNTER — APPOINTMENT (OUTPATIENT)
Dept: ULTRASOUND IMAGING | Facility: IMAGING CENTER | Age: 74
End: 2024-07-13

## 2024-07-13 DIAGNOSIS — Z98.890 OTHER SPECIFIED POSTPROCEDURAL STATES: Chronic | ICD-10-CM

## 2024-07-13 DIAGNOSIS — R80.9 PROTEINURIA, UNSPECIFIED: ICD-10-CM

## 2024-07-13 PROCEDURE — 76770 US EXAM ABDO BACK WALL COMP: CPT | Mod: 26

## 2024-07-13 PROCEDURE — 76770 US EXAM ABDO BACK WALL COMP: CPT

## 2024-08-08 ENCOUNTER — APPOINTMENT (OUTPATIENT)
Dept: UROLOGY | Facility: CLINIC | Age: 74
End: 2024-08-08

## 2024-08-08 PROCEDURE — 99213 OFFICE O/P EST LOW 20 MIN: CPT

## 2024-08-08 PROCEDURE — G2211 COMPLEX E/M VISIT ADD ON: CPT

## 2024-08-08 NOTE — HISTORY OF PRESENT ILLNESS
[FreeTextEntry1] : Here for follow up visit.   BPH: At last visit, was on alfuzosin 10 mg every other day but discontinued secondary to dizziness and numerous syncope episodes.  Previously trial oxybutynin ER 5 mg and myrbetriq for urinary frequency but discontinued the medication in secondary to lack of efficacy.  Urinary function: Reports week - moderate stream, urgency, frequency, bothersome nocturia x 5-6 times, and feeling of incomplete bladder emptying. Denies dysuria, hematuria or hesitancy. Denies constipation.  PSA managed with PCP.   PVR - 0ml

## 2024-08-08 NOTE — ASSESSMENT
[FreeTextEntry1] : PVR reviewed Refractory urinary symptoms, failed multiple medications. Referred to Dr Miranda or Solomon for urinary frequency evaluation. Advised to decrease fluid 1-2 hours prior to bedtimes.

## 2024-09-02 NOTE — H&P ADULT - CARDIOVASCULAR SYMPTOMS
[de-identified] : 73F, RHD, PMHX of Anxiety, GERD, HLD presents with right shoulder pain since middle of March 2023. Patient reports was working out with her sister in law when she started to feel the pain. Admits to trying to rest to let it go away but hasn't. Denies outside imaging/treatment. Denies numbness/tingling.   06/16/23 Patient is here to follow up on the RT shoulder. MRI done at  imaging 06/08/23. Excedrin prn pain. PT 2x a week and trigger point injections seem to be helping.  08/07/24: f/u right shoulder. Patient reports that she had knee surgery on 04/30/24 and states that while she was attending PT for her knee, she believes she may have strained her right shoulder. Reports pain is aggravated with reaching behind herself. Patient also notes that her right hand feels stiff in the morning. Currently taking Meloxicam and tylenol PRN with minimal relief.  Denies numbness/tingling.   08/28/24: f/u right shoulder. Patient reports that she is having pain in the anterior aspect of her right shoulder, worse with reaching backwards and movement such as reaching above her head. Patient reports that she had just begun PT for her shoulder the week of 08/19/24, stating that she has attended 5 visits so far with relief. Denies numbness/tingling.  dyspnea on exertion/CP see HPI. LIN after walking/jogging one block/chest pain

## 2024-09-13 ENCOUNTER — APPOINTMENT (OUTPATIENT)
Dept: UROLOGY | Facility: CLINIC | Age: 74
End: 2024-09-13
Payer: MEDICARE

## 2024-09-13 VITALS
TEMPERATURE: 98.2 F | HEART RATE: 69 BPM | SYSTOLIC BLOOD PRESSURE: 118 MMHG | DIASTOLIC BLOOD PRESSURE: 57 MMHG | RESPIRATION RATE: 18 BRPM

## 2024-09-13 DIAGNOSIS — N40.1 BENIGN PROSTATIC HYPERPLASIA WITH LOWER URINARY TRACT SYMPMS: ICD-10-CM

## 2024-09-13 DIAGNOSIS — R35.0 FREQUENCY OF MICTURITION: ICD-10-CM

## 2024-09-13 DIAGNOSIS — R55 SYNCOPE AND COLLAPSE: ICD-10-CM

## 2024-09-13 DIAGNOSIS — R35.1 BENIGN PROSTATIC HYPERPLASIA WITH LOWER URINARY TRACT SYMPMS: ICD-10-CM

## 2024-09-13 DIAGNOSIS — N39.43 POST-VOID DRIBBLING: ICD-10-CM

## 2024-09-13 DIAGNOSIS — R35.1 NOCTURIA: ICD-10-CM

## 2024-09-13 PROCEDURE — 51798 US URINE CAPACITY MEASURE: CPT

## 2024-09-13 PROCEDURE — G2211 COMPLEX E/M VISIT ADD ON: CPT

## 2024-09-13 PROCEDURE — 99215 OFFICE O/P EST HI 40 MIN: CPT

## 2024-09-13 NOTE — END OF VISIT
[FreeTextEntry3] : Trial of silodosin 4 mg for BPH counseled re light headedness dizziness falls  renal bladder u/s to assess prostatic volume  rto cystoscopy  The total time spent with the patient includes face to face time as well as time for documentation, ordering medications/labs/procedures, and care coordination, but I acknowledge it does not include time spent on any procedures performed (eg PVR, UDS, Cystoscopy, catheter changes, etc).  Time includes reviewing the chart prior to visit, documentation, and correspondence. [Time Spent: ___ minutes] : I have spent [unfilled] minutes of time on the encounter which excludes teaching and separately reported services.

## 2024-09-13 NOTE — ASSESSMENT
[FreeTextEntry1] : Plan: - PVR today = 20cc - Recommend bladder US to measure prostate volume - Given history of dizziness and orthostatic hypotension from tamsulosin and alfuzosin, will rx with silodosin 4mg PO qhs. Discussed risks and benefits given that silodosin has lower risk of orthostatic hypotension - Discussed with patient that he should call the office if he has any issues with taking the silodosin - Discussed possible surgical options if the prostate is enlarged on imaging and if his symtoms do not improve - Patient elected to have cystoscopy done as well at a different visit - Follow up in 1 month

## 2024-09-13 NOTE — HISTORY OF PRESENT ILLNESS
[FreeTextEntry1] : Patient 74 year old presenting as referral for urinary frequency and nocturia from Dr. Pierre.  BPH - has tried alfuzosin/tamsulosin in past OAB - oxybutynin, and myrbetriq in the past.   Oxybutynin caused him side effects such as upset stomach so he stopped taking that.  Alfuzosin lead to dizziness which was stopped and myrbetriq did not help improve symptoms.  Has tried flomax before as well which caused significant dizziness.   Currently not on any medications for his urinary symptoms. Denies gross hematuria or dysuria.  DTV [id number] Nocturia q1-2 hours Complains of weak stream and dribbling that happens intermittently. Also has sensation of incomplete emptying a few times a week  Bowel movements: Once a day Water intake: 6-8 glasses of water a day. Does not drink fluids about 1-2 hours prior to sleeping. Drinks coffee one cup in the mornings.  RBUS in July 2024 showed no hydronephrosis and low PVR. Prostate volume was not measured at the time but patient states that he was told he has an enlarged prostate in the past.   PVR today is 20cc

## 2024-09-13 NOTE — PHYSICAL EXAM
[Normal Appearance] : normal appearance [Edema] : no peripheral edema [] : no respiratory distress [Abdomen Soft] : soft [Costovertebral Angle Tenderness] : no ~M costovertebral angle tenderness [Urinary Bladder Findings] : the bladder was normal on palpation [Skin Color & Pigmentation] : normal skin color and pigmentation [Affect] : the affect was normal [de-identified] : PVR 20cc

## 2024-09-20 RX ORDER — SILODOSIN 4 MG/1
4 CAPSULE ORAL DAILY
Qty: 30 | Refills: 3 | Status: COMPLETED | COMMUNITY
Start: 2024-09-13 | End: 2024-09-20

## 2024-09-20 RX ORDER — DOXAZOSIN 4 MG/1
4 TABLET ORAL DAILY
Qty: 90 | Refills: 0 | Status: ACTIVE | COMMUNITY
Start: 2024-09-20 | End: 1900-01-01

## 2024-10-03 ENCOUNTER — APPOINTMENT (OUTPATIENT)
Dept: ULTRASOUND IMAGING | Facility: IMAGING CENTER | Age: 74
End: 2024-10-03

## 2024-11-07 ENCOUNTER — APPOINTMENT (OUTPATIENT)
Dept: UROLOGY | Facility: CLINIC | Age: 74
End: 2024-11-07
Payer: MEDICARE

## 2024-11-07 ENCOUNTER — OUTPATIENT (OUTPATIENT)
Dept: OUTPATIENT SERVICES | Facility: HOSPITAL | Age: 74
LOS: 1 days | End: 2024-11-07
Payer: MEDICARE

## 2024-11-07 VITALS
TEMPERATURE: 98.4 F | HEART RATE: 80 BPM | SYSTOLIC BLOOD PRESSURE: 150 MMHG | DIASTOLIC BLOOD PRESSURE: 66 MMHG | OXYGEN SATURATION: 98 % | RESPIRATION RATE: 16 BRPM

## 2024-11-07 DIAGNOSIS — R35.0 FREQUENCY OF MICTURITION: ICD-10-CM

## 2024-11-07 DIAGNOSIS — Z95.5 PRESENCE OF CORONARY ANGIOPLASTY IMPLANT AND GRAFT: Chronic | ICD-10-CM

## 2024-11-07 DIAGNOSIS — Z98.890 OTHER SPECIFIED POSTPROCEDURAL STATES: Chronic | ICD-10-CM

## 2024-11-07 PROCEDURE — 52000 CYSTOURETHROSCOPY: CPT

## 2024-11-08 DIAGNOSIS — N40.1 BENIGN PROSTATIC HYPERPLASIA WITH LOWER URINARY TRACT SYMPTOMS: ICD-10-CM

## 2024-11-11 ENCOUNTER — APPOINTMENT (OUTPATIENT)
Dept: UROLOGY | Facility: CLINIC | Age: 74
End: 2024-11-11

## 2024-11-11 VITALS
DIASTOLIC BLOOD PRESSURE: 57 MMHG | SYSTOLIC BLOOD PRESSURE: 118 MMHG | HEART RATE: 85 BPM | TEMPERATURE: 98.2 F | RESPIRATION RATE: 18 BRPM

## 2024-11-11 DIAGNOSIS — N40.1 BENIGN PROSTATIC HYPERPLASIA WITH LOWER URINARY TRACT SYMPMS: ICD-10-CM

## 2024-11-11 DIAGNOSIS — R35.0 FREQUENCY OF MICTURITION: ICD-10-CM

## 2024-11-11 DIAGNOSIS — R35.1 BENIGN PROSTATIC HYPERPLASIA WITH LOWER URINARY TRACT SYMPMS: ICD-10-CM

## 2024-11-11 PROCEDURE — G2211 COMPLEX E/M VISIT ADD ON: CPT

## 2024-11-11 PROCEDURE — 99212 OFFICE O/P EST SF 10 MIN: CPT

## 2025-01-03 NOTE — ED PROVIDER NOTE - OTHER FINDINGS
He has an underlying normal sinus rhythm with frequent premature atrial contractions leading to an overall irregular rate.  His intervals are unremarkable.  His P wave morphology is unremarkable.  He has poor R wave progression, which is nonspecific.  He has no willy ischemic changes. ,

## 2025-03-04 NOTE — ASU DISCHARGE PLAN (ADULT/PEDIATRIC) - ASU DC SPECIAL INSTRUCTIONSFT
- Start Aspirin today  - Hold Plavix for 2 days, resume on Saturday 9/21/19  - ice for swelling  - Call to schedule post op appointment with Dr. Rios for 7-10 days
Other specify

## 2025-03-24 ENCOUNTER — NON-APPOINTMENT (OUTPATIENT)
Age: 75
End: 2025-03-24

## 2025-03-24 ENCOUNTER — APPOINTMENT (OUTPATIENT)
Dept: UROLOGY | Facility: CLINIC | Age: 75
End: 2025-03-24
Payer: MEDICARE

## 2025-03-24 VITALS
BODY MASS INDEX: 21.19 KG/M2 | DIASTOLIC BLOOD PRESSURE: 85 MMHG | WEIGHT: 135 LBS | HEIGHT: 67 IN | SYSTOLIC BLOOD PRESSURE: 150 MMHG | RESPIRATION RATE: 16 BRPM | HEART RATE: 97 BPM

## 2025-03-24 DIAGNOSIS — N40.1 BENIGN PROSTATIC HYPERPLASIA WITH LOWER URINARY TRACT SYMPMS: ICD-10-CM

## 2025-03-24 DIAGNOSIS — R35.1 BENIGN PROSTATIC HYPERPLASIA WITH LOWER URINARY TRACT SYMPMS: ICD-10-CM

## 2025-03-24 DIAGNOSIS — R35.1 NOCTURIA: ICD-10-CM

## 2025-03-24 DIAGNOSIS — R35.0 FREQUENCY OF MICTURITION: ICD-10-CM

## 2025-03-24 PROCEDURE — G2211 COMPLEX E/M VISIT ADD ON: CPT

## 2025-03-24 PROCEDURE — 51798 US URINE CAPACITY MEASURE: CPT

## 2025-03-24 PROCEDURE — 99214 OFFICE O/P EST MOD 30 MIN: CPT

## 2025-03-25 LAB
APPEARANCE: CLEAR
BACTERIA: NEGATIVE /HPF
BILIRUBIN URINE: NEGATIVE
BLOOD URINE: NEGATIVE
CAST: 3 /LPF
COLOR: YELLOW
EPITHELIAL CELLS: 4 /HPF
GLUCOSE QUALITATIVE U: NEGATIVE MG/DL
KETONES URINE: ABNORMAL MG/DL
LEUKOCYTE ESTERASE URINE: NEGATIVE
MICROSCOPIC-UA: NORMAL
NITRITE URINE: NEGATIVE
PH URINE: 5.5
PROTEIN URINE: 30 MG/DL
RED BLOOD CELLS URINE: 0 /HPF
SPECIFIC GRAVITY URINE: 1.03
UROBILINOGEN URINE: 0.2 MG/DL
WHITE BLOOD CELLS URINE: 1 /HPF

## 2025-03-26 LAB — BACTERIA UR CULT: NORMAL

## 2025-03-28 ENCOUNTER — APPOINTMENT (OUTPATIENT)
Dept: UROLOGY | Facility: CLINIC | Age: 75
End: 2025-03-28

## 2025-04-07 ENCOUNTER — APPOINTMENT (OUTPATIENT)
Dept: UROLOGY | Facility: CLINIC | Age: 75
End: 2025-04-07

## 2025-07-16 NOTE — PATIENT PROFILE ADULT - NSPROMEDSBROUGHTTOHOSP_GEN_A_NUR
Subjective:     CC: Anxiety, sleep    HPI:   Kaila is a 66-year-old female with a complex medical history that includes severe asthma, psoriatic arthritis, history of PCP pneumonia, chronic immunosuppression, and ILD who presents with concerns for anxiety and sleep.  Anxiety has been a chronic issue and she is currently on Lexapro 30 mg daily.  This is recently flared secondary to stressors including her own health and her 's health.  Would like to try something on an as-needed basis.  Also having trouble sleeping due to anxiety.  Did take some Benadryl recently which did help with falling asleep.    Current Medications[1]    Medications, past medical history, allergies, and social history have been reviewed and updated.      Objective:       Exam:  /64 (BP Location: Left arm, Patient Position: Sitting, BP Cuff Size: Large adult)   Pulse 82   Temp 36.1 °C (96.9 °F) (Temporal)   Resp 16   Ht 1.829 m (6')   Wt 116 kg (256 lb 6.4 oz)   LMP  (LMP Unknown)   SpO2 96%   BMI 34.77 kg/m²  Body mass index is 34.77 kg/m².    Constitutional: Alert. Well appearing. No distress.  Skin: Warm, dry, good turgor, no visible rashes.  Respiratory: Normal effort.   Neuro: Moves all four extremities. No facial droop.  Psych: Answers questions appropriately. Normal affect and mood.    Assessment & Plan:     66 y.o. female with the following -     1. CHRISTY (generalized anxiety disorder) (Primary)  Worsened recently with acute stressors.  Continue current Lexapro, she would like to try something additional on an as needed basis and will try hydroxyzine.  - hydrOXYzine HCl (ATARAX) 25 MG Tab; Take 1 Tablet by mouth 3 times a day as needed for Itching.  Dispense: 30 Tablet; Refill: 3    2. Psychophysiological insomnia  Secondary to recent acute stressors.  Try trazodone, sleep hygiene measures.  - traZODone (DESYREL) 50 MG Tab; Take 1 Tablet by mouth every evening.  Dispense: 90 Tablet; Refill: 1      Please note that this  note was created using voice recognition software.           [1]   Current Outpatient Medications   Medication Sig Dispense Refill    predniSONE (DELTASONE) 20 MG Tab Take 20 mg by mouth 2 times a day.      Albuterol-Budesonide (AIRSUPRA) 90-80 MCG/ACT Aerosol Inhale 1-2 Puffs every 4 hours. 10.7 g 3    predniSONE (DELTASONE) 10 MG Tab Take 40mg x 1 days, then take 30mg x 4 days, then take 20mg x 4 days, with food, then take 10 mg daily x4 days with food for 4 days then stop 30 Tablet 0    sulfamethoxazole-trimethoprim (BACTRIM DS) 800-160 MG tablet Take 1 Tablet by mouth every Monday, Wednesday, and Friday. Take until gone. 39 Tablet 0    triamcinolone acetonide (KENALOG) 0.1 % Cream Apply 1 Application topically 2 times a day. 45 g 6    ipratropium-albuterol (COMBIVENT RESPIMAT)  MCG/ACT Aero Soln Inhale 1 Puff 4 times a day. 12 g 3    etodolac (LODINE) 400 MG tablet Take 1 Tablet by mouth 2 times a day. 60 Tablet 0    tezepelumab-ekko (TEZSPIRE) 210 MG/1.91ML injection Inject 1.91 mL under the skin every 4 weeks. 1.91 mL 12    lamoTRIgine (LAMICTAL) 100 MG Tab Take 1 Tablet by mouth every morning. 90 Tablet 3    pantoprazole (PROTONIX) 40 MG Tablet Delayed Response Take 1 Tablet by mouth 2 times a day. Ok to decrease to once daily once GERD symptoms are controlled 180 Tablet 1    Mirabegron ER 50 MG TABLET SR 24 HR Take 1 Tablet by mouth every day.      EPINEPHrine (EPIPEN) 0.3 MG/0.3ML Solution Auto-injector solution for injection INJECT 1 PEN IN THE MUSCLE ONE TIME AS DIRECTED FOR ANAPHYLAXIS      pregabalin (LYRICA) 200 MG capsule Take 1 Capsule by mouth 2 times a day.      pregabalin (LYRICA) 100 MG Cap Take 100 mg by mouth.      escitalopram (LEXAPRO) 20 MG tablet TAKE 1 TABLET EVERY MORNING. TAKE WITH ESCITALOPRAM 10 MG FOR A TOTAL DOSE OF 30 MG 90 Tablet 3    escitalopram (LEXAPRO) 10 MG Tab TAKE 1 TABLET EVERY MORNING. TAKE WITH ESCITALOPRAM 20 MG FOR A TOTAL DOSE OF 30 MG 90 Tablet 3    famotidine  (PEPCID) 20 MG Tab TAKE 1 TABLET TWICE A DAY FOR GASTROESOPHAGEAL REFLUX DISEASE 180 Tablet 3    DULERA 200-5 MCG/ACT Aerosol USE 2 INHALATIONS TWICE A DAY 39 g 3    montelukast (SINGULAIR) 10 MG Tab TAKE 1 TABLET DAILY 90 Tablet 3    rosuvastatin (CRESTOR) 10 MG Tab TAKE 1 TABLET AT BEDTIME 90 Tablet 3    ipratropium-albuterol (DUONEB) 0.5-2.5 (3) MG/3ML nebulizer solution Take 3 mL by nebulization every 4 hours. 270 mL 5    POTASSIUM PO Take 99 mg by mouth every day.   CONTINUE TAKING MED PRIOR TO SURGERY      loratadine (CLARITIN) 10 MG Tab Take 10 mg by mouth every day.   MEDICATION INSTRUCTIONS FOR SURGERY 12/31/2024  CONTINUE TAKING MED PRIOR TO SURGERY      magnesium oxide (MAG-OX) 400 MG Tab tablet Take 1 Tablet by mouth every morning. 90 Tablet 3     No current facility-administered medications for this visit.      no

## 2025-08-04 ENCOUNTER — APPOINTMENT (OUTPATIENT)
Dept: RADIOLOGY | Facility: IMAGING CENTER | Age: 75
End: 2025-08-04
Payer: MEDICARE

## 2025-08-04 ENCOUNTER — OUTPATIENT (OUTPATIENT)
Dept: OUTPATIENT SERVICES | Facility: HOSPITAL | Age: 75
LOS: 1 days | End: 2025-08-04
Payer: MEDICARE

## 2025-08-04 DIAGNOSIS — Z00.8 ENCOUNTER FOR OTHER GENERAL EXAMINATION: ICD-10-CM

## 2025-08-04 DIAGNOSIS — Z98.890 OTHER SPECIFIED POSTPROCEDURAL STATES: Chronic | ICD-10-CM

## 2025-08-04 DIAGNOSIS — M45.5 ANKYLOSING SPONDYLITIS OF THORACOLUMBAR REGION: ICD-10-CM

## 2025-08-04 DIAGNOSIS — Z95.5 PRESENCE OF CORONARY ANGIOPLASTY IMPLANT AND GRAFT: Chronic | ICD-10-CM

## 2025-08-04 PROCEDURE — 72100 X-RAY EXAM L-S SPINE 2/3 VWS: CPT | Mod: 26

## 2025-08-04 PROCEDURE — 72100 X-RAY EXAM L-S SPINE 2/3 VWS: CPT

## 2025-08-28 ENCOUNTER — APPOINTMENT (OUTPATIENT)
Dept: UROLOGY | Facility: CLINIC | Age: 75
End: 2025-08-28

## 2025-08-28 ENCOUNTER — NON-APPOINTMENT (OUTPATIENT)
Age: 75
End: 2025-08-28

## 2025-08-28 VITALS
TEMPERATURE: 98.2 F | SYSTOLIC BLOOD PRESSURE: 167 MMHG | HEIGHT: 67 IN | HEART RATE: 71 BPM | WEIGHT: 135 LBS | BODY MASS INDEX: 21.19 KG/M2 | RESPIRATION RATE: 16 BRPM | OXYGEN SATURATION: 98 % | DIASTOLIC BLOOD PRESSURE: 77 MMHG

## 2025-08-28 PROCEDURE — 99212 OFFICE O/P EST SF 10 MIN: CPT

## 2025-08-28 PROCEDURE — G2211 COMPLEX E/M VISIT ADD ON: CPT
